# Patient Record
Sex: FEMALE | Race: WHITE | NOT HISPANIC OR LATINO | Employment: OTHER | URBAN - METROPOLITAN AREA
[De-identification: names, ages, dates, MRNs, and addresses within clinical notes are randomized per-mention and may not be internally consistent; named-entity substitution may affect disease eponyms.]

---

## 2017-03-30 ENCOUNTER — ALLSCRIPTS OFFICE VISIT (OUTPATIENT)
Dept: OTHER | Facility: OTHER | Age: 77
End: 2017-03-30

## 2017-03-30 DIAGNOSIS — I10 ESSENTIAL (PRIMARY) HYPERTENSION: ICD-10-CM

## 2017-03-30 DIAGNOSIS — I25.10 ATHEROSCLEROTIC HEART DISEASE OF NATIVE CORONARY ARTERY WITHOUT ANGINA PECTORIS: ICD-10-CM

## 2017-04-04 ENCOUNTER — GENERIC CONVERSION - ENCOUNTER (OUTPATIENT)
Dept: OTHER | Facility: OTHER | Age: 77
End: 2017-04-04

## 2017-04-04 ENCOUNTER — APPOINTMENT (OUTPATIENT)
Dept: LAB | Facility: CLINIC | Age: 77
End: 2017-04-04
Payer: MEDICARE

## 2017-04-04 DIAGNOSIS — I25.10 ATHEROSCLEROTIC HEART DISEASE OF NATIVE CORONARY ARTERY WITHOUT ANGINA PECTORIS: ICD-10-CM

## 2017-04-04 DIAGNOSIS — I10 ESSENTIAL (PRIMARY) HYPERTENSION: ICD-10-CM

## 2017-04-04 LAB
ALBUMIN SERPL BCP-MCNC: 3.8 G/DL (ref 3.5–5)
ALP SERPL-CCNC: 90 U/L (ref 46–116)
ALT SERPL W P-5'-P-CCNC: 28 U/L (ref 12–78)
ANION GAP SERPL CALCULATED.3IONS-SCNC: 8 MMOL/L (ref 4–13)
AST SERPL W P-5'-P-CCNC: 11 U/L (ref 5–45)
BILIRUB SERPL-MCNC: 0.65 MG/DL (ref 0.2–1)
BUN SERPL-MCNC: 19 MG/DL (ref 5–25)
CALCIUM SERPL-MCNC: 9.3 MG/DL (ref 8.3–10.1)
CHLORIDE SERPL-SCNC: 103 MMOL/L (ref 100–108)
CHOLEST SERPL-MCNC: 119 MG/DL (ref 50–200)
CO2 SERPL-SCNC: 28 MMOL/L (ref 21–32)
CREAT SERPL-MCNC: 0.79 MG/DL (ref 0.6–1.3)
GFR SERPL CREATININE-BSD FRML MDRD: >60 ML/MIN/1.73SQ M
GLUCOSE P FAST SERPL-MCNC: 96 MG/DL (ref 65–99)
HDLC SERPL-MCNC: 45 MG/DL (ref 40–60)
LDLC SERPL CALC-MCNC: 59 MG/DL (ref 0–100)
POTASSIUM SERPL-SCNC: 4.6 MMOL/L (ref 3.5–5.3)
PROT SERPL-MCNC: 7.3 G/DL (ref 6.4–8.2)
SODIUM SERPL-SCNC: 139 MMOL/L (ref 136–145)
TRIGL SERPL-MCNC: 77 MG/DL
TSH SERPL DL<=0.05 MIU/L-ACNC: 4.49 UIU/ML (ref 0.36–3.74)

## 2017-04-04 PROCEDURE — 80053 COMPREHEN METABOLIC PANEL: CPT

## 2017-04-04 PROCEDURE — 36415 COLL VENOUS BLD VENIPUNCTURE: CPT

## 2017-04-04 PROCEDURE — 84443 ASSAY THYROID STIM HORMONE: CPT

## 2017-04-04 PROCEDURE — 80061 LIPID PANEL: CPT

## 2017-04-20 ENCOUNTER — ALLSCRIPTS OFFICE VISIT (OUTPATIENT)
Dept: OTHER | Facility: OTHER | Age: 77
End: 2017-04-20

## 2017-06-13 ENCOUNTER — TRANSCRIBE ORDERS (OUTPATIENT)
Dept: LAB | Facility: CLINIC | Age: 77
End: 2017-06-13

## 2017-06-13 ENCOUNTER — APPOINTMENT (OUTPATIENT)
Dept: LAB | Facility: CLINIC | Age: 77
End: 2017-06-13
Payer: MEDICARE

## 2017-06-13 DIAGNOSIS — E03.9 HYPOTHYROIDISM, UNSPECIFIED TYPE: Primary | ICD-10-CM

## 2017-06-13 DIAGNOSIS — E03.9 HYPOTHYROIDISM, UNSPECIFIED TYPE: ICD-10-CM

## 2017-06-13 LAB — TSH SERPL DL<=0.05 MIU/L-ACNC: 1.59 UIU/ML (ref 0.36–3.74)

## 2017-06-13 PROCEDURE — 84443 ASSAY THYROID STIM HORMONE: CPT

## 2017-06-13 PROCEDURE — 36415 COLL VENOUS BLD VENIPUNCTURE: CPT

## 2017-10-02 ENCOUNTER — ALLSCRIPTS OFFICE VISIT (OUTPATIENT)
Dept: OTHER | Facility: OTHER | Age: 77
End: 2017-10-02

## 2017-10-02 DIAGNOSIS — E03.9 HYPOTHYROIDISM: ICD-10-CM

## 2017-10-03 ENCOUNTER — GENERIC CONVERSION - ENCOUNTER (OUTPATIENT)
Dept: OTHER | Facility: OTHER | Age: 77
End: 2017-10-03

## 2017-10-03 ENCOUNTER — APPOINTMENT (OUTPATIENT)
Dept: LAB | Facility: CLINIC | Age: 77
End: 2017-10-03
Payer: MEDICARE

## 2017-10-03 DIAGNOSIS — E03.9 HYPOTHYROIDISM: ICD-10-CM

## 2017-10-03 LAB
ALBUMIN SERPL BCP-MCNC: 3.8 G/DL (ref 3.5–5)
ALP SERPL-CCNC: 90 U/L (ref 46–116)
ALT SERPL W P-5'-P-CCNC: 26 U/L (ref 12–78)
ANION GAP SERPL CALCULATED.3IONS-SCNC: 7 MMOL/L (ref 4–13)
AST SERPL W P-5'-P-CCNC: 11 U/L (ref 5–45)
BASOPHILS # BLD AUTO: 0.01 THOUSANDS/ΜL (ref 0–0.1)
BASOPHILS NFR BLD AUTO: 0 % (ref 0–1)
BILIRUB SERPL-MCNC: 0.6 MG/DL (ref 0.2–1)
BUN SERPL-MCNC: 15 MG/DL (ref 5–25)
CALCIUM SERPL-MCNC: 9.1 MG/DL (ref 8.3–10.1)
CHLORIDE SERPL-SCNC: 100 MMOL/L (ref 100–108)
CHOLEST SERPL-MCNC: 116 MG/DL (ref 50–200)
CO2 SERPL-SCNC: 27 MMOL/L (ref 21–32)
CREAT SERPL-MCNC: 0.77 MG/DL (ref 0.6–1.3)
EOSINOPHIL # BLD AUTO: 0.1 THOUSAND/ΜL (ref 0–0.61)
EOSINOPHIL NFR BLD AUTO: 1 % (ref 0–6)
ERYTHROCYTE [DISTWIDTH] IN BLOOD BY AUTOMATED COUNT: 13.2 % (ref 11.6–15.1)
GFR SERPL CREATININE-BSD FRML MDRD: 75 ML/MIN/1.73SQ M
GLUCOSE P FAST SERPL-MCNC: 91 MG/DL (ref 65–99)
HCT VFR BLD AUTO: 43.8 % (ref 34.8–46.1)
HDLC SERPL-MCNC: 42 MG/DL (ref 40–60)
HGB BLD-MCNC: 14.6 G/DL (ref 11.5–15.4)
LDLC SERPL CALC-MCNC: 57 MG/DL (ref 0–100)
LYMPHOCYTES # BLD AUTO: 2.52 THOUSANDS/ΜL (ref 0.6–4.47)
LYMPHOCYTES NFR BLD AUTO: 30 % (ref 14–44)
MCH RBC QN AUTO: 29.7 PG (ref 26.8–34.3)
MCHC RBC AUTO-ENTMCNC: 33.3 G/DL (ref 31.4–37.4)
MCV RBC AUTO: 89 FL (ref 82–98)
MONOCYTES # BLD AUTO: 0.92 THOUSAND/ΜL (ref 0.17–1.22)
MONOCYTES NFR BLD AUTO: 11 % (ref 4–12)
NEUTROPHILS # BLD AUTO: 4.85 THOUSANDS/ΜL (ref 1.85–7.62)
NEUTS SEG NFR BLD AUTO: 58 % (ref 43–75)
NRBC BLD AUTO-RTO: 0 /100 WBCS
PLATELET # BLD AUTO: 248 THOUSANDS/UL (ref 149–390)
PMV BLD AUTO: 10.8 FL (ref 8.9–12.7)
POTASSIUM SERPL-SCNC: 4.1 MMOL/L (ref 3.5–5.3)
PROT SERPL-MCNC: 7.7 G/DL (ref 6.4–8.2)
RBC # BLD AUTO: 4.92 MILLION/UL (ref 3.81–5.12)
SODIUM SERPL-SCNC: 134 MMOL/L (ref 136–145)
TRIGL SERPL-MCNC: 83 MG/DL
TSH SERPL DL<=0.05 MIU/L-ACNC: 2.41 UIU/ML (ref 0.36–3.74)
WBC # BLD AUTO: 8.42 THOUSAND/UL (ref 4.31–10.16)

## 2017-10-03 PROCEDURE — 85025 COMPLETE CBC W/AUTO DIFF WBC: CPT

## 2017-10-03 PROCEDURE — 80053 COMPREHEN METABOLIC PANEL: CPT

## 2017-10-03 PROCEDURE — 80061 LIPID PANEL: CPT

## 2017-10-03 PROCEDURE — 36415 COLL VENOUS BLD VENIPUNCTURE: CPT

## 2017-10-03 PROCEDURE — 84443 ASSAY THYROID STIM HORMONE: CPT

## 2017-10-03 NOTE — PROGRESS NOTES
Assessment  1  Arteriosclerosis of coronary artery (414 00) (I25 10)   2  Hyperlipidemia (272 4) (E78 5)   3  Hypertension (401 9) (I10)   4  Hypothyroidism (244 9) (E03 9)    Plan  Hypothyroidism    · (1) CBC/PLT/DIFF; Status:Active; Requested FDO:[id number];    · (1) COMPREHENSIVE METABOLIC PANEL; Status:Active; Requested RYX:[id number];    · (1) LIPID PANEL, FASTING; Status:Active; Requested for:02Oct2017;    · (1) TSH; Status:Active; Requested UJD:[id number];     Discussion/Summary    Hypertension  Patient's blood pressure is stable at this time and she will continue her current regimen of medications  She will obtain blood work as ordered  Patient will check lipid panel blood work and continue her current statin therapy  Patient will check TSH blood work and continue her current dose of levothyroxine  artery disease  Patient denies any chest pain or shortness of breath  She scheduled follow-up with her cardiologist today  maintenance  Patient refuses any flu vaccines or pneumonia vaccines  Chief Complaint  Pt is here for a 6 month f/u appt  Pt offers no complaints  Pt would like to review how to take her medications  History of Present Illness  Patient denies any recent illness  She denies any chest pain or shortness of breath  She scheduled to see her cardiologist later today for routine follow-up visit  Review of Systems    Constitutional: No fever, no chills, feels well, no tiredness, no recent weight gain or weight loss  ENT: no complaints of earache, no loss of hearing, no nose bleeds, no nasal discharge, no sore throat, no hoarseness  Cardiovascular: No complaints of slow heart rate, no fast heart rate, no chest pain, no palpitations, no leg claudication, no lower extremity edema  Respiratory: No complaints of shortness of breath, no wheezing, no cough, no SOB on exertion, no orthopnea, no PND     Gastrointestinal: No complaints of abdominal pain, no constipation, no nausea or vomiting, no diarrhea, no bloody stools  Genitourinary: No complaints of dysuria, no incontinence, no pelvic pain, no dysmenorrhea, no vaginal discharge or bleeding  Musculoskeletal: No complaints of arthralgias, no myalgias, no joint swelling or stiffness, no limb pain or swelling  Active Problems  1  Arteriosclerosis of coronary artery (414 00) (I25 10)   2  Back pain (724 5) (M54 9)   3  Chest pain (786 50) (R07 9)   4  Eczema (692 9) (L30 9)   5  Esophageal reflux (530 81) (K21 9)   6  Generalized anxiety disorder (300 02) (F41 1)   7  Hyperlipidemia (272 4) (E78 5)   8  Hypertension (401 9) (I10)   9  Hypokalemia (276 8) (E87 6)   10  Hypothyroidism (244 9) (E03 9)   11  Otitis externa (380 10) (H60 90)   12  Pain of upper extremity, unspecified laterality (729 5) (M79 603)   13  Urinary tract infection (599 0) (N39 0)    Past Medical History  1  History of Cardiac Cath Procedures Performed   2  History of Coronary Artery Disease (V12 59)    Family History  Mother    1  Family history of Prior Myocardial Infarction  Father    2  Family history of Coronary Artery Disease (V17 49)   3  Family history of Hypertension (V17 49)  Sister    4  Family history of Cancer   5  Family history of Coronary Artery Disease (V17 49)   6  Family history of Diabetes Mellitus (V18 0)   7  Family history of Hyperlipidemia  Brother    8  Family history of Diabetes Mellitus (V18 0)    Social History   · Alcohol   · Daily Coffee Consumption (4  Cups/Day)   · Never A Smoker  The social history was reviewed and updated today  Current Meds   1  AmLODIPine Besylate 10 MG Oral Tablet; Take 1 tablet daily; Therapy: 60AXV6568 to (Last Rx:07Oct2016)  Requested for: 07Oct2016 Ordered   2  Aspirin 81 MG TABS; TAKE 1 TABLET DAILY; Therapy: (Recorded:68Yfb3295) to Recorded   3  Atorvastatin Calcium 40 MG Oral Tablet; TAKE 1 TABLET EVERY DAY AT BEDTIME;    Therapy: 59TNI7342 to (Evaluate:24Gfi5345)  Requested for: 21Feb2017; Last Rx: 36RQE1275 Ordered   4  Eye Vitamins Oral Capsule; TAKE AS DIRECTED; Therapy: 62HFC5237 to Recorded   5  Levothyroxine Sodium 25 MCG Oral Tablet; take 1 tablet by mouth once daily; Therapy: 36RGZ2799 to (Emma Sheth)  Requested for: 97OYJ1123; Last   Rx:21Wvm5204 Ordered   6  Losartan Potassium-HCTZ 100-25 MG Oral Tablet; take 1 tablet every day; Therapy: 02BLY6888 to (Evaluate:18Tpi8451)  Requested for: 67Bsc0744; Last   Rx:72Mds9247 Ordered   7  Metoprolol Tartrate 50 MG Oral Tablet; Take 1 tablet twice daily; Therapy: 38FBO8663 to (Evaluate:20Qvi1174)  Requested for: 01Nuz2734; Last   Rx:61Owk5089 Ordered   8  Nitrostat 0 4 MG Sublingual Tablet Sublingual; DISSOLVE 1 TABLET UNDER THE   TONGUE AS NEEDED FOR CHEST PAIN;   Therapy: (Recorded:85Ukc6668) to Recorded   9  Potassium Chloride Janet ER 20 MEQ Oral Tablet Extended Release; Take 1 tablet twice   daily; Therapy: 89WVG0713 to (Kary Jay)  Requested for: 81MVP4625; Last   Rx:83Bdu6283 Ordered    The medication list was reviewed and updated today  Allergies  1  No Known Drug Allergies    Vitals  Vital Signs    Recorded: 93BSE9532 07:43AM Recorded: 37QNZ2753 07:29AM   Temperature  96 8 F   Heart Rate  62   Systolic 309 301   Diastolic 80 80   Height  4 ft 11 in   Weight  128 lb    BMI Calculated  25 85   BSA Calculated  1 53     Physical Exam    Constitutional   General appearance: No acute distress, well appearing and well nourished  Pulmonary   Respiratory effort: No increased work of breathing or signs of respiratory distress  Auscultation of lungs: Clear to auscultation  Cardiovascular   Auscultation of heart: Normal rate and rhythm, normal S1 and S2, without murmurs  Examination of extremities for edema and/or varicosities: Normal     Carotid pulses: Normal     Abdomen   Abdomen: Non-tender, no masses  Liver and spleen: No hepatomegaly or splenomegaly      Musculoskeletal   Gait and station: Normal  Health Management  Health Maintenance   Medicare Annual Wellness Visit; every 1 year; Next Due: [id number]; Active    Future Appointments    Date/Time Provider Specialty Site   10/02/2017 08:20 HERB Leyva   Cardiology [facility]     Signatures   Electronically signed by : HERB Amin ; Oct  2 9023  7:53AM EST                       (Author)

## 2017-10-03 NOTE — PROGRESS NOTES
Assessment  Assessed   1  Arteriosclerosis of coronary artery (414 00) (I25 10)  2  Hyperlipidemia (272 4) (E78 5)    Plan  Arteriosclerosis of coronary artery    · Follow-up visit in 6 months Evaluation and Treatment  Follow-up  Status: Hold For -  Scheduling  Requested for: 50TPG6999  Ordered; For: Arteriosclerosis of coronary artery;  Ordered ByOtho Guardian    Performed:   Due: 90IOY3210   · A diet that is low in fat, cholesterol, and sodium is considered a cardiac diet ;  Status:Complete;   Done: 70WDF9244 08:49AM  Ordered; For:Arteriosclerosis of coronary artery; Ordered By:Dhiraj Jones;   · Begin or continue regular aerobic exercise  Gradually work up to at least {count1}  sessions of {dur1} of exercise a week ; Status:Complete;   Done: 27FDZ7338 08:49AM  Ordered; For:Arteriosclerosis of coronary artery; Ordered By:Dhiraj Jones; Discussion/Summary  Counseling Documentation With Imm: The patient was counseled regarding diagnostic results,-instructions for management,-risk factor reductions,-impressions,-importance of compliance with treatment  total time of encounter was 25 emkrsew-lzl-82 minutes was spent counseling  Discussion Summary:   1) CAD: s/p Xience stent Jan 2013, feels ok, compliant with medications  Heart healthy diet with increased fresh fruit and vegetables, lean proteins (chicken, fish, beans), whole grains (brown rice, whole wheat bread, whole wheat pasta), and reduce sugary desserts  Off Effient since it has been over 1 year of therapy since her PATRICK  Continue with baby ASA daily  HTN: BP better controlled and at goal, cough improved after lisinopril was stopped  Continue norvasc at 10mg daily along with losartan/HCTZ and metoprolol  Hyperlipidemia: LDL at goal, continue statin  Has a script from PMD to get checked now  Chief Complaint  Chief Complaint Free Text Note Form: 6 mth f/u      History of Present Illness  Hyperlipidemia (Follow-Up):  The patient states her hyperlipidemia has been under good control since the last visit  Comorbid Illnesses: coronary artery disease-and-hypertension  Interval Events: Feels well, no complaints  She has no significant interval events  Symptoms: denies chest pain,-denies intermittent leg claudication,-denies muscle pain-and-denies muscle weakness  Associated symptoms include no focal neurologic deficits-and-no memory loss  Medications: the patient is adherent with her medication regimen -She denies medication side effects  The patient is doing well with her hyperlipidemia goals  the patient's LDL goal is 70 mg/dL  -the patient's last LDL was 59 mg/dL  Hypertension (Follow-Up): The patient presents for follow-up of primary hypertension  The patient states she has been doing well with her blood pressure control since the last visit  Comorbid Illnesses: coronary artery disease  She has no significant interval events  Symptoms: denies impaired vision,-denies dyspnea,-denies chest pain,-denies intermittent leg claudication-and-denies lower extremity edema  Associated symptoms include no headache,-no focal neurologic deficits-and-no memory loss  Home monitoring: The patient is not checking blood pressure at home  Lifestyle: Diet: She consumes a diverse and healthy diet  Weight Issues: She does not have any weight concerns  Exercise: She exercises regularly  Smoking: She does not use tobacco Alcohol: She denies alcohol use  Drug Use: She denies drug use  Medications: the patient is adherent with her medication regimen -She denies medication side effects  Disease Management: the patient is doing well with her blood pressure goals  Coronary Artery Disease (Follow-Up): The patient states she has been doing well with her coronary artery disease symptoms since the last visit  Comorbid Illnesses: hypertension  She has no known CAD complications  Interval Events: Feels well, no complaints  She has no significant interval events  Symptoms: denies chest pain when at rest,-denies exertional chest pain,-denies dyspnea,-denies fatigue-and-denies exercise intolerance  Associated symptoms include no syncope,-no palpitations,-no PND,-no orthopnea-and-no edema  Her symptoms do not limit her activities  She denies nitroglycerin use since the last visit  Lifestyle: Diet: She consumes a diverse and healthy diet  Weight Issues: She does not have any weight concerns  Exercise: She exercises regularly  Smoking: She does not use tobacco Alcohol: She denies alcohol use  Drug Use: She denies drug use  Medications: the patient is adherent with her medication regimen -She denies medication side effects  Review of Systems  Cardiology Female ROS:     Cardiac: chest pain, but-as noted in HPI,-no rhythm problems,-no fainting/blackouts,-no heart murmur present,-no signs of swelling-and-no palpitations present  Skin: No complaints of nonhealing sores or skin rash  Genitourinary: No complaints of recurrent urinary tract infections, frequent urination at night, difficult urination, blood in urine, kidney stones, loss of bladder control, kidney problems, denies any birth control or hormone replacement, is not post menopausal, not currently pregnant  Psychological: No complaints of feeling depressed, anxiety, panic attacks, or difficulty concentrating  General: No complaints of trouble sleeping, lack of energy, fatigue, appetite changes, weight changes, fever, frequent infections, or night sweats  Respiratory: No complaints of shortness of breath, cough with sputum, or wheezing  HEENT: No complaints of serious problems, hearing problems, nose problems, throat problems, or snoring  Gastrointestinal: No complaints of liver problems, nausea, vomiting, heartburn, constipation, bloody stools, diarrhea, problems swallowing, adbominal pain, or rectal bleeding     Hematologic: No complaints of bleeding disorders, anemia, blood clots, or excessive brusing  Neurological: No complaints of numbness, tingling, dizziness, weakness, seizures, headaches, syncope or fainting, AM fatigue, daytime sleepiness, no witnessed apnea episodes  Musculoskeletal: No complaints of arthritis, back pain, or painfull swelling  ROS Reviewed:   ROS reviewed  Active Problems  Problems   1  Arteriosclerosis of coronary artery (414 00) (I25 10)  2  Back pain (724 5) (M54 9)  3  Chest pain (786 50) (R07 9)  4  Eczema (692 9) (L30 9)  5  Esophageal reflux (530 81) (K21 9)  6  Generalized anxiety disorder (300 02) (F41 1)  7  Hyperlipidemia (272 4) (E78 5)  8  Hypertension (401 9) (I10)  9  Hypokalemia (276 8) (E87 6)  10  Hypothyroidism (244 9) (E03 9)  11  Otitis externa (380 10) (H60 90)  12  Pain of upper extremity, unspecified laterality (729 5) (M79 603)  13  Urinary tract infection (599 0) (N39 0)    Past Medical History  Problems   1  History of Cardiac Cath Procedures Performed  2  History of Coronary Artery Disease (V12 59)  Active Problems And Past Medical History Reviewed: The active problems and past medical history were reviewed and updated today  Surgical History  Surgical History Reviewed: The surgical history was reviewed and updated today  Family History  Mother   1  Family history of Prior Myocardial Infarction  Father   2  Family history of Coronary Artery Disease (V17 49)  3  Family history of Hypertension (V17 49)  Sister   4  Family history of Cancer  5  Family history of Coronary Artery Disease (V17 49)  6  Family history of Diabetes Mellitus (V18 0)  7  Family history of Hyperlipidemia  Brother   8  Family history of Diabetes Mellitus (V18 0)  Family History Reviewed: The family history was reviewed and updated today  Social History  Problems    · Alcohol   · Daily Coffee Consumption (4  Cups/Day)   · Never A Smoker  Social History Reviewed: The social history was reviewed and updated today   The social history was reviewed and is unchanged  Current Meds  1  AmLODIPine Besylate 10 MG Oral Tablet; Take 1 tablet daily; Therapy: 02PQI5426 to (Last Rx:07Oct2016)  Requested for: 07Oct2016 Ordered  2  Aspirin 81 MG TABS; TAKE 1 TABLET DAILY; Therapy: (Recorded:15Bqn2288) to Recorded  3  Atorvastatin Calcium 40 MG Oral Tablet; TAKE 1 TABLET EVERY DAY AT BEDTIME; Therapy: 59OFK1284 to (Evaluate:89Vut2285)  Requested for: 82Kdi8517; Last   Rx:83Ywm5551 Ordered  4  Eye Vitamins Oral Capsule; TAKE AS DIRECTED; Therapy: 28CWK4860 to Recorded  5  Levothyroxine Sodium 25 MCG Oral Tablet; take 1 tablet by mouth once daily; Therapy: 52RME5225 to (Estrella Ott)  Requested for: 00LWN5291; Last   Rx:83Tjv8599 Ordered  6  Losartan Potassium-HCTZ 100-25 MG Oral Tablet; take 1 tablet every day; Therapy: 69TFY8577 to (Evaluate:15Mkp1790)  Requested for: 49Zpv3783; Last   Rx:21Vmx1057 Ordered  7  Metoprolol Tartrate 50 MG Oral Tablet; Take 1 tablet twice daily; Therapy: 87NUH7452 to (Evaluate:53Fud4057)  Requested for: 11Mgo2151; Last   Rx:67Gmx4184 Ordered  8  Nitrostat 0 4 MG Sublingual Tablet Sublingual; DISSOLVE 1 TABLET UNDER THE   TONGUE AS NEEDED FOR CHEST PAIN;   Therapy: (Recorded:17Gtp6741) to Recorded  9  Potassium Chloride Janet ER 20 MEQ Oral Tablet Extended Release; Take 1 tablet twice   daily; Therapy: 79BSH6874 to Ashanti Brown)  Requested for: 67Jbh9969; Last   Rx:49Hfg4968 Ordered  Medication List Reviewed: The medication list was reviewed and updated today  Allergies  Medication   1  No Known Drug Allergies    Vitals  Vital Signs    Recorded: 94UGT3358 08:28AM   Heart Rate 60   Systolic 448, RUE, Sitting   Diastolic 64, RUE, Sitting   Height 4 ft 11 in   Weight 128 lb    BMI Calculated 25 85   BSA Calculated 1 53     Physical Exam    Constitutional   General appearance: No acute distress, well appearing and well nourished  Eyes   Conjunctiva and Sclera examination: Conjunctiva pink, sclera anicteric  Ears, Nose, Mouth, and Throat - Oropharynx: Clear, nares are clear, mucous membranes are moist    Neck   Neck and thyroid: Normal, supple, trachea midline, no thyromegaly  Pulmonary   Respiratory effort: No increased work of breathing or signs of respiratory distress  Auscultation of lungs: Clear to auscultation  Cardiovascular   Palpation of heart: Normal PMI, no thrills  Auscultation of heart: Normal rate and rhythm, normal S1 and S2, no murmurs  Carotid pulses: Normal, 2+ bilaterally  Femoral pulses: Normal, 2+ bilaterally  Pedal pulses: Normal, 2+ bilaterally  Examination of extremities for edema and/or varicosities: Normal     Chest -   Sternum: Normal     Abdomen   Abdomen: Non-tender and no distention  Liver and spleen: No hepatomegaly or splenomegaly  Musculoskeletal Gait and station: Normal gait  -Digits and nails: Normal without clubbing or cyanosis -Inspection/palpation of joints, bones, and muscles: Normal, ROM normal     Skin - Skin and subcutaneous tissue: Normal without rashes or lesions  Skin is warm and well perfused, normal turgor  Neurologic - Cranial nerves: II - XII intact  -Reflexes: Normal -Sensation: No sensory loss  Psychiatric - Orientation to person, place, and time: Normal -Mood and affect: Normal       Results/Data  ECG Report:   Rhythm and rate:  normal sinus rhythm  P-waves:   the P wave is normal -WI interval is normal  Q-waves are present The findings are consistent with a myocardial infarction of the lateral wall-and-of the septal wall  ST segment: the ST segments are normal    T waves:   normal       Health Management  Health Maintenance   Medicare Annual Wellness Visit; every 1 year; Next Due: [id number]; Active    Future Appointments    Date/Time Provider Specialty Site   21/21/7973 87:42 AM HERB Oliver   Family Medicine [address] [place] [address]     Signatures   Electronically signed by : HERB Lima ; Oct  2 2017  8:52AM PRISCA                       (Author)    Electronically signed by : HERB Swift ; Oct  2 2017  8:53AM EST                       (Author)

## 2018-01-09 NOTE — CONSULTS
I had the pleasure of evaluating your patient, Allan Montano  My full evaluation follows:      Chief Complaint  6 mth f/u      History of Present Illness  The patient states her hyperlipidemia has been under good control since the last visit  Comorbid Illnesses: coronary artery disease and hypertension  Interval Events: Feels well, no complaints  She has no significant interval events  Symptoms: denies chest pain, denies intermittent leg claudication, denies muscle pain and denies muscle weakness  Associated symptoms include no focal neurologic deficits and no memory loss  Medications: the patient is adherent with her medication regimen  She denies medication side effects  The patient is doing well with her hyperlipidemia goals  the patient's LDL goal is 70 mg/dL  the patient's last LDL was 59 mg/dL  The patient presents for follow-up of primary hypertension  The patient states she has been doing well with her blood pressure control since the last visit  Comorbid Illnesses: coronary artery disease  She has no significant interval events  Symptoms: denies impaired vision, denies dyspnea, denies chest pain, denies intermittent leg claudication and denies lower extremity edema  Associated symptoms include no headache, no focal neurologic deficits and no memory loss  Home monitoring: The patient is not checking blood pressure at home  Lifestyle: Diet: She consumes a diverse and healthy diet  Weight Issues: She does not have any weight concerns  Exercise: She exercises regularly  Smoking: She does not use tobacco Alcohol: She denies alcohol use  Drug Use: She denies drug use  Medications: the patient is adherent with her medication regimen  She denies medication side effects  Disease Management: the patient is doing well with her blood pressure goals  The patient states she has been doing well with her coronary artery disease symptoms since the last visit  Comorbid Illnesses: hypertension   She has no known CAD complications  Interval Events: Feels well, no complaints  She has no significant interval events  Symptoms: denies chest pain when at rest, denies exertional chest pain, denies dyspnea, denies fatigue and denies exercise intolerance  Associated symptoms include no syncope, no palpitations, no PND, no orthopnea and no edema  Her symptoms do not limit her activities  She denies nitroglycerin use since the last visit  Lifestyle: Diet: She consumes a diverse and healthy diet  Weight Issues: She does not have any weight concerns  Exercise: She exercises regularly  Smoking: She does not use tobacco Alcohol: She denies alcohol use  Drug Use: She denies drug use  Medications: the patient is adherent with her medication regimen  She denies medication side effects  Review of Systems      Cardiac: chest pain, but as noted in HPI, no rhythm problems, no fainting/blackouts, no heart murmur present, no signs of swelling and no palpitations present  Skin: No complaints of nonhealing sores or skin rash  Genitourinary: No complaints of recurrent urinary tract infections, frequent urination at night, difficult urination, blood in urine, kidney stones, loss of bladder control, kidney problems, denies any birth control or hormone replacement, is not post menopausal, not currently pregnant  Psychological: No complaints of feeling depressed, anxiety, panic attacks, or difficulty concentrating  General: No complaints of trouble sleeping, lack of energy, fatigue, appetite changes, weight changes, fever, frequent infections, or night sweats  Respiratory: No complaints of shortness of breath, cough with sputum, or wheezing  HEENT: No complaints of serious problems, hearing problems, nose problems, throat problems, or snoring  Gastrointestinal: No complaints of liver problems, nausea, vomiting, heartburn, constipation, bloody stools, diarrhea, problems swallowing, adbominal pain, or rectal bleeding  Hematologic: No complaints of bleeding disorders, anemia, blood clots, or excessive brusing  Neurological: No complaints of numbness, tingling, dizziness, weakness, seizures, headaches, syncope or fainting, AM fatigue, daytime sleepiness, no witnessed apnea episodes  Musculoskeletal: No complaints of arthritis, back pain, or painfull swelling  ROS reviewed  Active Problems    1  Arteriosclerosis of coronary artery (414 00) (I25 10)   2  Back pain (724 5) (M54 9)   3  Chest pain (786 50) (R07 9)   4  Eczema (692 9) (L30 9)   5  Esophageal reflux (530 81) (K21 9)   6  Generalized anxiety disorder (300 02) (F41 1)   7  Hyperlipidemia (272 4) (E78 5)   8  Hypertension (401 9) (I10)   9  Hypokalemia (276 8) (E87 6)   10  Hypothyroidism (244 9) (E03 9)   11  Otitis externa (380 10) (H60 90)   12  Pain of upper extremity, unspecified laterality (729 5) (M79 603)   13  Urinary tract infection (599 0) (N39 0)    Past Medical History    · History of Cardiac Cath Procedures Performed   · History of Coronary Artery Disease (V12 59)    The active problems and past medical history were reviewed and updated today  Surgical History    The surgical history was reviewed and updated today  Family History    · Family history of Prior Myocardial Infarction    · Family history of Coronary Artery Disease (V17 49)   · Family history of Hypertension (V17 49)    · Family history of Cancer   · Family history of Coronary Artery Disease (V17 49)   · Family history of Diabetes Mellitus (V18 0)   · Family history of Hyperlipidemia    · Family history of Diabetes Mellitus (V18 0)    The family history was reviewed and updated today  Social History    · Alcohol   · Daily Coffee Consumption (4  Cups/Day)   · Never A Smoker  The social history was reviewed and updated today  The social history was reviewed and is unchanged  Current Meds   1  AmLODIPine Besylate 10 MG Oral Tablet; Take 1 tablet daily;    Therapy: 93HUK5038 to (Last Rx:07Oct2016)  Requested for: 07Oct2016 Ordered   2  Aspirin 81 MG TABS; TAKE 1 TABLET DAILY; Therapy: (Recorded:10Kkd7510) to Recorded   3  Atorvastatin Calcium 40 MG Oral Tablet; TAKE 1 TABLET EVERY DAY AT BEDTIME; Therapy: 53UHB9632 to (Evaluate:42Uqc7676)  Requested for: 01Elb2484; Last   Rx:91Yhk8682 Ordered   4  Eye Vitamins Oral Capsule; TAKE AS DIRECTED; Therapy: 30EEH1468 to Recorded   5  Levothyroxine Sodium 25 MCG Oral Tablet; take 1 tablet by mouth once daily; Therapy: 70FOX3056 to (Latrell Viramontes)  Requested for: 34EER8015; Last   Rx:23Lki6872 Ordered   6  Losartan Potassium-HCTZ 100-25 MG Oral Tablet; take 1 tablet every day; Therapy: 05FPV8432 to (Evaluate:33Uhi4943)  Requested for: 72Rcm0084; Last   Rx:63Zmy2037 Ordered   7  Metoprolol Tartrate 50 MG Oral Tablet; Take 1 tablet twice daily; Therapy: 28LHY5871 to (Evaluate:71Apd5162)  Requested for: 37Vom7277; Last   Rx:69Bly6281 Ordered   8  Nitrostat 0 4 MG Sublingual Tablet Sublingual; DISSOLVE 1 TABLET UNDER THE   TONGUE AS NEEDED FOR CHEST PAIN;   Therapy: (Recorded:81Eqf7855) to Recorded   9  Potassium Chloride Janet ER 20 MEQ Oral Tablet Extended Release; Take 1 tablet twice   daily; Therapy: 60QGF4627 to (467 35 767)  Requested for: 93SCL8515; Last   Rx:00Rxh4825 Ordered    The medication list was reviewed and updated today  Allergies    1  No Known Drug Allergies    Vitals   Recorded: 82MVM6571 08:28AM   Heart Rate 60   Systolic 610, RUE, Sitting   Diastolic 64, RUE, Sitting   Height 4 ft 11 in   Weight 128 lb    BMI Calculated 25 85   BSA Calculated 1 53     Physical Exam    Constitutional   General appearance: No acute distress, well appearing and well nourished  Eyes   Conjunctiva and Sclera examination: Conjunctiva pink, sclera anicteric      Ears, Nose, Mouth, and Throat - Oropharynx: Clear, nares are clear, mucous membranes are moist    Neck   Neck and thyroid: Normal, supple, trachea midline, no thyromegaly  Pulmonary   Respiratory effort: No increased work of breathing or signs of respiratory distress  Auscultation of lungs: Clear to auscultation  Cardiovascular   Palpation of heart: Normal PMI, no thrills  Auscultation of heart: Normal rate and rhythm, normal S1 and S2, no murmurs  Carotid pulses: Normal, 2+ bilaterally  Femoral pulses: Normal, 2+ bilaterally  Pedal pulses: Normal, 2+ bilaterally  Examination of extremities for edema and/or varicosities: Normal     Chest -   Sternum: Normal     Abdomen   Abdomen: Non-tender and no distention  Liver and spleen: No hepatomegaly or splenomegaly  Musculoskeletal Gait and station: Normal gait  Digits and nails: Normal without clubbing or cyanosis  Inspection/palpation of joints, bones, and muscles: Normal, ROM normal     Skin - Skin and subcutaneous tissue: Normal without rashes or lesions  Skin is warm and well perfused, normal turgor  Neurologic - Cranial nerves: II - XII intact  Reflexes: Normal  Sensation: No sensory loss  Psychiatric - Orientation to person, place, and time: Normal  Mood and affect: Normal       Results/Data    Rhythm and rate:  normal sinus rhythm  P-waves:   the P wave is normal  IL interval is normal  Q-waves are present The findings are consistent with a myocardial infarction of the lateral wall and of the septal wall  ST segment: the ST segments are normal    T waves:   normal       Assessment    1  Arteriosclerosis of coronary artery (414 00) (I25 10)   2  Hyperlipidemia (272 4) (E78 5)    Plan  Arteriosclerosis of coronary artery    · Follow-up visit in 6 months Evaluation and Treatment  Follow-up  Status: Hold For -  Scheduling  Requested for: 26FSR8391   Ordered;  For: Arteriosclerosis of coronary artery; Ordered By: Dellar Apley Performed:  Due: 23OJQ9741   · A diet that is low in fat, cholesterol, and sodium is considered a cardiac diet ;  Status:Complete;   Done: 73AJQ3973 08: 49AM   Ordered; For:Arteriosclerosis of coronary artery; Ordered By:Dhiraj Jones;   · Begin or continue regular aerobic exercise  Gradually work up to at least {count1}  sessions of {dur1} of exercise a week ; Status:Complete;   Done: 21LPX8590 08:49AM   Ordered; For:Arteriosclerosis of coronary artery; Ordered By:Dhiraj Jones; Discussion/Summary  The patient was counseled regarding diagnostic results, instructions for management, risk factor reductions, impressions, importance of compliance with treatment  total time of encounter was 25 minutes and 15 minutes was spent counseling  1) CAD: s/p Xience stent Jan 2013, feels ok, compliant with medications  Heart healthy diet with increased fresh fruit and vegetables, lean proteins (chicken, fish, beans), whole grains (brown rice, whole wheat bread, whole wheat pasta), and reduce sugary desserts  Off Effient since it has been over 1 year of therapy since her PATRICK  Continue with baby ASA daily  2) HTN: BP better controlled and at goal, cough improved after lisinopril was stopped  Continue norvasc at 10mg daily along with losartan/HCTZ and metoprolol  3) Hyperlipidemia: LDL at goal, continue statin  Has a script from PMD to get checked now  Thank you very much for allowing me to participate in the care of this patient  If you have any questions, please do not hesitate to contact me        Future Appointments    Signatures   Electronically signed by : HERB Morgan ; Oct  2 2017  8:52AM EST                       (Author)    Electronically signed by : HERB Morgan ; Oct  2 2017  8:53AM EST                       (Author)

## 2018-01-11 NOTE — RESULT NOTES
Discussion/Summary   all recent bw was normal        Verified Results  (1) CBC/PLT/DIFF 43SBU6508 97:21ZA Neela Haddad   TW Order Number: [id number]     Test Name Result Flag Reference   WBC COUNT 8 42 Thousand/uL  4 31-10 16   RBC COUNT 4 92 Million/uL  3 81-5 12   HEMOGLOBIN 14 6 g/dL  11 5-15 4   HEMATOCRIT 43 8 %  34 8-46  1   MCV 89 fL  82-98   MCH 29 7 pg  26 8-34 3   MCHC 33 3 g/dL  31 4-37 4   RDW 13 2 %  11 6-15 1   MPV 10 8 fL  8 9-12 7   PLATELET COUNT 430 Thousands/uL  149-390   nRBC AUTOMATED 0 /100 WBCs     NEUTROPHILS RELATIVE PERCENT 58 %  43-75   LYMPHOCYTES RELATIVE PERCENT 30 %  14-44   MONOCYTES RELATIVE PERCENT 11 %  4-12   EOSINOPHILS RELATIVE PERCENT 1 %  0-6   BASOPHILS RELATIVE PERCENT 0 %  0-1   NEUTROPHILS ABSOLUTE COUNT 4 85 Thousands/? ??L  1 85-7 62   LYMPHOCYTES ABSOLUTE COUNT 2 52 Thousands/? ??L  0 60-4 47   MONOCYTES ABSOLUTE COUNT 0 92 Thousand/? ??L  0 17-1 22   EOSINOPHILS ABSOLUTE COUNT 0 10 Thousand/? ??L  0 00-0 61   BASOPHILS ABSOLUTE COUNT 0 01 Thousands/? ??L  0 00-0 10     (1) COMPREHENSIVE METABOLIC PANEL 13PPK5457 28:67UN eNela Haddad    Order Number: [id number]     Test Name Result Flag Reference   SODIUM 134 mmol/L L 136-145   POTASSIUM 4 1 mmol/L  3 5-5 3   CHLORIDE 100 mmol/L  100-108   CARBON DIOXIDE 27 mmol/L  21-32   ANION GAP (CALC) 7 mmol/L  4-13   BLOOD UREA NITROGEN 15 mg/dL  5-25   CREATININE 0 77 mg/dL  0 60-1 30   Standardized to IDMS reference method   CALCIUM 9 1 mg/dL  8 3-10 1   BILI, TOTAL 0 60 mg/dL  0 20-1 00   ALK PHOSPHATAS 90 U/L     ALT (SGPT) 26 U/L  12-78   Specimen collection should occur prior to Sulfasalazine and/or Sulfapyridine administration due to the potential for falsely depressed results  AST(SGOT) 11 U/L  5-45   Specimen collection should occur prior to Sulfasalazine administration due to the potential for falsely depressed results     ALBUMIN 3 8 g/dL  3 5-5 0   TOTAL PROTEIN 7 7 g/dL  6 4-8 2   eGFR 75 ml/min/1 73sq [facility] Disease Education Program recommendations are as follows:  GFR calculation is accurate only with a steady state creatinine  Chronic Kidney disease less than 60 ml/min/1 73 sq  meters  Kidney failure less than 15 ml/min/1 73 sq  meters  GLUCOSE FASTING 91 mg/dL  65-99   Specimen collection should occur prior to Sulfasalazine administration due to the potential for falsely depressed results  Specimen collection should occur prior to Sulfapyridine administration due to the potential for falsely elevated results  (1) LIPID PANEL, FASTING 28FUD4779 84:02QO [employer] Order Number: [id number]     Test Name Result Flag Reference   CHOLESTEROL 116 mg/dL     HDL,DIRECT 42 mg/dL  40-60   Specimen collection should occur prior to Metamizole administration due to the potential for falsley depressed results  LDL CHOLESTEROL CALCULATED 57 mg/dL  0-100   Triglyceride:        Normal <150 mg/dl   Borderline High 150-199 mg/dl   High 200-499 mg/dl   Very High >499 mg/dl      Cholesterol:       Desirable <200 mg/dl    Borderline High 200-239 mg/dl    High >239 mg/dl      HDL Cholesterol:       High>59 mg/dL    Low <41 mg/dL      This screening LDL is a calculated result  It does not have the accuracy of the Direct Measured LDL in the monitoring of patients with hyperlipidemia and/or statin therapy  Direct Measure LDL (KLE985) must be ordered separately in these patients  TRIGLYCERIDES 83 mg/dL  <=150   Specimen collection should occur prior to N-Acetylcysteine or Metamizole administration due to the potential for falsely depressed results  (1) TSH 94BES4563 26:69HQ [employer] Order Number: [id number]     Test Name Result Flag Reference   TSH 2 410 uIU/mL  0 358-3 740   Patients undergoing fluorescein dye angiography may retain small amounts of fluorescein in the body for 48-72 hours post procedure   Samples containing fluorescein can produce falsely depressed TSH values  If the patient had this procedure,a specimen should be resubmitted post fluorescein clearance            The recommended reference ranges for TSH during pregnancy are as follows:  First trimester 0 1 to 2 5 uIU/mL  Second trimester  0 2 to 3 0 uIU/mL  Third trimester 0 3 to 3 0 uIU/m

## 2018-01-13 VITALS
RESPIRATION RATE: 16 BRPM | TEMPERATURE: 96.9 F | SYSTOLIC BLOOD PRESSURE: 142 MMHG | HEART RATE: 78 BPM | DIASTOLIC BLOOD PRESSURE: 88 MMHG | HEIGHT: 59 IN | BODY MASS INDEX: 26.11 KG/M2 | WEIGHT: 129.5 LBS

## 2018-01-14 VITALS
HEART RATE: 62 BPM | SYSTOLIC BLOOD PRESSURE: 140 MMHG | BODY MASS INDEX: 25.8 KG/M2 | WEIGHT: 128 LBS | HEIGHT: 59 IN | TEMPERATURE: 96.8 F | DIASTOLIC BLOOD PRESSURE: 80 MMHG

## 2018-01-14 VITALS
BODY MASS INDEX: 25.8 KG/M2 | HEIGHT: 59 IN | SYSTOLIC BLOOD PRESSURE: 132 MMHG | HEART RATE: 60 BPM | WEIGHT: 128 LBS | DIASTOLIC BLOOD PRESSURE: 64 MMHG

## 2018-01-14 NOTE — PROGRESS NOTES
Assessment  Assessed    1  Arteriosclerosis of coronary artery (414 00) (I25 10)   2  Hyperlipidemia (272 4) (E78 5)   3  Hypertension (401 9) (I10)    Plan  Arteriosclerosis of coronary artery    · Follow-up visit in 6 months Evaluation and Treatment  Follow-up  Status: Hold For -  Scheduling  Requested for: 20Apr2017   Ordered; For: Arteriosclerosis of coronary artery; Ordered Radha Toney Performed:  Due: 25Apr2017   · A diet that is low in fat, cholesterol, and sodium is considered a cardiac diet ;  Status:Complete;   Done: 20Apr2017 08:19AM   Ordered; For:Arteriosclerosis of coronary artery; Ordered By:Dhiraj Jones;   · Begin or continue regular aerobic exercise  Gradually work up to at least count1  sessions of dur1 of exercise a week ; Status:Complete;   Done: 20Apr2017 08:19AM   Ordered; For:Arteriosclerosis of coronary artery; Ordered By:Dhiraj Jonse; Discussion/Summary  Counseling Documentation With Imm: The patient was counseled regarding diagnostic results, instructions for management, risk factor reductions, impressions, importance of compliance with treatment  total time of encounter was 25 minutes and 15 minutes was spent counseling  Discussion Summary:   1) CAD: s/p Xience stent Jan 2013, feels ok, compliant with medications  Heart healthy diet with increased fresh fruit and vegetables, lean proteins (chicken, fish, beans), whole grains (brown rice, whole wheat bread, whole wheat pasta), and reduce sugary desserts  Off Effient since it has been over 1 year of therapy since her PATRICK  Continue with baby ASA daily  2) HTN: BP better controlled and at goal, cough improved after lisinopril was stopped  Continue norvasc at 10mg daily along with losartan/HCTZ and metoprolol  3) Hyperlipidemia: LDL at goal, continue statin  Recheck annually  Chief Complaint  Chief Complaint Free Text Note Form: Patient presents for a 6 month follow up  She has no complaints today  History of Present Illness  Hyperlipidemia (Follow-Up): The patient states her hyperlipidemia has been under good control since the last visit  Comorbid Illnesses: coronary artery disease and hypertension  Interval Events: Feels well, no complaints  She was started on medicine for thyroid dysfunction  She has no significant interval events  Symptoms: denies chest pain, denies intermittent leg claudication, denies muscle pain and denies muscle weakness  Associated symptoms include no focal neurologic deficits and no memory loss  Medications: the patient is adherent with her medication regimen  She denies medication side effects  The patient is doing well with her hyperlipidemia goals  the patient's LDL goal is 70 mg/dL  the patient's last LDL was 48 mg/dL  Oct 2016  Hypertension (Follow-Up): The patient presents for follow-up of primary hypertension  The patient states she has been doing well with her blood pressure control since the last visit  Comorbid Illnesses: coronary artery disease  She has no significant interval events  Symptoms: denies impaired vision, denies dyspnea, denies chest pain, denies intermittent leg claudication and denies lower extremity edema  Associated symptoms include no headache, no focal neurologic deficits and no memory loss  Home monitoring: The patient is not checking blood pressure at home  Lifestyle: Diet: She consumes a diverse and healthy diet  Weight Issues: She does not have any weight concerns  Exercise: She exercises regularly  Smoking: She does not use tobacco Alcohol: She denies alcohol use  Drug Use: She denies drug use  Medications: the patient is adherent with her medication regimen  She denies medication side effects  Disease Management: the patient is doing well with her blood pressure goals  Coronary Artery Disease (Follow-Up): The patient states she has been doing well with her coronary artery disease symptoms since the last visit   Comorbid Illnesses: hypertension  She has no known CAD complications  Interval Events: Feels well, no complaints  She has no significant interval events  Symptoms: denies chest pain when at rest, denies exertional chest pain, denies dyspnea, denies fatigue and denies exercise intolerance  Associated symptoms include no syncope, no palpitations, no PND, no orthopnea and no edema  Her symptoms do not limit her activities  She denies nitroglycerin use since the last visit  Lifestyle: Diet: She consumes a diverse and healthy diet  Weight Issues: She does not have any weight concerns  Exercise: She exercises regularly  Smoking: She does not use tobacco Alcohol: She denies alcohol use  Drug Use: She denies drug use  Medications: the patient is adherent with her medication regimen  She denies medication side effects  Review of Systems  Cardiology Female ROS:     Cardiac: chest pain, but as noted in HPI, no rhythm problems, no fainting/blackouts, no heart murmur present, no signs of swelling and no palpitations present  Skin: No complaints of nonhealing sores or skin rash  Genitourinary: No complaints of recurrent urinary tract infections, frequent urination at night, difficult urination, blood in urine, kidney stones, loss of bladder control, kidney problems, denies any birth control or hormone replacement, is not post menopausal, not currently pregnant  Psychological: No complaints of feeling depressed, anxiety, panic attacks, or difficulty concentrating  General: No complaints of trouble sleeping, lack of energy, fatigue, appetite changes, weight changes, fever, frequent infections, or night sweats  Respiratory: No complaints of shortness of breath, cough with sputum, or wheezing  HEENT: No complaints of serious problems, hearing problems, nose problems, throat problems, or snoring     Gastrointestinal: No complaints of liver problems, nausea, vomiting, heartburn, constipation, bloody stools, diarrhea, problems swallowing, adbominal pain, or rectal bleeding  Hematologic: No complaints of bleeding disorders, anemia, blood clots, or excessive brusing  Neurological: No complaints of numbness, tingling, dizziness, weakness, seizures, headaches, syncope or fainting, AM fatigue, daytime sleepiness, no witnessed apnea episodes  Musculoskeletal: No complaints of arthritis, back pain, or painfull swelling  ROS Reviewed:   ROS reviewed  Active Problems  Problems    1  Arteriosclerosis of coronary artery (414 00) (I25 10)   2  Back pain (724 5) (M54 9)   3  Chest pain (786 50) (R07 9)   4  Eczema (692 9) (L30 9)   5  Esophageal reflux (530 81) (K21 9)   6  Generalized anxiety disorder (300 02) (F41 1)   7  Hyperlipidemia (272 4) (E78 5)   8  Hypertension (401 9) (I10)   9  Hypokalemia (276 8) (E87 6)   10  Hypothyroidism (244 9) (E03 9)   11  Otitis externa (380 10) (H60 90)   12  Pain of upper extremity, unspecified laterality (729 5) (M79 603)   13  Urinary tract infection (599 0) (N39 0)    Past Medical History  Problems    1  History of Cardiac Cath Procedures Performed   2  History of Coronary Artery Disease (V12 59)  Active Problems And Past Medical History Reviewed: The active problems and past medical history were reviewed and updated today  Surgical History  Surgical History Reviewed: The surgical history was reviewed and updated today  Family History  Mother    1  Family history of Prior Myocardial Infarction  Father    2  Family history of Coronary Artery Disease (V17 49)   3  Family history of Hypertension (V17 49)  Sister    4  Family history of Cancer   5  Family history of Coronary Artery Disease (V17 49)   6  Family history of Diabetes Mellitus (V18 0)   7  Family history of Hyperlipidemia  Brother    8  Family history of Diabetes Mellitus (V18 0)  Family History Reviewed: The family history was reviewed and updated today         Social History  Problems    · Alcohol   · Daily Coffee Consumption (4  Cups/Day)   · Never A Smoker  Social History Reviewed: The social history was reviewed and updated today  The social history was reviewed and is unchanged  Current Meds   1  AmLODIPine Besylate 10 MG Oral Tablet; Take 1 tablet daily; Therapy: 81ROC6143 to (Last Rx:07Oct2016)  Requested for: 07Oct2016 Ordered   2  Aspirin 81 MG TABS; TAKE 1 TABLET DAILY; Therapy: (Recorded:67Lwp2643) to Recorded   3  Atorvastatin Calcium 40 MG Oral Tablet; TAKE 1 TABLET EVERY DAY AT BEDTIME; Therapy: 53TGN3981 to (Evaluate:56Inv0024)  Requested for: 78Zql4104; Last   Rx:47Ghe9472 Ordered   4  Eye Vitamins Oral Capsule; TAKE AS DIRECTED; Therapy: 46OIH5512 to Recorded   5  Levothyroxine Sodium 25 MCG Oral Tablet; Take 1 tablet daily; Therapy: 10ZXO6405 to (Laurena Hunger)  Requested for: 04Apr2017; Last   Rx:04Apr2017 Ordered   6  Losartan Potassium-HCTZ 100-25 MG Oral Tablet; take 1 tablet every day; Therapy: 84DFM6692 to (Evaluate:09Eeg2075)  Requested for: 68XUV1608; Last   Rx:03Oct2016 Ordered   7  Metoprolol Tartrate 50 MG Oral Tablet; Take 1 tablet twice daily; Therapy: 53KEH6375 to (Evaluate:02Oct2017)  Requested for: 07Oct2016; Last   Rx:07Oct2016 Ordered   8  Nitrostat 0 4 MG Sublingual Tablet Sublingual; DISSOLVE 1 TABLET UNDER THE   TONGUE AS NEEDED FOR CHEST PAIN;   Therapy: (Recorded:86Evk2465) to Recorded   9  Potassium Chloride Janet ER 20 MEQ Oral Tablet Extended Release; Take 1 tablet twice   daily; Therapy: 31POH3287 to [phone])  Requested for: 79FBD1927; Last   Rx:07Oct2016 Ordered  Medication List Reviewed: The medication list was reviewed and updated today  Allergies  Medication    1   No Known Drug Allergies    Vitals  Vital Signs    Recorded: 20Apr2017 08:04AM   Heart Rate 60, R Radial   Systolic 921, RUE, Sitting   Diastolic 82, RUE, Sitting   Height 4 ft 11 in   Weight 131 lb 4 oz   BMI Calculated 26 51   BSA Calculated 1 54     Physical Exam    Constitutional   General appearance: No acute distress, well appearing and well nourished  Eyes   Conjunctiva and Sclera examination: Conjunctiva pink, sclera anicteric  Ears, Nose, Mouth, and Throat - Oropharynx: Clear, nares are clear, mucous membranes are moist    Neck   Neck and thyroid: Normal, supple, trachea midline, no thyromegaly  Pulmonary   Respiratory effort: No increased work of breathing or signs of respiratory distress  Auscultation of lungs: Clear to auscultation  Cardiovascular   Palpation of heart: Normal PMI, no thrills  Auscultation of heart: Normal rate and rhythm, normal S1 and S2, no murmurs  Carotid pulses: Normal, 2+ bilaterally  Femoral pulses: Normal, 2+ bilaterally  Pedal pulses: Normal, 2+ bilaterally  Examination of extremities for edema and/or varicosities: Normal     Chest -   Sternum: Normal     Abdomen   Abdomen: Non-tender and no distention  Liver and spleen: No hepatomegaly or splenomegaly  Musculoskeletal Gait and station: Normal gait  Digits and nails: Normal without clubbing or cyanosis  Inspection/palpation of joints, bones, and muscles: Normal, ROM normal     Skin - Skin and subcutaneous tissue: Normal without rashes or lesions  Skin is warm and well perfused, normal turgor  Neurologic - Cranial nerves: II - XII intact  Reflexes: Normal  Sensation: No sensory loss  Psychiatric - Orientation to person, place, and time: Normal  Mood and affect: Normal       Results/Data  ECG Report:   Rhythm and rate:  normal sinus rhythm  P-waves:   the P wave is normal  WV interval is normal  Q-waves are present The findings are consistent with a myocardial infarction of the lateral wall and of the septal wall  ST segment: the ST segments are normal    T waves:   normal  (1) COMPREHENSIVE METABOLIC PANEL 44QVO5700 63:84FJ Genevieve Purdy Order Number: [id number]     Test Name Result Flag Reference   SODIUM 139 mmol/L  136-145 POTASSIUM 4 6 mmol/L  3 5-5 3   CHLORIDE 103 mmol/L  100-108   CARBON DIOXIDE 28 mmol/L  21-32   ANION GAP (CALC) 8 mmol/L  4-13   BLOOD UREA NITROGEN 19 mg/dL  5-25   CREATININE 0 79 mg/dL  0 60-1 30   Standardized to IDMS reference method   CALCIUM 9 3 mg/dL  8 3-10 1   BILI, TOTAL 0 65 mg/dL  0 20-1 00   ALK PHOSPHATAS 90 U/L     ALT (SGPT) 28 U/L  12-78   AST(SGOT) 11 U/L  5-45   ALBUMIN 3 8 g/dL  3 5-5 0   TOTAL PROTEIN 7 3 g/dL  6 4-8 2   eGFR Non-African American      >60 0 ml/min/1 73sq m   - Patient Instructions: This is a fasting blood test  Water,black tea or black  coffee only after 9:00pm the night before test Drink 2 glasses of water the morning of test - Patient Instructions: This bloodwork is non-fasting  Please drink two glasses of   water morning of bloodwork  National Kidney Disease Education Program recommendations are as follows:  GFR calculation is accurate only with a steady state creatinine  Chronic Kidney disease less than 60 ml/min/1 73 sq  meters  Kidney failure less than 15 ml/min/1 73 sq  meters  GLUCOSE FASTING 96 mg/dL  65-99     (1) LIPID PANEL, FASTING 71LJG0939 72:28NT Jaqui Benton    Order Number: [id number]     Test Name Result Flag Reference   CHOLESTEROL 119 mg/dL     HDL,DIRECT 45 mg/dL  40-60   Specimen collection should occur prior to Metamizole administration due to the potential for falsely depressed results  LDL CHOLESTEROL CALCULATED 59 mg/dL  0-100   - Patient Instructions: This is a fasting blood test  Water,black tea or black  coffee only after 9:00pm the night before test   Drink 2 glasses of water the morning of test     - Patient Instructions: This is a fasting blood test  Water,black tea or black  coffee only after 9:00pm the night before test Drink 2 glasses of water the morning of test - Patient Instructions: This bloodwork is non-fasting  Please drink two glasses of   water morning of bloodwork    Triglyceride:         Normal <150 mg/dl       Borderline High    150-199 mg/dl       High               200-499 mg/dl       Very High          >499 mg/dl  Cholesterol:         Desirable        <200 mg/dl      Borderline High  200-239 mg/dl      High             >239 mg/dl  HDL Cholesterol:        High    >59 mg/dL      Low     <41 mg/dL  LDL CALCULATED:    This screening LDL is a calculated result  It does not have the accuracy of the Direct Measured LDL in the monitoring of patients with hyperlipidemia and/or statin therapy  Direct Measure LDL (JOA571) must be ordered separately in these patients  TRIGLYCERIDES 77 mg/dL  <=150   Specimen collection should occur prior to N-Acetylcysteine or Metamizole administration due to the potential for falsely depressed results  Health Management  Health Maintenance   Medicare Annual Wellness Visit; every 1 year; Next Due: [id number]; Active    Future Appointments    Date/Time Provider Specialty Site   03/29/5735 54:59 AM HERB Bhakta   Family Medicine [address] [place] [address]     Signatures   Electronically signed by : Denton Lesches, M D ; Apr 20 2017  8:24AM EST                       (Author)

## 2018-01-17 NOTE — CONSULTS
I had the pleasure of evaluating your patient, Lorena Barry  My full evaluation follows:      Chief Complaint  Patient presents for a 6 month follow up  She has no complaints today  History of Present Illness  The patient states her hyperlipidemia has been under good control since the last visit  Comorbid Illnesses: coronary artery disease and hypertension  Interval Events: Feels well, no complaints  She was started on medicine for thyroid dysfunction  She has no significant interval events  Symptoms: denies chest pain, denies intermittent leg claudication, denies muscle pain and denies muscle weakness  Associated symptoms include no focal neurologic deficits and no memory loss  Medications: the patient is adherent with her medication regimen  She denies medication side effects  The patient is doing well with her hyperlipidemia goals  the patient's LDL goal is 70 mg/dL  the patient's last LDL was 48 mg/dL  Oct 2016  The patient presents for follow-up of primary hypertension  The patient states she has been doing well with her blood pressure control since the last visit  Comorbid Illnesses: coronary artery disease  She has no significant interval events  Symptoms: denies impaired vision, denies dyspnea, denies chest pain, denies intermittent leg claudication and denies lower extremity edema  Associated symptoms include no headache, no focal neurologic deficits and no memory loss  Home monitoring: The patient is not checking blood pressure at home  Lifestyle: Diet: She consumes a diverse and healthy diet  Weight Issues: She does not have any weight concerns  Exercise: She exercises regularly  Smoking: She does not use tobacco Alcohol: She denies alcohol use  Drug Use: She denies drug use  Medications: the patient is adherent with her medication regimen  She denies medication side effects  Disease Management: the patient is doing well with her blood pressure goals     The patient states she has been doing well with her coronary artery disease symptoms since the last visit  Comorbid Illnesses: hypertension  She has no known CAD complications  Interval Events: Feels well, no complaints  She has no significant interval events  Symptoms: denies chest pain when at rest, denies exertional chest pain, denies dyspnea, denies fatigue and denies exercise intolerance  Associated symptoms include no syncope, no palpitations, no PND, no orthopnea and no edema  Her symptoms do not limit her activities  She denies nitroglycerin use since the last visit  Lifestyle: Diet: She consumes a diverse and healthy diet  Weight Issues: She does not have any weight concerns  Exercise: She exercises regularly  Smoking: She does not use tobacco Alcohol: She denies alcohol use  Drug Use: She denies drug use  Medications: the patient is adherent with her medication regimen  She denies medication side effects  Review of Systems      Cardiac: chest pain, but as noted in HPI, no rhythm problems, no fainting/blackouts, no heart murmur present, no signs of swelling and no palpitations present  Skin: No complaints of nonhealing sores or skin rash  Genitourinary: No complaints of recurrent urinary tract infections, frequent urination at night, difficult urination, blood in urine, kidney stones, loss of bladder control, kidney problems, denies any birth control or hormone replacement, is not post menopausal, not currently pregnant  Psychological: No complaints of feeling depressed, anxiety, panic attacks, or difficulty concentrating  General: No complaints of trouble sleeping, lack of energy, fatigue, appetite changes, weight changes, fever, frequent infections, or night sweats  Respiratory: No complaints of shortness of breath, cough with sputum, or wheezing  HEENT: No complaints of serious problems, hearing problems, nose problems, throat problems, or snoring     Gastrointestinal: No complaints of liver problems, nausea, vomiting, heartburn, constipation, bloody stools, diarrhea, problems swallowing, adbominal pain, or rectal bleeding  Hematologic: No complaints of bleeding disorders, anemia, blood clots, or excessive brusing  Neurological: No complaints of numbness, tingling, dizziness, weakness, seizures, headaches, syncope or fainting, AM fatigue, daytime sleepiness, no witnessed apnea episodes  Musculoskeletal: No complaints of arthritis, back pain, or painfull swelling  ROS reviewed  Active Problems    1  Arteriosclerosis of coronary artery (414 00) (I25 10)   2  Back pain (724 5) (M54 9)   3  Chest pain (786 50) (R07 9)   4  Eczema (692 9) (L30 9)   5  Esophageal reflux (530 81) (K21 9)   6  Generalized anxiety disorder (300 02) (F41 1)   7  Hyperlipidemia (272 4) (E78 5)   8  Hypertension (401 9) (I10)   9  Hypokalemia (276 8) (E87 6)   10  Hypothyroidism (244 9) (E03 9)   11  Otitis externa (380 10) (H60 90)   12  Pain of upper extremity, unspecified laterality (729 5) (M79 603)   13  Urinary tract infection (599 0) (N39 0)    Past Medical History    · History of Cardiac Cath Procedures Performed   · History of Coronary Artery Disease (V12 59)    The active problems and past medical history were reviewed and updated today  Surgical History    The surgical history was reviewed and updated today  Family History    · Family history of Prior Myocardial Infarction    · Family history of Coronary Artery Disease (V17 49)   · Family history of Hypertension (V17 49)    · Family history of Cancer   · Family history of Coronary Artery Disease (V17 49)   · Family history of Diabetes Mellitus (V18 0)   · Family history of Hyperlipidemia    · Family history of Diabetes Mellitus (V18 0)    The family history was reviewed and updated today  Social History    · Alcohol   · Daily Coffee Consumption (4  Cups/Day)   · Never A Smoker  The social history was reviewed and updated today   The social history was reviewed and is unchanged  Current Meds   1  AmLODIPine Besylate 10 MG Oral Tablet; Take 1 tablet daily; Therapy: 74NXX1795 to (Last Rx:10Bxn6721)  Requested for: 07Oct2016 Ordered   2  Aspirin 81 MG TABS; TAKE 1 TABLET DAILY; Therapy: (Recorded:36Yjt3774) to Recorded   3  Atorvastatin Calcium 40 MG Oral Tablet; TAKE 1 TABLET EVERY DAY AT BEDTIME; Therapy: 26BUR8544 to (Evaluate:51Rdu2617)  Requested for: 80Qjz6425; Last   Rx:78Kyg4899 Ordered   4  Eye Vitamins Oral Capsule; TAKE AS DIRECTED; Therapy: 85YDA9825 to Recorded   5  Levothyroxine Sodium 25 MCG Oral Tablet; Take 1 tablet daily; Therapy: 55KRM5010 to (Mounika Starch)  Requested for: 04Apr2017; Last   Rx:04Apr2017 Ordered   6  Losartan Potassium-HCTZ 100-25 MG Oral Tablet; take 1 tablet every day; Therapy: 69GJF3074 to (Evaluate:04Tyu7614)  Requested for: 07YXE4752; Last   Rx:03Oct2016 Ordered   7  Metoprolol Tartrate 50 MG Oral Tablet; Take 1 tablet twice daily; Therapy: 96VNO7954 to (Evaluate:61Agw2498)  Requested for: 07Oct2016; Last   Rx:20Eng6259 Ordered   8  Nitrostat 0 4 MG Sublingual Tablet Sublingual; DISSOLVE 1 TABLET UNDER THE   TONGUE AS NEEDED FOR CHEST PAIN;   Therapy: (Recorded:51Bsv4984) to Recorded   9  Potassium Chloride Janet ER 20 MEQ Oral Tablet Extended Release; Take 1 tablet twice   daily; Therapy: 30MBE6547 to [id number])  Requested for: 27FWK7176; Last   Rx:07Oct2016 Ordered    The medication list was reviewed and updated today  Allergies    1  No Known Drug Allergies    Vitals   Recorded: 20Apr2017 08:04AM   Heart Rate 60, R Radial   Systolic 206, RUE, Sitting   Diastolic 82, RUE, Sitting   Height 4 ft 11 in   Weight 131 lb 4 oz   BMI Calculated 26 51   BSA Calculated 1 54     Physical Exam    Constitutional   General appearance: No acute distress, well appearing and well nourished  Eyes   Conjunctiva and Sclera examination: Conjunctiva pink, sclera anicteric      Ears, Nose, Mouth, and Throat - Oropharynx: Clear, nares are clear, mucous membranes are moist    Neck   Neck and thyroid: Normal, supple, trachea midline, no thyromegaly  Pulmonary   Respiratory effort: No increased work of breathing or signs of respiratory distress  Auscultation of lungs: Clear to auscultation  Cardiovascular   Palpation of heart: Normal PMI, no thrills  Auscultation of heart: Normal rate and rhythm, normal S1 and S2, no murmurs  Carotid pulses: Normal, 2+ bilaterally  Femoral pulses: Normal, 2+ bilaterally  Pedal pulses: Normal, 2+ bilaterally  Examination of extremities for edema and/or varicosities: Normal     Chest -   Sternum: Normal     Abdomen   Abdomen: Non-tender and no distention  Liver and spleen: No hepatomegaly or splenomegaly  Musculoskeletal Gait and station: Normal gait  Digits and nails: Normal without clubbing or cyanosis  Inspection/palpation of joints, bones, and muscles: Normal, ROM normal     Skin - Skin and subcutaneous tissue: Normal without rashes or lesions  Skin is warm and well perfused, normal turgor  Neurologic - Cranial nerves: II - XII intact  Reflexes: Normal  Sensation: No sensory loss  Psychiatric - Orientation to person, place, and time: Normal  Mood and affect: Normal       Results/Data    Rhythm and rate:  normal sinus rhythm  P-waves:   the P wave is normal  AR interval is normal  Q-waves are present The findings are consistent with a myocardial infarction of the lateral wall and of the septal wall  ST segment: the ST segments are normal    T waves:   normal  (1) COMPREHENSIVE METABOLIC PANEL 57IFH2693 97:64SI Edwinna Durand Order Number: [id number]     Test Name Result Flag Reference   SODIUM 139 mmol/L  136-145   POTASSIUM 4 6 mmol/L  3 5-5 3   CHLORIDE 103 mmol/L  100-108   CARBON DIOXIDE 28 mmol/L  21-32   ANION GAP (CALC) 8 mmol/L  4-13   BLOOD UREA NITROGEN 19 mg/dL  5-25   CREATININE 0 79 mg/dL  0 60-1 30   Standardized to IDMS reference method   CALCIUM 9 3 mg/dL  8 3-10 1   BILI, TOTAL 0 65 mg/dL  0 20-1 00   ALK PHOSPHATAS 90 U/L     ALT (SGPT) 28 U/L  12-78   AST(SGOT) 11 U/L  5-45   ALBUMIN 3 8 g/dL  3 5-5 0   TOTAL PROTEIN 7 3 g/dL  6 4-8 2   eGFR Non-African American      >60 0 ml/min/1 73sq m   - Patient Instructions: This is a fasting blood test  Water,black tea or black  coffee only after 9:00pm the night before test Drink 2 glasses of water the morning of test - Patient Instructions: This bloodwork is non-fasting  Please drink two glasses of   water morning of bloodwork  National Kidney Disease Education Program recommendations are as follows:  GFR calculation is accurate only with a steady state creatinine  Chronic Kidney disease less than 60 ml/min/1 73 sq  meters  Kidney failure less than 15 ml/min/1 73 sq  meters  GLUCOSE FASTING 96 mg/dL  65-99     (1) LIPID PANEL, FASTING 28BMU7380 41:55RL [facility] Order Number: [id number]     Test Name Result Flag Reference   CHOLESTEROL 119 mg/dL     HDL,DIRECT 45 mg/dL  40-60   Specimen collection should occur prior to Metamizole administration due to the potential for falsely depressed results  LDL CHOLESTEROL CALCULATED 59 mg/dL  0-100   - Patient Instructions: This is a fasting blood test  Water,black tea or black  coffee only after 9:00pm the night before test   Drink 2 glasses of water the morning of test     - Patient Instructions: This is a fasting blood test  Water,black tea or black  coffee only after 9:00pm the night before test Drink 2 glasses of water the morning of test - Patient Instructions: This bloodwork is non-fasting  Please drink two glasses of   water morning of bloodwork    Triglyceride:         Normal              <150 mg/dl       Borderline High    150-199 mg/dl       High               200-499 mg/dl       Very High          >499 mg/dl  Cholesterol:         Desirable        <200 mg/dl      Borderline High  200-239 mg/dl High             >239 mg/dl  HDL Cholesterol:        High    >59 mg/dL      Low     <41 mg/dL  LDL CALCULATED:    This screening LDL is a calculated result  It does not have the accuracy of the Direct Measured LDL in the monitoring of patients with hyperlipidemia and/or statin therapy  Direct Measure LDL (BZC515) must be ordered separately in these patients  TRIGLYCERIDES 77 mg/dL  <=150   Specimen collection should occur prior to N-Acetylcysteine or Metamizole administration due to the potential for falsely depressed results  Assessment    1  Arteriosclerosis of coronary artery (414 00) (I25 10)   2  Hyperlipidemia (272 4) (E78 5)   3  Hypertension (401 9) (I10)    Plan  Arteriosclerosis of coronary artery    · Follow-up visit in 6 months Evaluation and Treatment  Follow-up  Status: Hold For -  Scheduling  Requested for: 20Apr2017   Ordered; For: Arteriosclerosis of coronary artery; Ordered ByUli Johns Performed:  Due: 25Apr2017   · A diet that is low in fat, cholesterol, and sodium is considered a cardiac diet ;  Status:Complete;   Done: 20Apr2017 08:19AM   Ordered; For:Arteriosclerosis of coronary artery; Ordered By:Dhiraj Jones;   · Begin or continue regular aerobic exercise  Gradually work up to at least count1  sessions of dur1 of exercise a week ; Status:Complete;   Done: 20Apr2017 08:19AM   Ordered; For:Arteriosclerosis of coronary artery; Ordered By:Dhiraj Jones; Discussion/Summary  The patient was counseled regarding diagnostic results, instructions for management, risk factor reductions, impressions, importance of compliance with treatment  total time of encounter was 25 minutes and 15 minutes was spent counseling  1) CAD: s/p Xience stent Jan 2013, feels ok, compliant with medications   Heart healthy diet with increased fresh fruit and vegetables, lean proteins (chicken, fish, beans), whole grains (brown rice, whole wheat bread, whole wheat pasta), and reduce sugary desserts  Off Effient since it has been over 1 year of therapy since her PATRICK  Continue with baby ASA daily  2) HTN: BP better controlled and at goal, cough improved after lisinopril was stopped  Continue norvasc at 10mg daily along with losartan/HCTZ and metoprolol  3) Hyperlipidemia: LDL at goal, continue statin  Recheck annually  Thank you very much for allowing me to participate in the care of this patient  If you have any questions, please do not hesitate to contact me        Future Appointments    Signatures   Electronically signed by : Cari Lesches, M D ; Apr 20 2017  8:24AM EST                       (Author)

## 2018-01-17 NOTE — RESULT NOTES
Verified Results  (1) COMPREHENSIVE METABOLIC PANEL [id number] 17:03LD Jonathan Workman Order Number: [id number]     Test Name Result Flag Reference   SODIUM 139 mmol/L  136-145   POTASSIUM 4 6 mmol/L  3 5-5 3   CHLORIDE 103 mmol/L  100-108   CARBON DIOXIDE 28 mmol/L  21-32   ANION GAP (CALC) 8 mmol/L  4-13   BLOOD UREA NITROGEN 19 mg/dL  5-25   CREATININE 0 79 mg/dL  0 60-1 30   Standardized to IDMS reference method   CALCIUM 9 3 mg/dL  8 3-10 1   BILI, TOTAL 0 65 mg/dL  0 20-1 00   ALK PHOSPHATAS 90 U/L     ALT (SGPT) 28 U/L  12-78   AST(SGOT) 11 U/L  5-45   ALBUMIN 3 8 g/dL  3 5-5 0   TOTAL PROTEIN 7 3 g/dL  6 4-8 2   eGFR Non-African American      >60 0 ml/min/1 73sq m   - Patient Instructions: This is a fasting blood test  Water,black tea or black  coffee only after 9:00pm the night before test Drink 2 glasses of water the morning of test - Patient Instructions: This bloodwork is non-fasting  Please drink two glasses of   water morning of bloodwork  National Kidney Disease Education Program recommendations are as follows:  GFR calculation is accurate only with a steady state creatinine  Chronic Kidney disease less than 60 ml/min/1 73 sq  meters  Kidney failure less than 15 ml/min/1 73 sq  meters  GLUCOSE FASTING 96 mg/dL  65-99     (1) LIPID PANEL, FASTING 16AFJ0467 80:83PG Nickieshanelleeric Herbdenis    Order Number: [id number]     Test Name Result Flag Reference   CHOLESTEROL 119 mg/dL     HDL,DIRECT 45 mg/dL  40-60   Specimen collection should occur prior to Metamizole administration due to the potential for falsely depressed results  LDL CHOLESTEROL CALCULATED 59 mg/dL  0-100   - Patient Instructions: This is a fasting blood test  Water,black tea or black  coffee only after 9:00pm the night before test   Drink 2 glasses of water the morning of test     - Patient Instructions:  This is a fasting blood test  Water,black tea or black  coffee only after 9:00pm the night before test Drink 2 glasses of water the morning of test - Patient Instructions: This bloodwork is non-fasting  Please drink two glasses of   water morning of bloodwork  Triglyceride:         Normal              <150 mg/dl       Borderline High    150-199 mg/dl       High               200-499 mg/dl       Very High          >499 mg/dl  Cholesterol:         Desirable        <200 mg/dl      Borderline High  200-239 mg/dl      High             >239 mg/dl  HDL Cholesterol:        High    >59 mg/dL      Low     <41 mg/dL  LDL CALCULATED:    This screening LDL is a calculated result  It does not have the accuracy of the Direct Measured LDL in the monitoring of patients with hyperlipidemia and/or statin therapy  Direct Measure LDL (HPZ274) must be ordered separately in these patients  TRIGLYCERIDES 77 mg/dL  <=150   Specimen collection should occur prior to N-Acetylcysteine or Metamizole administration due to the potential for falsely depressed results  (1) TSH 56HUR3819 78:76CJ Belen Clarke    Order Number: [id number]     Test Name Result Flag Reference   TSH 4 490 uIU/mL H 0 358-3 740   - Patient Instructions: This bloodwork is non-fasting  Please drink two glasses of water morning of bloodwork  - Patient Instructions: This is a fasting blood test  Water,black tea or black  coffee only after 9:00pm the night before test Drink 2 glasses of water the morning of test - Patient Instructions: This bloodwork is non-fasting  Please drink two glasses of   water morning of bloodwork  Patients undergoing fluorescein dye angiography may retain small amounts of fluorescein in the body for 48-72 hours post procedure  Samples containing fluorescein can produce falsely depressed TSH values  If the patient had this procedure,a specimen should be resubmitted post fluorescein clearance            The recommended reference ranges for TSH during pregnancy are as follows:  First trimester 0 1 to 2 5 uIU/mL  Second trimester 0 2 to 3 0 uIU/mL  Third trimester 0 3 to 3 0 uIU/m       Discussion/Summary   bw shows she has low thyroid  I will send in for thyroid med once daily and recheck bw in 3 mos

## 2018-01-22 VITALS
HEART RATE: 60 BPM | WEIGHT: 131.25 LBS | BODY MASS INDEX: 26.46 KG/M2 | SYSTOLIC BLOOD PRESSURE: 146 MMHG | HEIGHT: 59 IN | DIASTOLIC BLOOD PRESSURE: 82 MMHG

## 2018-03-19 ENCOUNTER — OFFICE VISIT (OUTPATIENT)
Dept: CARDIOLOGY CLINIC | Facility: CLINIC | Age: 78
End: 2018-03-19
Payer: MEDICARE

## 2018-03-19 VITALS
SYSTOLIC BLOOD PRESSURE: 132 MMHG | OXYGEN SATURATION: 98 % | BODY MASS INDEX: 25.88 KG/M2 | WEIGHT: 128.4 LBS | HEART RATE: 71 BPM | HEIGHT: 59 IN | DIASTOLIC BLOOD PRESSURE: 76 MMHG

## 2018-03-19 DIAGNOSIS — I10 ESSENTIAL HYPERTENSION: ICD-10-CM

## 2018-03-19 DIAGNOSIS — E78.2 MIXED HYPERLIPIDEMIA: ICD-10-CM

## 2018-03-19 DIAGNOSIS — I25.10 ARTERIOSCLEROSIS OF CORONARY ARTERY: Primary | ICD-10-CM

## 2018-03-19 PROCEDURE — 99214 OFFICE O/P EST MOD 30 MIN: CPT | Performed by: INTERNAL MEDICINE

## 2018-03-19 RX ORDER — ASPIRIN 81 MG/1
81 TABLET ORAL DAILY
COMMUNITY

## 2018-03-19 RX ORDER — LEVOTHYROXINE SODIUM 0.03 MG/1
TABLET ORAL
Refills: 0 | COMMUNITY
Start: 2018-02-20 | End: 2018-03-21 | Stop reason: SDUPTHER

## 2018-03-19 RX ORDER — LOSARTAN POTASSIUM AND HYDROCHLOROTHIAZIDE 25; 100 MG/1; MG/1
1 TABLET ORAL DAILY
COMMUNITY
End: 2018-12-04 | Stop reason: SDUPTHER

## 2018-03-19 RX ORDER — ATORVASTATIN CALCIUM 40 MG/1
40 TABLET, FILM COATED ORAL DAILY
COMMUNITY
End: 2018-12-04 | Stop reason: SDUPTHER

## 2018-03-19 RX ORDER — AMLODIPINE BESYLATE 10 MG/1
10 TABLET ORAL DAILY
COMMUNITY
End: 2018-07-10 | Stop reason: SDUPTHER

## 2018-03-19 RX ORDER — POTASSIUM CHLORIDE 20 MEQ/1
20 TABLET, EXTENDED RELEASE ORAL 2 TIMES DAILY
COMMUNITY
End: 2018-07-10 | Stop reason: SDUPTHER

## 2018-03-19 RX ORDER — METOPROLOL TARTRATE 50 MG/1
50 TABLET, FILM COATED ORAL EVERY 12 HOURS SCHEDULED
COMMUNITY
End: 2018-09-19 | Stop reason: SDUPTHER

## 2018-03-19 NOTE — PROGRESS NOTES
Cardiology Follow Up    Ciara Stacy  1940  [id number]     HEART & VASCULAR [facility]  [address] [place] [address] [address]    1  Arteriosclerosis of coronary artery     2  Essential hypertension     3  Mixed hyperlipidemia         Interval History: Patient feels well, no complaints  [place], feels well with activity  Newly diagnosed with thyroid - started on medication for it  Patient Active Problem List   Diagnosis    Arteriosclerosis of coronary artery    Hyperlipidemia    Hypertension     Past Medical History:   Diagnosis Date    Coronary artery disease      Social History     Social History    Marital status: Single     Spouse name: N/A    Number of children: N/A    Years of education: N/A     Occupational History    Not on file       Social History Main Topics    Smoking status: Never Smoker    Smokeless tobacco: Never Used    Alcohol use Yes    Drug use: Unknown    Sexual activity: Not on file     Other Topics Concern    Not on file     Social History Narrative    Daily coffee consumption (4 cups/day)      Family History   Problem Relation Age of Onset    Heart attack Mother      prior MI,  at the age of 80   [facility] Coronary artery disease Father     Hypertension Father     Cancer Sister     Coronary artery disease Sister     Diabetes Sister     Hyperlipidemia Sister     Diabetes Brother      Past Surgical History:   Procedure Laterality Date    CARDIAC CATHETERIZATION      post cath with PCI to the proximal LAD with a xience stent 2013    CORONARY ANGIOPLASTY WITH STENT PLACEMENT         Current Outpatient Prescriptions:     amLODIPine (NORVASC) 10 mg tablet, Take 10 mg by mouth daily, Disp: , Rfl:     aspirin (ECOTRIN LOW STRENGTH) 81 mg EC tablet, Take 81 mg by mouth daily, Disp: , Rfl:     atorvastatin (LIPITOR) 40 mg tablet, Take 40 mg by mouth daily, Disp: , Rfl:   levothyroxine 25 mcg tablet, , Disp: , Rfl: 0    losartan-hydrochlorothiazide (HYZAAR) 100-25 MG per tablet, Take 1 tablet by mouth daily, Disp: , Rfl:     metoprolol tartrate (LOPRESSOR) 50 mg tablet, Take 25 mg by mouth every 12 (twelve) hours, Disp: , Rfl:     Multiple Vitamins-Minerals (PRESERVISION AREDS PO), Take by mouth daily, Disp: , Rfl:     potassium chloride (K-DUR,KLOR-CON) 20 mEq tablet, Take 20 mEq by mouth 2 (two) times a day, Disp: , Rfl:   No Known Allergies    Labs:  Appointment on 10/03/2017   Component Date Value    WBC 10/03/2017 8 42     RBC 10/03/2017 4 92     Hemoglobin 10/03/2017 14 6     Hematocrit 10/03/2017 43 8     MCV 10/03/2017 89     MCH 10/03/2017 29 7     MCHC 10/03/2017 33 3     RDW 10/03/2017 13 2     MPV 10/03/2017 10 8     Platelets 13/23/7698 248     nRBC 10/03/2017 0     Neutrophils Relative 10/03/2017 58     Lymphocytes Relative 10/03/2017 30     Monocytes Relative 10/03/2017 11     Eosinophils Relative 10/03/2017 1     Basophils Relative 10/03/2017 0     Neutrophils Absolute 10/03/2017 4 85     Lymphocytes Absolute 10/03/2017 2 52     Monocytes Absolute 10/03/2017 0 92     Eosinophils Absolute 10/03/2017 0 10     Basophils Absolute 10/03/2017 0 01     Sodium 10/03/2017 134*    Potassium 10/03/2017 4 1     Chloride 10/03/2017 100     CO2 10/03/2017 27     Anion Gap 10/03/2017 7     BUN 10/03/2017 15     Creatinine 10/03/2017 0 77     Glucose, Fasting 10/03/2017 91     Calcium 10/03/2017 9 1     AST 10/03/2017 11     ALT 10/03/2017 26     Alkaline Phosphatase 10/03/2017 90     Total Protein 10/03/2017 7 7     Albumin 10/03/2017 3 8     Total Bilirubin 10/03/2017 0 60     eGFR 10/03/2017 75     Cholesterol 10/03/2017 116     Triglycerides 10/03/2017 83     HDL, Direct 10/03/2017 42     LDL Calculated 10/03/2017 57     TSH 3RD GENERATON 10/03/2017 2 410      Lab Results   Component Value Date    CHOL 116 10/03/2017    CHOL 99 10/26/2015    TRIG 83 10/03/2017    TRIG 64 10/26/2015    HDL 42 10/03/2017    HDL 40 10/26/2015     Imaging: No results found  Review of Systems:  Review of Systems   Constitutional: Negative for activity change, appetite change, chills, diaphoresis, fatigue and unexpected weight change  HENT: Negative for hearing loss, nosebleeds and sore throat  Eyes: Negative for photophobia and visual disturbance  Respiratory: Negative for cough, chest tightness, shortness of breath and wheezing  Cardiovascular: Negative for chest pain, palpitations and leg swelling  Gastrointestinal: Negative for abdominal pain, diarrhea, nausea and vomiting  Endocrine: Negative for polyuria  Genitourinary: Negative for dysuria, frequency and hematuria  Musculoskeletal: Negative for arthralgias, back pain, gait problem and neck pain  Skin: Negative for pallor and rash  Neurological: Negative for dizziness, syncope and headaches  Hematological: Does not bruise/bleed easily  Psychiatric/Behavioral: Negative for behavioral problems and confusion  Physical Exam:  Physical Exam   Constitutional: She is oriented to person, place, and time  She appears well-developed and well-nourished  HENT:   Head: Normocephalic and atraumatic  Nose: Nose normal    Eyes: EOM are normal  Pupils are equal, round, and reactive to light  No scleral icterus  Neck: Normal range of motion  Neck supple  No JVD present  Cardiovascular: Normal rate, regular rhythm and normal heart sounds  Exam reveals no gallop and no friction rub  No murmur heard  Pulmonary/Chest: Effort normal and breath sounds normal  No respiratory distress  She has no wheezes  She has no rales  Abdominal: Soft  Bowel sounds are normal  She exhibits no distension  There is no tenderness  Musculoskeletal: Normal range of motion  She exhibits no edema or deformity  Neurological: She is alert and oriented to person, place, and time   No cranial nerve deficit  Skin: Skin is warm and dry  No rash noted  She is not diaphoretic  Psychiatric: She has a normal mood and affect  Her behavior is normal    Vitals reviewed  Discussion/Summary:  1) CAD: s/p Eddie stent Jan 2013, feels ok, compliant with medications  Heart healthy diet with increased fresh fruit and vegetables, lean proteins (chicken, fish, beans), whole grains (brown rice, whole wheat bread, whole wheat pasta), and reduce sugary desserts  Off Effient since it has been over 1 year of therapy since her PATRICK  Continue with baby ASA daily     2) HTN: BP better controlled and at goal, cough improved after lisinopril was stopped  Continue norvasc at 10mg daily along with losartan/HCTZ and metoprolol     3) Hyperlipidemia: LDL at goal, continue statin

## 2018-03-19 NOTE — PATIENT INSTRUCTIONS
Coronary Artery Disease   WHAT YOU NEED TO KNOW:   What is coronary artery disease? Coronary artery disease (CAD) is narrowing of the arteries to your heart caused by a buildup of plaque  Plaque is made up of cholesterol and other substances  The narrowing in your arteries decreases the amount of blood that can flow to your heart  This causes your heart to get less oxygen  What increases my risk for CAD? · Age 36 years or older     · A family history of CAD     · Smoking or regular exposure to secondhand smoke     · A medical condition, such as high blood pressure, high cholesterol, or diabetes     · Obesity or lack of exercise  What are the signs and symptoms of CAD? You may not have any symptoms of CAD  The most common symptom is chest pain, also called angina  Angina may feel like burning, squeezing, or crushing tightness in your chest  The pain may spread to your neck, jaw, or shoulder blade  You may have other symptoms along with chest pain  These include nausea, vomiting, sweating, fainting, and hands and feet that are cold to the touch  How is CAD diagnosed? Your healthcare provider will ask you if you have a family history of CAD  He will also ask about your symptoms and about the medicines you are taking  You may need any of the following:  · Blood tests  will check for high cholesterol or other medical conditions that may have led to CAD  · An EKG  records the electrical activity of your heart  It is used to check your heart rhythm, and it may show if there is damage to your heart  · An echocardiogram  is a type of ultrasound  Sound waves are used to show the structure, movement, and blood vessels of your heart  · An exercise stress test  helps healthcare providers see the changes that take place in your heart during exercise  An EKG is done while you ride an exercise bike or walk on a treadmill   Healthcare providers will ask if you have chest pain or trouble breathing during the test  An exercise test may be combined with an echocardiogram or nuclear isotope imaging  Nuclear isotopes are very small amounts of radioactive material that are injected into your bloodstream  Images show areas of your heart that have decreased blood flow  · A stress test with medicine  may be done if you cannot do the exercise stress test  You are given medicine that causes your heart to work harder  You will be connected to a stress test machine  This test is combined with the echocardiogram or nuclear isotope imaging  · An angiography, CT scan, or MRI  may be done to take pictures of your blood vessels and arteries  The pictures may show how narrow or blocked your blood vessels are  You may be given contrast liquid to help healthcare providers see the pictures better  Tell the healthcare provider if you have ever had an allergic reaction to contrast liquid  Which medicines are used to treat CAD? · Blood pressure medicines  are given to lower your blood pressure  These medicines may include ACE inhibitors and beta-blockers  ACE inhibitors help keep your blood vessels relaxed and open  This helps keep blood flowing into your heart  Beta-blockers keep your heart pumping strongly and regularly  This helps keep your heart from working too hard to get oxygen  · Cholesterol medicines  help lower blood cholesterol levels  · Nitrates , such as nitroglycerin, relax the arteries of your heart so it gets more oxygen  They help to relieve your chest pain  · Antiplatelets , such as aspirin, help prevent blood clots  Take your antiplatelet medicine exactly as directed  These medicines make it more likely for you to bleed or bruise  If you are told to take aspirin, do not take acetaminophen or ibuprofen instead  · Blood thinners  keep clots from forming in your blood  Clots may cause heart attacks, strokes, or death  This medicine makes it more likely for you to bleed or bruise       · Do not take certain medicines without asking your healthcare provider first   These include NSAIDs, herbal or vitamin supplements, or hormones (estrogen or progestin)  Which procedures are used to treat CAD? · An angioplasty  may be done to open an artery blocked by plaque  A tube with a balloon on the end is threaded into the blocked artery  Once the tube is in the artery, the balloon is inflated  As the balloon inflates, it presses the plaque against the artery wall to open the artery  A stent may be placed in your artery to keep it open  · Coronary artery bypass surgery (CABG)  is open heart surgery  Healthcare providers take arteries or veins from other areas in your body and use them to bypass or go around the blocked arteries of your heart  What is cardiac rehabilitation? Your healthcare provider may recommend that you attend cardiac rehabilitation (rehab)  This is a program run by specialists who will help you safely strengthen your heart and reduce the risk for more heart disease  The plan includes exercise, relaxation, stress management, and heart-healthy nutrition  Healthcare providers will also check to make sure any medicines you are taking are working  What can I do to manage CAD? · Do not smoke  Nicotine and other chemicals in cigarettes and cigars can cause heart and lung damage  Ask your healthcare provider for information if you currently smoke and need help to quit  E-cigarettes or smokeless tobacco still contain nicotine  Talk to your healthcare provider before you use these products  · Exercise regularly  Exercise at least 30 minutes each day, on most days of the week  Exercise helps to lower high cholesterol and high blood pressure  It can also help you maintain a healthy weight  Ask your healthcare provider about the kind of exercise you should do and how to get started  · Maintain a healthy weight  If you are overweight, talk to your healthcare provider about how to lose weight   A weight loss of 10% can improve your heart health  · Eat heart-healthy foods  Include fresh fruits and vegetables in your meal plan  Choose low-fat foods, such as skim or 1% fat milk, low-fat cheese and yogurt, fish, chicken (without skin), and lean meats  Eat two 4-ounce servings of fish high in omega-3 fats each week, such as salmon, fresh tuna, and herring  Do not eat foods that are high in sodium, such as canned foods, potato chips, salty snacks, and cold cuts  Put less table salt on your food  · Limit or do not drink alcohol  A drink of alcohol is 12 ounces of beer, 5 ounces of wine, or 1½ ounces of liquor  · Manage other health conditions  Follow your healthcare provider's advice on how to manage other conditions that can affect your heart health  These include diabetes, high blood pressure, and high cholesterol  You may need to take medicines for these conditions and make other lifestyle changes  · Ask if you should have a flu vaccine  The flu can be dangerous for a person who has CAD  The flu vaccine is available every year in the fall  Call 911 for any of the following:   · You have any of the following signs of a heart attack:      ¨ Squeezing, pressure, or pain in your chest that lasts longer than 5 minutes or returns    ¨ Discomfort or pain in your back, neck, jaw, stomach, or arm     ¨ Trouble breathing    ¨ Nausea or vomiting    ¨ Lightheadedness or a sudden cold sweat, especially with chest pain or trouble breathing    When should I contact my healthcare provider? · You have chest pain that is more frequent, or you have chest pain at rest      · You have questions or concerns about your condition or care  CARE AGREEMENT:   You have the right to help plan your care  Learn about your health condition and how it may be treated  Discuss treatment options with your caregivers to decide what care you want to receive  You always have the right to refuse treatment   The above information is an  only  It is not intended as medical advice for individual conditions or treatments  Talk to your doctor, nurse or pharmacist before following any medical regimen to see if it is safe and effective for you  © 2017 [facility] Information is for End User's use only and may not be sold, redistributed or otherwise used for commercial purposes  All illustrations and images included in CareNotes® are the copyrighted property of A D A M , Inc  or [employer]et [employer]

## 2018-03-21 DIAGNOSIS — E03.9 HYPOTHYROIDISM, UNSPECIFIED TYPE: Primary | ICD-10-CM

## 2018-03-22 RX ORDER — LEVOTHYROXINE SODIUM 0.03 MG/1
TABLET ORAL
Qty: 30 TABLET | Refills: 5 | Status: SHIPPED | OUTPATIENT
Start: 2018-03-22 | End: 2018-07-13 | Stop reason: SDUPTHER

## 2018-04-03 ENCOUNTER — APPOINTMENT (OUTPATIENT)
Dept: LAB | Facility: CLINIC | Age: 78
End: 2018-04-03
Payer: MEDICARE

## 2018-04-03 ENCOUNTER — OFFICE VISIT (OUTPATIENT)
Dept: FAMILY MEDICINE CLINIC | Facility: CLINIC | Age: 78
End: 2018-04-03
Payer: MEDICARE

## 2018-04-03 VITALS
DIASTOLIC BLOOD PRESSURE: 82 MMHG | RESPIRATION RATE: 16 BRPM | HEIGHT: 59 IN | HEART RATE: 60 BPM | WEIGHT: 128.4 LBS | TEMPERATURE: 96.2 F | BODY MASS INDEX: 25.88 KG/M2 | SYSTOLIC BLOOD PRESSURE: 120 MMHG

## 2018-04-03 DIAGNOSIS — I10 ESSENTIAL HYPERTENSION: ICD-10-CM

## 2018-04-03 DIAGNOSIS — E03.9 HYPOTHYROIDISM, UNSPECIFIED TYPE: Primary | ICD-10-CM

## 2018-04-03 DIAGNOSIS — I25.10 ARTERIOSCLEROSIS OF CORONARY ARTERY: ICD-10-CM

## 2018-04-03 DIAGNOSIS — Z00.00 WELL ADULT EXAM: Primary | ICD-10-CM

## 2018-04-03 DIAGNOSIS — E03.9 HYPOTHYROIDISM, UNSPECIFIED TYPE: ICD-10-CM

## 2018-04-03 DIAGNOSIS — E78.5 HYPERLIPIDEMIA, UNSPECIFIED HYPERLIPIDEMIA TYPE: ICD-10-CM

## 2018-04-03 LAB
ALBUMIN SERPL BCP-MCNC: 3.8 G/DL (ref 3.5–5)
ALP SERPL-CCNC: 94 U/L (ref 46–116)
ALT SERPL W P-5'-P-CCNC: 18 U/L (ref 12–78)
ANION GAP SERPL CALCULATED.3IONS-SCNC: 6 MMOL/L (ref 4–13)
AST SERPL W P-5'-P-CCNC: 13 U/L (ref 5–45)
BILIRUB SERPL-MCNC: 0.63 MG/DL (ref 0.2–1)
BUN SERPL-MCNC: 14 MG/DL (ref 5–25)
CALCIUM SERPL-MCNC: 9.2 MG/DL (ref 8.3–10.1)
CHLORIDE SERPL-SCNC: 105 MMOL/L (ref 100–108)
CHOLEST SERPL-MCNC: 109 MG/DL (ref 50–200)
CO2 SERPL-SCNC: 28 MMOL/L (ref 21–32)
CREAT SERPL-MCNC: 0.74 MG/DL (ref 0.6–1.3)
ERYTHROCYTE [DISTWIDTH] IN BLOOD BY AUTOMATED COUNT: 13.2 % (ref 11.6–15.1)
GFR SERPL CREATININE-BSD FRML MDRD: 78 ML/MIN/1.73SQ M
GLUCOSE P FAST SERPL-MCNC: 94 MG/DL (ref 65–99)
HCT VFR BLD AUTO: 43.9 % (ref 34.8–46.1)
HDLC SERPL-MCNC: 45 MG/DL (ref 40–60)
HGB BLD-MCNC: 14 G/DL (ref 11.5–15.4)
LDLC SERPL CALC-MCNC: 48 MG/DL (ref 0–100)
MCH RBC QN AUTO: 29 PG (ref 26.8–34.3)
MCHC RBC AUTO-ENTMCNC: 31.9 G/DL (ref 31.4–37.4)
MCV RBC AUTO: 91 FL (ref 82–98)
PLATELET # BLD AUTO: 256 THOUSANDS/UL (ref 149–390)
PMV BLD AUTO: 10.6 FL (ref 8.9–12.7)
POTASSIUM SERPL-SCNC: 4.5 MMOL/L (ref 3.5–5.3)
PROT SERPL-MCNC: 7.5 G/DL (ref 6.4–8.2)
RBC # BLD AUTO: 4.82 MILLION/UL (ref 3.81–5.12)
SODIUM SERPL-SCNC: 139 MMOL/L (ref 136–145)
TRIGL SERPL-MCNC: 78 MG/DL
TSH SERPL DL<=0.05 MIU/L-ACNC: 1.83 UIU/ML (ref 0.36–3.74)
WBC # BLD AUTO: 7.35 THOUSAND/UL (ref 4.31–10.16)

## 2018-04-03 PROCEDURE — 80053 COMPREHEN METABOLIC PANEL: CPT

## 2018-04-03 PROCEDURE — 80061 LIPID PANEL: CPT

## 2018-04-03 PROCEDURE — 99214 OFFICE O/P EST MOD 30 MIN: CPT | Performed by: FAMILY MEDICINE

## 2018-04-03 PROCEDURE — G0439 PPPS, SUBSEQ VISIT: HCPCS | Performed by: FAMILY MEDICINE

## 2018-04-03 PROCEDURE — 36415 COLL VENOUS BLD VENIPUNCTURE: CPT

## 2018-04-03 PROCEDURE — 84443 ASSAY THYROID STIM HORMONE: CPT

## 2018-04-03 PROCEDURE — 85027 COMPLETE CBC AUTOMATED: CPT

## 2018-04-03 NOTE — PROGRESS NOTES
FAMILY PRACTICE OFFICE VISIT       NAME: Bud Boateng  AGE: 68 y o  SEX: female       : 1940        MRN: [id number]    DATE: 4/3/2018  TIME: 10:24 AM    Assessment and Plan     Problem List Items Addressed This Visit     Arteriosclerosis of coronary artery     Coronary artery disease  Patient is asymptomatic at this time  She is up-to-date with her cardiology office visit  Hyperlipidemia     Hyperlipidemia  Patient will have lipid panel blood work and continue with current dose of statin therapy         Hypertension     Hypertension  Patient blood pressure is stable at this time she will continue with current regimen of medications  She will obtain blood work as ordered         Relevant Orders    CBC    Comprehensive metabolic panel    Lipid panel    Hypothyroidism - Primary     Hypothyroidism  Patient will check TSH blood work and continue with current dose of levothyroxine         Relevant Orders    TSH, 3rd generation            There are no Patient Instructions on file for this visit  Chief Complaint     Chief Complaint   Patient presents with    Medicare Wellness Visit     AWV    Follow-up     HTN, HLD       History of Present Illness     Patient denies any recent illness  She denies any chest pain or shortness of breath  She recently saw her cardiologist but did not go require any testing this year  Review of Systems   Review of Systems   Constitutional: Negative  HENT: Negative  Eyes: Negative  Respiratory: Negative  Cardiovascular: Negative  Gastrointestinal: Negative  Genitourinary: Negative  Musculoskeletal: Negative  Skin: Negative  Neurological: Negative  Psychiatric/Behavioral: Negative          Active Problem List     Patient Active Problem List   Diagnosis    Arteriosclerosis of coronary artery    Hyperlipidemia    Hypertension    Esophageal reflux    Generalized anxiety disorder    Hypothyroidism       Past Medical History:  Past Medical History:   Diagnosis Date    Coronary artery disease        Past Surgical History:  Past Surgical History:   Procedure Laterality Date    CARDIAC CATHETERIZATION      post cath with PCI to the proximal LAD with a xience stent 2013    CORONARY ANGIOPLASTY WITH STENT PLACEMENT         Family History:  Family History   Problem Relation Age of Onset    Heart attack Mother      prior MI,  at the age of 80   [facility] Coronary artery disease Father     Hypertension Father     Cancer Sister     Coronary artery disease Sister     Diabetes Sister     Hyperlipidemia Sister     Diabetes Brother        Social History:  Social History     Social History    Marital status: Single     Spouse name: N/A    Number of children: N/A    Years of education: N/A     Occupational History    Not on file  Social History Main Topics    Smoking status: Never Smoker    Smokeless tobacco: Never Used    Alcohol use Yes    Drug use: Unknown    Sexual activity: Not on file     Other Topics Concern    Not on file     Social History Narrative    Daily coffee consumption (4 cups/day)       Objective     Vitals:    18 0757   BP: 120/82   Pulse: 60   Resp: 16   Temp: (!) 96 2 °F (35 7 °C)     Wt Readings from Last 3 Encounters:   18 58 2 kg (128 lb 6 4 oz)   18 58 2 kg (128 lb 6 4 oz)   10/02/17 58 1 kg (128 lb)       Physical Exam   Constitutional: She is oriented to person, place, and time  She appears well-developed and well-nourished  HENT:   Right Ear: External ear normal    Left Ear: External ear normal    Nose: Nose normal    Mouth/Throat: Oropharynx is clear and moist    Tympanic membranes normal  Pharynx clear   Eyes: Conjunctivae and EOM are normal  Pupils are equal, round, and reactive to light  Neck: Normal range of motion  Neck supple  No thyromegaly present  Cardiovascular: Normal rate, regular rhythm and normal heart sounds  No murmur heard    Pulmonary/Chest: Effort normal and breath sounds normal    Abdominal: Soft  Bowel sounds are normal  There is no tenderness  NO hepatospenomegaly   Musculoskeletal: Normal range of motion  She exhibits no edema  Lymphadenopathy:     She has no cervical adenopathy  Neurological: She is alert and oriented to person, place, and time  Skin:   NO RASHES   Psychiatric: She has a normal mood and affect  Nursing note and vitals reviewed        Pertinent Laboratory/Diagnostic Studies:  Lab Results   Component Value Date    GLUCOSE 105 10/04/2016    BUN 15 10/03/2017    CREATININE 0 77 10/03/2017    CALCIUM 9 1 10/03/2017     (L) 10/03/2017    K 4 1 10/03/2017    CO2 27 10/03/2017     10/03/2017     Lab Results   Component Value Date    ALT 26 10/03/2017    AST 11 10/03/2017    ALKPHOS 90 10/03/2017    BILITOT 0 60 10/03/2017       Lab Results   Component Value Date    WBC 8 42 10/03/2017    HGB 14 6 10/03/2017    HCT 43 8 10/03/2017    MCV 89 10/03/2017     10/03/2017       No results found for: TSH    Lab Results   Component Value Date    CHOL 116 10/03/2017     Lab Results   Component Value Date    TRIG 83 10/03/2017     Lab Results   Component Value Date    HDL 42 10/03/2017     Lab Results   Component Value Date    LDLCALC 57 10/03/2017     No results found for: HGBA1C    Results for orders placed or performed in visit on 10/03/17   CBC and differential   Result Value Ref Range    WBC 8 42 4 31 - 10 16 Thousand/uL    RBC 4 92 3 81 - 5 12 Million/uL    Hemoglobin 14 6 11 5 - 15 4 g/dL    Hematocrit 43 8 34 8 - 46 1 %    MCV 89 82 - 98 fL    MCH 29 7 26 8 - 34 3 pg    MCHC 33 3 31 4 - 37 4 g/dL    RDW 13 2 11 6 - 15 1 %    MPV 10 8 8 9 - 12 7 fL    Platelets 923 303 - 821 Thousands/uL    nRBC 0 /100 WBCs    Neutrophils Relative 58 43 - 75 %    Lymphocytes Relative 30 14 - 44 %    Monocytes Relative 11 4 - 12 %    Eosinophils Relative 1 0 - 6 %    Basophils Relative 0 0 - 1 %    Neutrophils Absolute 4 85 1 85 - 7 62 Thousands/µL    Lymphocytes Absolute 2 52 0 60 - 4 47 Thousands/µL    Monocytes Absolute 0 92 0 17 - 1 22 Thousand/µL    Eosinophils Absolute 0 10 0 00 - 0 61 Thousand/µL    Basophils Absolute 0 01 0 00 - 0 10 Thousands/µL   Comprehensive metabolic panel   Result Value Ref Range    Sodium 134 (L) 136 - 145 mmol/L    Potassium 4 1 3 5 - 5 3 mmol/L    Chloride 100 100 - 108 mmol/L    CO2 27 21 - 32 mmol/L    Anion Gap 7 4 - 13 mmol/L    BUN 15 5 - 25 mg/dL    Creatinine 0 77 0 60 - 1 30 mg/dL    Glucose, Fasting 91 65 - 99 mg/dL    Calcium 9 1 8 3 - 10 1 mg/dL    AST 11 5 - 45 U/L    ALT 26 12 - 78 U/L    Alkaline Phosphatase 90 46 - 116 U/L    Total Protein 7 7 6 4 - 8 2 g/dL    Albumin 3 8 3 5 - 5 0 g/dL    Total Bilirubin 0 60 0 20 - 1 00 mg/dL    eGFR 75 ml/min/1 73sq m   Lipid panel   Result Value Ref Range    Cholesterol 116 50 - 200 mg/dL    Triglycerides 83 <=150 mg/dL    HDL, Direct 42 40 - 60 mg/dL    LDL Calculated 57 0 - 100 mg/dL   TSH, 3rd generation   Result Value Ref Range    TSH 3RD GENERATON 2 410 0 358 - 3 740 uIU/mL       Orders Placed This Encounter   Procedures    CBC    Comprehensive metabolic panel    Lipid panel    TSH, 3rd generation       ALLERGIES:  No Known Allergies    Current Medications     Current Outpatient Prescriptions   Medication Sig Dispense Refill    amLODIPine (NORVASC) 10 mg tablet Take 10 mg by mouth daily      aspirin (ECOTRIN LOW STRENGTH) 81 mg EC tablet Take 81 mg by mouth daily      atorvastatin (LIPITOR) 40 mg tablet Take 40 mg by mouth daily      levothyroxine 25 mcg tablet take 1 tablet by mouth once daily 30 tablet 5    losartan-hydrochlorothiazide (HYZAAR) 100-25 MG per tablet Take 1 tablet by mouth daily      metoprolol tartrate (LOPRESSOR) 50 mg tablet Take 50 mg by mouth every 12 (twelve) hours        Multiple Vitamins-Minerals (PRESERVISION AREDS PO) Take by mouth daily      potassium chloride (K-DUR,KLOR-CON) 20 mEq tablet Take 20 mEq by mouth 2 (two) times a day       No current facility-administered medications for this visit  Health Maintenance     Health Maintenance   Topic Date Due    SLP PLAN OF CARE  1940    Depression Screening PHQ-9  12/26/1952    DTaP,Tdap,and Td Vaccines (1 - Tdap) 12/26/1961    PNEUMOCOCCAL POLYSACCHARIDE VACCINE AGE 72 AND OVER  12/26/2005    GLAUCOMA SCREENING 67+ YR  12/26/2007    INFLUENZA VACCINE  09/01/2017    Fall Risk  04/03/2019    Urinary Incontinence Screening  04/03/2019       There is no immunization history on file for this patient      Chanell Harper MD

## 2018-04-03 NOTE — ASSESSMENT & PLAN NOTE
Hypertension  Patient blood pressure is stable at this time she will continue with current regimen of medications    She will obtain blood work as ordered

## 2018-04-03 NOTE — ASSESSMENT & PLAN NOTE
Hyperlipidemia    Patient will have lipid panel blood work and continue with current dose of statin therapy

## 2018-04-03 NOTE — PROGRESS NOTES
HPI:  Kassie Gowers is a 68 y o  female here for her Subsequent Wellness Visit  Patient Active Problem List   Diagnosis    Arteriosclerosis of coronary artery    Hyperlipidemia    Hypertension    Esophageal reflux    Generalized anxiety disorder    Hypothyroidism    Well adult exam     Past Medical History:   Diagnosis Date    Coronary artery disease      Past Surgical History:   Procedure Laterality Date    CARDIAC CATHETERIZATION      post cath with PCI to the proximal LAD with a xience stent 2013    CORONARY ANGIOPLASTY WITH STENT PLACEMENT       Family History   Problem Relation Age of Onset    Heart attack Mother      prior MI,  at the age of 80    Coronary artery disease Father     Hypertension Father     Cancer Sister     Coronary artery disease Sister     Diabetes Sister     Hyperlipidemia Sister     Diabetes Brother      History   Smoking Status    Never Smoker   Smokeless Tobacco    Never Used     History   Alcohol Use    Yes      History   Drug use: Unknown     There were no vitals taken for this visit  Current Outpatient Prescriptions   Medication Sig Dispense Refill    amLODIPine (NORVASC) 10 mg tablet Take 10 mg by mouth daily      aspirin (ECOTRIN LOW STRENGTH) 81 mg EC tablet Take 81 mg by mouth daily      atorvastatin (LIPITOR) 40 mg tablet Take 40 mg by mouth daily      levothyroxine 25 mcg tablet take 1 tablet by mouth once daily 30 tablet 5    losartan-hydrochlorothiazide (HYZAAR) 100-25 MG per tablet Take 1 tablet by mouth daily      metoprolol tartrate (LOPRESSOR) 50 mg tablet Take 50 mg by mouth every 12 (twelve) hours        Multiple Vitamins-Minerals (PRESERVISION AREDS PO) Take by mouth daily      potassium chloride (K-DUR,KLOR-CON) 20 mEq tablet Take 20 mEq by mouth 2 (two) times a day       No current facility-administered medications for this visit        No Known Allergies    There is no immunization history on file for this patient      Patient Care Team:  Arsen Haynes MD as PCP - MD Arsen Abbasi MD      Medicare Screening Tests and Risk Assessments:  AWV Clinical     ISAR:   Previous hospitalizations?:  No       Once in a Lifetime Medicare Screening:   EKG performed?:  No        Medicare Screening Tests and Risk Assessment:   AAA Risk Assessment    None Indicated:  Yes    Osteoporosis Risk Assessment     Female:  Yes   :  Yes    Age over 48:  Yes    HIV Risk Assessment    None indicated:  Yes        Drug and Alcohol Use:   Tobacco use    Cigarettes:  never smoker    Smokeless:  never used smokeless tobacco    Tobacco use duration    Tobacco Cessation Readiness    Alcohol use    Alcohol use:  never    Alcohol Treatment Readiness   Illicit Drug Use    Drug use:  never        Diet & Exercise:   Diet   What is your diet?:  Regular   How many servings a day of the following:   Exercise    Do you currently exercise?:  yes    Frequency:  daily    Minutes per day:  30    Type of exercise:  walking       Cognitive Impairment Screening:   Depression screening preformed:  Yes     PHQ-9 Depression scale score:  0   Depression screening results:  negative for symptoms   Cognitive Impairment Screening    Do you have difficulty learning or retaining new information?:  No Do you have difficulty handling new tasks?:  No   Do you have difficulty with reasoning?:  No Do you have difficulty with spatial ability and orientation?:  No   Do you have difficulty with language?:  No Do you have difficulty with behavior?:  No       Functional Ability/Level of Safety:   Hearing    Hearing difficulties:  No Bilateral:  normal   Left:  normal Right:  normal   Hearing Impairment Assessment    Hearing status:  No impairment   Current Activities    Status:  unlimited ADL's, unlimited IADL's, unlimited social activities, unlimited driving   Help needed with the folllowing:    Using the phone:  No Transportation:  No   Shopping:  No Preparing Meals:  No   Doing Housework:  No Doing Laundry:  No   Managing Medications:  No Managing Money:  No   ADL    Fall Risk   Have you fallen in the last 12 months?:  No Are you unsteady on your feet?:  No   Injury History       Home Safety:   Are there hazards in your environment?:  No   Do you feel unsteady when walking?:  No    Do you have handrails and grab-bars in the home?:  No    Home Safety Risk Factors   Unfamilar with surroundings:  No Uneven floors:  No   Stairs or handrail saftey risk:  No Loose rugs:  No   Household clutter:  No Poor household lighting:  No   No grab bars in bathroom:  Yes Further evaluation needed:  No       Advanced Directives:   Advanced Directives    Living Will:  No Durable POA for healthcare:  No   Advanced directive:  No    Patient's End of Life Decisions        Urinary Incontinence:   Do you have urinary incontinence?:  No        Glaucoma:            Provider Screening     Preventative Screening/Counseling:   Cardiovascular Screening/Counseling:   (Labs Q5 years, EKG optional one-time)   General:  Screening Current           Diabetes Screening/Counseling:   (2 tests/year if Pre-Diabetes or 1 test/year if no Diabetes)   General:  Screening Current           Colorectal Cancer Screening/Counseling:   (FOBT Q1 yr; Flex Sig Q4 yrs or Q10 yrs after Screening Colonoscopy; Screening Colonoscpy Q2 yrs High Risk or Q10 yrs Low Risk; Barium Enema Q2 yrs High Risk or Q4 yrs Low Risk)   General:  Screening Not Indicated           Prostate Cancer Screening/Counseling:   (Annual)          Breast Cancer Screening/Counseling:   (Baseline Age 28 - 43; Annual Age 36+)   General:  Screening Not Indicated          Cervical Cancer Screening/Counseling:   (Annual for High Risk or Childbearing Age with Abnormal Pap in Last 3 yrs;  Every 2 all others)   General:  Screening Not Indicated           Osteoporosis Screening/Counseling:   (Every 2 Yrs if at risk or more if medically necessary) AAA Screening/Counseling:   (Once per Lifetime with risk factors)          Glaucoma Screening/Counseling:   (Annual)         HIV Screening/Counseling:   (Voluntary; Once annually for high risk OR 3 times for Pregnancy at diagnosis of IUP; 3rd trimester; and at Labor         Hepatitis C Screening:             Immunizations:   Influenza (annual):  Risks & Benefits Discussed, Influenza Recommended Annually, Patient Declines   Pneumococcal (Once in a Lifetime):  Patient Declines, Risks & Benefits Discussed   Zostavax (Medicare D Coverage, Pt >72 yo):  Risks & Benefits Discussed, Patient Declines       Other Preventative Couseling (Non-Medicare Wellness Visit Required):       Referrals (Non-Medicare Wellness Visit Required):       Medical Equipment/Suppliers:           No exam data present  Reviewed Updated [facility] Prior Wellness Visits:   Last Medicare wellness visit information was reviewed, patient interviewed , no change since last AWVyes  Last Medicare wellness visit information was reviewed, patient interviewed and updates made to the record today yes    Assessment and Plan:  1   Well adult exam         Health Maintenance Due   Topic Date Due    SLP PLAN OF CARE  1940    Depression Screening PHQ-9  12/26/1952    DTaP,Tdap,and Td Vaccines (1 - Tdap) 12/26/1961    PNEUMOCOCCAL POLYSACCHARIDE VACCINE AGE 65 AND OVER  12/26/2005    GLAUCOMA SCREENING 67+ YR  12/26/2007    INFLUENZA VACCINE  09/01/2017

## 2018-04-03 NOTE — ASSESSMENT & PLAN NOTE
Coronary artery disease  Patient is asymptomatic at this time  She is up-to-date with her cardiology office visit

## 2018-04-04 ENCOUNTER — TELEPHONE (OUTPATIENT)
Dept: FAMILY MEDICINE CLINIC | Facility: CLINIC | Age: 78
End: 2018-04-04

## 2018-04-04 NOTE — TELEPHONE ENCOUNTER
----- Message from Prashant Shelton MD sent at 0/2/2212  7:06 AM EDT -----  All blood work stable for patient

## 2018-07-10 DIAGNOSIS — I10 ESSENTIAL HYPERTENSION: Primary | ICD-10-CM

## 2018-07-10 DIAGNOSIS — E87.6 HYPOKALEMIA: Primary | ICD-10-CM

## 2018-07-10 RX ORDER — POTASSIUM CHLORIDE 1500 MG/1
TABLET, FILM COATED, EXTENDED RELEASE ORAL
Qty: 180 TABLET | Refills: 1 | Status: SHIPPED | OUTPATIENT
Start: 2018-07-10 | End: 2018-12-04 | Stop reason: SDUPTHER

## 2018-07-11 RX ORDER — AMLODIPINE BESYLATE 10 MG/1
TABLET ORAL
Qty: 90 TABLET | Refills: 3 | Status: SHIPPED | OUTPATIENT
Start: 2018-07-11 | End: 2019-09-04 | Stop reason: SDUPTHER

## 2018-07-13 DIAGNOSIS — E03.9 HYPOTHYROIDISM, UNSPECIFIED TYPE: ICD-10-CM

## 2018-07-13 RX ORDER — LEVOTHYROXINE SODIUM 0.03 MG/1
25 TABLET ORAL DAILY
Qty: 90 TABLET | Refills: 0 | Status: SHIPPED | OUTPATIENT
Start: 2018-07-13 | End: 2018-09-19 | Stop reason: SDUPTHER

## 2018-09-11 ENCOUNTER — TELEPHONE (OUTPATIENT)
Dept: FAMILY MEDICINE CLINIC | Facility: CLINIC | Age: 78
End: 2018-09-11

## 2018-09-11 ENCOUNTER — TELEPHONE (OUTPATIENT)
Dept: CARDIOLOGY CLINIC | Facility: CLINIC | Age: 78
End: 2018-09-11

## 2018-09-11 DIAGNOSIS — F41.9 ANXIETY: Primary | ICD-10-CM

## 2018-09-11 RX ORDER — LORAZEPAM 0.5 MG/1
0.5 TABLET ORAL 2 TIMES DAILY PRN
Qty: 40 TABLET | Refills: 0 | Status: SHIPPED | OUTPATIENT
Start: 2018-09-11 | End: 2020-01-24

## 2018-09-11 NOTE — TELEPHONE ENCOUNTER
Tanna Salazar called, her sister passed away last night and she is requesting something for anxiety  Gave her my condolences and advised to call PCP-- verbally understood

## 2018-09-11 NOTE — TELEPHONE ENCOUNTER
Jeans sister passed away last night and she would like to know if you could rx something for her to get throught the next couple days

## 2018-09-19 DIAGNOSIS — I48.0 PAROXYSMAL ATRIAL FIBRILLATION (HCC): Primary | ICD-10-CM

## 2018-09-19 DIAGNOSIS — E03.9 HYPOTHYROIDISM, UNSPECIFIED TYPE: ICD-10-CM

## 2018-09-19 RX ORDER — LEVOTHYROXINE SODIUM 0.03 MG/1
TABLET ORAL
Qty: 90 TABLET | Refills: 1 | Status: SHIPPED | OUTPATIENT
Start: 2018-09-19 | End: 2019-03-07 | Stop reason: SDUPTHER

## 2018-09-19 RX ORDER — METOPROLOL TARTRATE 50 MG/1
TABLET, FILM COATED ORAL
Qty: 180 TABLET | Refills: 3 | Status: SHIPPED | OUTPATIENT
Start: 2018-09-19 | End: 2019-07-30 | Stop reason: SDUPTHER

## 2018-10-03 ENCOUNTER — TELEPHONE (OUTPATIENT)
Dept: FAMILY MEDICINE CLINIC | Facility: CLINIC | Age: 78
End: 2018-10-03

## 2018-10-03 ENCOUNTER — OFFICE VISIT (OUTPATIENT)
Dept: FAMILY MEDICINE CLINIC | Facility: CLINIC | Age: 78
End: 2018-10-03
Payer: MEDICARE

## 2018-10-03 ENCOUNTER — APPOINTMENT (OUTPATIENT)
Dept: LAB | Facility: CLINIC | Age: 78
End: 2018-10-03
Payer: MEDICARE

## 2018-10-03 VITALS
TEMPERATURE: 96.9 F | HEART RATE: 76 BPM | DIASTOLIC BLOOD PRESSURE: 80 MMHG | RESPIRATION RATE: 16 BRPM | SYSTOLIC BLOOD PRESSURE: 120 MMHG | WEIGHT: 127 LBS | BODY MASS INDEX: 25.6 KG/M2 | HEIGHT: 59 IN

## 2018-10-03 DIAGNOSIS — E78.5 HYPERLIPIDEMIA, UNSPECIFIED HYPERLIPIDEMIA TYPE: ICD-10-CM

## 2018-10-03 DIAGNOSIS — I10 ESSENTIAL HYPERTENSION: Primary | ICD-10-CM

## 2018-10-03 DIAGNOSIS — E03.9 HYPOTHYROIDISM, UNSPECIFIED TYPE: ICD-10-CM

## 2018-10-03 DIAGNOSIS — I10 ESSENTIAL HYPERTENSION: ICD-10-CM

## 2018-10-03 LAB
ALBUMIN SERPL BCP-MCNC: 3.6 G/DL (ref 3.5–5)
ALP SERPL-CCNC: 93 U/L (ref 46–116)
ALT SERPL W P-5'-P-CCNC: 23 U/L (ref 12–78)
ANION GAP SERPL CALCULATED.3IONS-SCNC: 9 MMOL/L (ref 4–13)
AST SERPL W P-5'-P-CCNC: 12 U/L (ref 5–45)
BILIRUB SERPL-MCNC: 0.68 MG/DL (ref 0.2–1)
BUN SERPL-MCNC: 11 MG/DL (ref 5–25)
CALCIUM SERPL-MCNC: 9.1 MG/DL (ref 8.3–10.1)
CHLORIDE SERPL-SCNC: 99 MMOL/L (ref 100–108)
CHOLEST SERPL-MCNC: 92 MG/DL (ref 50–200)
CO2 SERPL-SCNC: 26 MMOL/L (ref 21–32)
CREAT SERPL-MCNC: 0.74 MG/DL (ref 0.6–1.3)
ERYTHROCYTE [DISTWIDTH] IN BLOOD BY AUTOMATED COUNT: 12.7 % (ref 11.6–15.1)
GFR SERPL CREATININE-BSD FRML MDRD: 78 ML/MIN/1.73SQ M
GLUCOSE P FAST SERPL-MCNC: 106 MG/DL (ref 65–99)
HCT VFR BLD AUTO: 42.6 % (ref 34.8–46.1)
HDLC SERPL-MCNC: 40 MG/DL (ref 40–60)
HGB BLD-MCNC: 14 G/DL (ref 11.5–15.4)
LDLC SERPL CALC-MCNC: 39 MG/DL (ref 0–100)
MCH RBC QN AUTO: 29.5 PG (ref 26.8–34.3)
MCHC RBC AUTO-ENTMCNC: 32.9 G/DL (ref 31.4–37.4)
MCV RBC AUTO: 90 FL (ref 82–98)
NONHDLC SERPL-MCNC: 52 MG/DL
PLATELET # BLD AUTO: 238 THOUSANDS/UL (ref 149–390)
PMV BLD AUTO: 10.1 FL (ref 8.9–12.7)
POTASSIUM SERPL-SCNC: 4.2 MMOL/L (ref 3.5–5.3)
PROT SERPL-MCNC: 7.2 G/DL (ref 6.4–8.2)
RBC # BLD AUTO: 4.74 MILLION/UL (ref 3.81–5.12)
SODIUM SERPL-SCNC: 134 MMOL/L (ref 136–145)
TRIGL SERPL-MCNC: 64 MG/DL
TSH SERPL DL<=0.05 MIU/L-ACNC: 1.57 UIU/ML (ref 0.36–3.74)
WBC # BLD AUTO: 7.21 THOUSAND/UL (ref 4.31–10.16)

## 2018-10-03 PROCEDURE — 80053 COMPREHEN METABOLIC PANEL: CPT

## 2018-10-03 PROCEDURE — 80061 LIPID PANEL: CPT

## 2018-10-03 PROCEDURE — 85027 COMPLETE CBC AUTOMATED: CPT

## 2018-10-03 PROCEDURE — 84443 ASSAY THYROID STIM HORMONE: CPT

## 2018-10-03 PROCEDURE — 36415 COLL VENOUS BLD VENIPUNCTURE: CPT

## 2018-10-03 PROCEDURE — 99214 OFFICE O/P EST MOD 30 MIN: CPT | Performed by: FAMILY MEDICINE

## 2018-10-03 NOTE — ASSESSMENT & PLAN NOTE
Hypertension  Patient blood pressure is stable at this time in she will continue with current regimen of medications    She will follow up with her cardiologist next month

## 2018-10-03 NOTE — TELEPHONE ENCOUNTER
----- Message from Tanika Soria MD sent at 14/0/8134  1:01 PM EDT -----  All recent blood work stable for patient

## 2018-10-03 NOTE — PROGRESS NOTES
FAMILY PRACTICE OFFICE VISIT       NAME: Rachel Boateng  AGE: 68 y o  SEX: female       : 1940        MRN: [id number]    DATE: 10/3/2018  TIME: 8:09 AM    Assessment and Plan     Problem List Items Addressed This Visit     Hyperlipidemia     Hyperlipidemia  Patient will have lipid panel blood work and continue with current dose of statin therapy         Relevant Orders    Lipid panel    Hypertension - Primary     Hypertension  Patient blood pressure is stable at this time in she will continue with current regimen of medications  She will follow up with her cardiologist next month         Relevant Orders    CBC    Comprehensive metabolic panel    Lipid panel    TSH, 3rd generation    Hypothyroidism     Hypothyroidism  Patient will have TSH blood work and continue with current dose of levothyroxine             Patient refused annual flu vaccine or pneumonia or shingles vaccines    There are no Patient Instructions on file for this visit  Chief Complaint     Chief Complaint   Patient presents with    Follow-up       History of Present Illness     Patient denies any recent illness  She is still mourning the death of her sister who  suddenly from a suspected heart attack 3 weeks ago  She does see her cardiologist next month  She denies any chest pain or shortness of breath        Review of Systems   Review of Systems   Constitutional: Negative  HENT: Negative  Respiratory: Negative  Cardiovascular: Negative  Gastrointestinal: Negative  Genitourinary: Negative  Musculoskeletal: Negative  Neurological: Negative      Psychiatric/Behavioral:        As per hpi       Active Problem List     Patient Active Problem List   Diagnosis    Arteriosclerosis of coronary artery    Hyperlipidemia    Hypertension    Esophageal reflux    Generalized anxiety disorder    Hypothyroidism    Well adult exam       Past Medical History:  Past Medical History:   Diagnosis Date    Coronary artery disease        Past Surgical History:  Past Surgical History:   Procedure Laterality Date    CARDIAC CATHETERIZATION      post cath with PCI to the proximal LAD with a xience stent 2013    CORONARY ANGIOPLASTY WITH STENT PLACEMENT         Family History:  Family History   Problem Relation Age of Onset    Heart attack Mother         prior MI,  at the age of 80   Sharona Thomson Coronary artery disease Father     Hypertension Father     Cancer Sister     Coronary artery disease Sister     Diabetes Sister     Hyperlipidemia Sister     Diabetes Brother        Social History:  Social History     Social History    Marital status: Single     Spouse name: N/A    Number of children: N/A    Years of education: N/A     Occupational History    Not on file  Social History Main Topics    Smoking status: Never Smoker    Smokeless tobacco: Never Used    Alcohol use Yes    Drug use: Unknown    Sexual activity: Not on file     Other Topics Concern    Not on file     Social History Narrative    Daily coffee consumption (4 cups/day)       Objective     Vitals:    10/03/18 0749   BP: 120/80   Pulse:    Resp:    Temp:      Wt Readings from Last 3 Encounters:   10/03/18 57 6 kg (127 lb)   18 58 2 kg (128 lb 6 4 oz)   18 58 2 kg (128 lb 6 4 oz)       Physical Exam   Constitutional: No distress  HENT:   Mouth/Throat: Oropharynx is clear and moist  No oropharyngeal exudate  Tympanic membranes within normal limits bilaterally   Neck:   No carotid bruits   Cardiovascular:   Regular rate and rhythm with no murmurs   Pulmonary/Chest:   Lungs are clear to auscultation without wheezes,rales, or rhonchi   Abdominal:   Abdomen is soft, nontender with positive bowel sounds  There is no rebound or guarding  No masses palpated   Musculoskeletal: She exhibits no edema  Lymphadenopathy:     She has no cervical adenopathy         Pertinent Laboratory/Diagnostic Studies:  Lab Results   Component Value Date GLUCOSE 97 10/26/2015    BUN 14 04/03/2018    CREATININE 0 74 04/03/2018    CALCIUM 9 2 04/03/2018     04/03/2018    K 4 5 04/03/2018    CO2 28 04/03/2018     04/03/2018     Lab Results   Component Value Date    ALT 18 04/03/2018    AST 13 04/03/2018    ALKPHOS 94 04/03/2018    BILITOT 0 93 10/26/2015       Lab Results   Component Value Date    WBC 7 35 04/03/2018    HGB 14 0 04/03/2018    HCT 43 9 04/03/2018    MCV 91 04/03/2018     04/03/2018       No results found for: TSH    Lab Results   Component Value Date    CHOL 99 10/26/2015     Lab Results   Component Value Date    TRIG 78 04/03/2018     Lab Results   Component Value Date    HDL 45 04/03/2018     Lab Results   Component Value Date    LDLCALC 48 04/03/2018     No results found for: HGBA1C    Results for orders placed or performed in visit on 04/03/18   CBC   Result Value Ref Range    WBC 7 35 4 31 - 10 16 Thousand/uL    RBC 4 82 3 81 - 5 12 Million/uL    Hemoglobin 14 0 11 5 - 15 4 g/dL    Hematocrit 43 9 34 8 - 46 1 %    MCV 91 82 - 98 fL    MCH 29 0 26 8 - 34 3 pg    MCHC 31 9 31 4 - 37 4 g/dL    RDW 13 2 11 6 - 15 1 %    Platelets 228 138 - 713 Thousands/uL    MPV 10 6 8 9 - 12 7 fL   Comprehensive metabolic panel   Result Value Ref Range    Sodium 139 136 - 145 mmol/L    Potassium 4 5 3 5 - 5 3 mmol/L    Chloride 105 100 - 108 mmol/L    CO2 28 21 - 32 mmol/L    ANION GAP 6 4 - 13 mmol/L    BUN 14 5 - 25 mg/dL    Creatinine 0 74 0 60 - 1 30 mg/dL    Glucose, Fasting 94 65 - 99 mg/dL    Calcium 9 2 8 3 - 10 1 mg/dL    AST 13 5 - 45 U/L    ALT 18 12 - 78 U/L    Alkaline Phosphatase 94 46 - 116 U/L    Total Protein 7 5 6 4 - 8 2 g/dL    Albumin 3 8 3 5 - 5 0 g/dL    Total Bilirubin 0 63 0 20 - 1 00 mg/dL    eGFR 78 ml/min/1 73sq m   Lipid panel   Result Value Ref Range    Cholesterol 109 50 - 200 mg/dL    Triglycerides 78 <=150 mg/dL    HDL, Direct 45 40 - 60 mg/dL    LDL Calculated 48 0 - 100 mg/dL   TSH, 3rd generation   Result Value Ref Range    TSH 3RD GENERATON 1 830 0 358 - 3 740 uIU/mL       Orders Placed This Encounter   Procedures    CBC    Comprehensive metabolic panel    Lipid panel    TSH, 3rd generation       ALLERGIES:  No Known Allergies    Current Medications     Current Outpatient Prescriptions   Medication Sig Dispense Refill    amLODIPine (NORVASC) 10 mg tablet TAKE 1 TABLET EVERY DAY 90 tablet 3    aspirin (ECOTRIN LOW STRENGTH) 81 mg EC tablet Take 81 mg by mouth daily      atorvastatin (LIPITOR) 40 mg tablet Take 40 mg by mouth daily      levothyroxine 25 mcg tablet TAKE 1 TABLET EVERY DAY 90 tablet 1    LORazepam (ATIVAN) 0 5 mg tablet Take 1 tablet (0 5 mg total) by mouth 2 (two) times a day as needed for anxiety 40 tablet 0    losartan-hydrochlorothiazide (HYZAAR) 100-25 MG per tablet Take 1 tablet by mouth daily      metoprolol tartrate (LOPRESSOR) 50 mg tablet TAKE 1 TABLET TWICE DAILY 180 tablet 3    Multiple Vitamins-Minerals (PRESERVISION AREDS PO) Take by mouth daily      Potassium Chloride ER 20 MEQ TBCR TAKE 1 TABLET TWICE DAILY 180 tablet 1     No current facility-administered medications for this visit  Health Maintenance     Health Maintenance   Topic Date Due    SLP PLAN OF CARE  1940    DTaP,Tdap,and Td Vaccines (1 - Tdap) 12/26/1961    Pneumococcal PPSV23/PCV13 65+ Years / Low and Medium Risk (1 of 2 - PCV13) 12/26/2005    INFLUENZA VACCINE  09/01/2018    Fall Risk  04/03/2019    Depression Screening PHQ  04/03/2019    Urinary Incontinence Screening  04/03/2019       There is no immunization history on file for this patient      Lionel Williamson MD

## 2018-10-08 ENCOUNTER — TELEPHONE (OUTPATIENT)
Dept: FAMILY MEDICINE CLINIC | Facility: CLINIC | Age: 78
End: 2018-10-08

## 2018-11-01 ENCOUNTER — OFFICE VISIT (OUTPATIENT)
Dept: CARDIOLOGY CLINIC | Facility: CLINIC | Age: 78
End: 2018-11-01
Payer: MEDICARE

## 2018-11-01 VITALS
BODY MASS INDEX: 25.36 KG/M2 | SYSTOLIC BLOOD PRESSURE: 130 MMHG | HEIGHT: 59 IN | OXYGEN SATURATION: 100 % | WEIGHT: 125.8 LBS | HEART RATE: 54 BPM | DIASTOLIC BLOOD PRESSURE: 80 MMHG

## 2018-11-01 DIAGNOSIS — I10 ESSENTIAL HYPERTENSION: ICD-10-CM

## 2018-11-01 DIAGNOSIS — E78.2 MIXED HYPERLIPIDEMIA: ICD-10-CM

## 2018-11-01 DIAGNOSIS — I25.10 ARTERIOSCLEROSIS OF CORONARY ARTERY: Primary | ICD-10-CM

## 2018-11-01 PROCEDURE — 99214 OFFICE O/P EST MOD 30 MIN: CPT | Performed by: INTERNAL MEDICINE

## 2018-11-01 PROCEDURE — 93000 ELECTROCARDIOGRAM COMPLETE: CPT | Performed by: INTERNAL MEDICINE

## 2018-11-01 NOTE — PATIENT INSTRUCTIONS
Coronary Artery Disease   AMBULATORY CARE:   Coronary artery disease (CAD)  is narrowing of the arteries to your heart caused by a buildup of plaque  Plaque is made up of cholesterol and other substances  The narrowing in your arteries decreases the amount of blood that can flow to your heart  This causes your heart to get less oxygen  You may not have any symptoms of CAD  It is important for you to manage CAD even if you feel well  CAD can lead to a heart attack if it is not managed  Common symptoms include the following:   · Chest pain (angina), causing burning, squeezing, or crushing tightness in your chest    · Pain that spreads to your neck, jaw, or shoulder blade    · Nausea, vomiting, sweating, fainting, and hands and feet that are cold to the touch  Call 911 for any of the following:   · You have any of the following signs of a heart attack:      ¨ Squeezing, pressure, or pain in your chest that lasts longer than 5 minutes or returns    ¨ Discomfort or pain in your back, neck, jaw, stomach, or arm     ¨ Trouble breathing    ¨ Nausea or vomiting    ¨ Lightheadedness or a sudden cold sweat, especially with chest pain or trouble breathing    Contact your healthcare provider if:   · You have chest pain that is more frequent, or you have chest pain at rest     · You have questions or concerns about your condition or care  Medicines used to treat CAD:   · Blood pressure medicines  are given to lower your blood pressure  ACE inhibitors help keep your blood vessels relaxed and open to help keep blood flowing into your heart  Beta-blockers keep your heart pumping strongly and regularly so it does not work too hard to get oxygen  · Cholesterol medicines  help lower blood cholesterol levels  · Nitrates , such as nitroglycerin, relax the arteries of your heart so it gets more oxygen  They help to relieve your chest pain  · Antiplatelets , such as aspirin, help prevent blood clots   Take your antiplatelet medicine exactly as directed  These medicines make it more likely for you to bleed or bruise  If you are told to take aspirin, do not take acetaminophen or ibuprofen instead  · Blood thinners  keep clots from forming in your blood  Clots may cause heart attacks, strokes, or death  This medicine makes it more likely for you to bleed or bruise  · Do not take certain medicines without asking your healthcare provider first   These include NSAIDs, herbal or vitamin supplements, or hormones (estrogen or progestin)  Procedures used to treat CAD:   · Angioplasty  may be done to open an artery blocked by plaque  A tube with a balloon on the end is threaded into the blocked artery  Once the tube is in the artery, the balloon is inflated  As the balloon inflates, it presses the plaque against the artery wall to open the artery  A stent may be placed in your artery to keep it open  · Coronary artery bypass graft surgery (CABG)  is open heart surgery  Healthcare providers take arteries or veins from other areas in your body and use them to bypass or go around the blocked arteries of your heart  Cardiac rehabilitation:  Your healthcare provider may recommend that you attend cardiac rehabilitation (rehab)  This is a program run by specialists who will help you safely strengthen your heart and reduce the risk for more heart disease  The plan includes exercise, relaxation, stress management, and heart-healthy nutrition  Healthcare providers will also check to make sure any medicines you are taking are working  Manage CAD to prevent a heart attack:   · Do not smoke  Nicotine and other chemicals in cigarettes and cigars can cause heart and lung damage  Ask your healthcare provider for information if you currently smoke and need help to quit  E-cigarettes or smokeless tobacco still contain nicotine  Talk to your healthcare provider before you use these products  · Exercise regularly    Exercise at least 30 minutes each day, on most days of the week  Exercise helps to lower high cholesterol and high blood pressure  It can also help you maintain a healthy weight  Ask your healthcare provider about the kind of exercise you should do and how to get started  · Maintain a healthy weight  If you are overweight, talk to your healthcare provider about how to lose weight  A weight loss of 10% can improve your heart health  · Eat heart-healthy foods  Include fresh fruits and vegetables in your meal plan  Choose low-fat foods, such as skim or 1% fat milk, low-fat cheese and yogurt, fish, chicken (without skin), and lean meats  Eat two 4-ounce servings of fish high in omega-3 fats each week, such as salmon, fresh tuna, and herring  Do not eat foods that are high in sodium, such as canned foods, potato chips, salty snacks, and cold cuts  Put less table salt on your food  · Limit or do not drink alcohol  A drink of alcohol is 12 ounces of beer, 5 ounces of wine, or 1½ ounces of liquor  · Manage other health conditions  Follow your healthcare provider's advice on how to manage other conditions that can affect your heart health  These include diabetes, high blood pressure, and high cholesterol  You may need to take medicines for these conditions and make other lifestyle changes  · Ask if you should have a flu vaccine  The flu can be dangerous for a person who has CAD  The flu vaccine is available every year in the fall  Follow up with your healthcare provider as directed: You may need to return for other tests  You may also be referred to a cardiac surgeon  Write down your questions so you remember to ask them during your visits  © 2017 [employer]  Information is for End User's use only and may not be sold, redistributed or otherwise used for commercial purposes  All illustrations and images included in CareNotes® are the copyrighted property of [employer]  or [employer]    The above information is an  only  It is not intended as medical advice for individual conditions or treatments  Talk to your doctor, nurse or pharmacist before following any medical regimen to see if it is safe and effective for you

## 2018-11-01 NOTE — PROGRESS NOTES
Cardiology Follow Up    Simon Wright  1940  [id number]     HEART & VASCULAR [facility]  [address] [place] [address] [address]    1  Arteriosclerosis of coronary artery  POCT ECG   2  Essential hypertension     3  Mixed hyperlipidemia       Chief Complaint   Patient presents with    Follow-up     6 months follow up, patient state to feel good denied any cardiac symptoms  Interval History: Patient feels well, no complaints  [place], feels well with activity  Her sister  2 weeks ago due to presumed heart attack  Patient Active Problem List   Diagnosis    Arteriosclerosis of coronary artery    Hyperlipidemia    Hypertension    Esophageal reflux    Generalized anxiety disorder    Hypothyroidism    Well adult exam     Past Medical History:   Diagnosis Date    Coronary artery disease      Social History     Social History    Marital status: Single     Spouse name: N/A    Number of children: N/A    Years of education: N/A     Occupational History    Not on file       Social History Main Topics    Smoking status: Never Smoker    Smokeless tobacco: Never Used    Alcohol use Yes    Drug use: No    Sexual activity: Not on file     Other Topics Concern    Not on file     Social History Narrative    Daily coffee consumption (4 cups/day)      Family History   Problem Relation Age of Onset    Heart attack Mother         prior MI,  at the age of 80   Wash Caller Coronary artery disease Father     Hypertension Father     Cancer Sister     Coronary artery disease Sister     Diabetes Sister     Hyperlipidemia Sister     Diabetes Brother      Past Surgical History:   Procedure Laterality Date    CARDIAC CATHETERIZATION      post cath with PCI to the proximal LAD with a xience stent 2013    CORONARY ANGIOPLASTY WITH STENT PLACEMENT         Current Outpatient Prescriptions:     amLODIPine (NORVASC) 10 mg tablet, TAKE 1 TABLET EVERY DAY, Disp: 90 tablet, Rfl: 3    aspirin (ECOTRIN LOW STRENGTH) 81 mg EC tablet, Take 81 mg by mouth daily, Disp: , Rfl:     atorvastatin (LIPITOR) 40 mg tablet, Take 40 mg by mouth daily, Disp: , Rfl:     levothyroxine 25 mcg tablet, TAKE 1 TABLET EVERY DAY, Disp: 90 tablet, Rfl: 1    LORazepam (ATIVAN) 0 5 mg tablet, Take 1 tablet (0 5 mg total) by mouth 2 (two) times a day as needed for anxiety, Disp: 40 tablet, Rfl: 0    losartan-hydrochlorothiazide (HYZAAR) 100-25 MG per tablet, Take 1 tablet by mouth daily, Disp: , Rfl:     metoprolol tartrate (LOPRESSOR) 50 mg tablet, TAKE 1 TABLET TWICE DAILY, Disp: 180 tablet, Rfl: 3    Multiple Vitamins-Minerals (PRESERVISION AREDS PO), Take by mouth daily, Disp: , Rfl:     Potassium Chloride ER 20 MEQ TBCR, TAKE 1 TABLET TWICE DAILY, Disp: 180 tablet, Rfl: 1  No Known Allergies    Labs:  Appointment on 10/03/2018   Component Date Value    WBC 10/03/2018 7 21     RBC 10/03/2018 4 74     Hemoglobin 10/03/2018 14 0     Hematocrit 10/03/2018 42 6     MCV 10/03/2018 90     MCH 10/03/2018 29 5     MCHC 10/03/2018 32 9     RDW 10/03/2018 12 7     Platelets 43/94/6542 238     MPV 10/03/2018 10 1     Sodium 10/03/2018 134*    Potassium 10/03/2018 4 2     Chloride 10/03/2018 99*    CO2 10/03/2018 26     ANION GAP 10/03/2018 9     BUN 10/03/2018 11     Creatinine 10/03/2018 0 74     Glucose, Fasting 10/03/2018 106*    Calcium 10/03/2018 9 1     AST 10/03/2018 12     ALT 10/03/2018 23     Alkaline Phosphatase 10/03/2018 93     Total Protein 10/03/2018 7 2     Albumin 10/03/2018 3 6     Total Bilirubin 10/03/2018 0 68     eGFR 10/03/2018 78     Cholesterol 10/03/2018 92     Triglycerides 10/03/2018 64     HDL, Direct 10/03/2018 40     LDL Calculated 10/03/2018 39     Non-HDL-Chol (CHOL-HDL) 10/03/2018 52     TSH 3RD GENERATON 10/03/2018 1 570      Lab Results   Component Value Date    CHOL 99 10/26/2015 TRIG 64 10/03/2018    TRIG 64 10/26/2015    HDL 40 10/03/2018    HDL 40 10/26/2015     Imaging: No results found  Review of Systems:  Review of Systems   Constitutional: Negative for activity change, appetite change, chills, diaphoresis, fatigue and unexpected weight change  HENT: Negative for hearing loss, nosebleeds and sore throat  Eyes: Negative for photophobia and visual disturbance  Respiratory: Negative for cough, chest tightness, shortness of breath and wheezing  Cardiovascular: Negative for chest pain, palpitations and leg swelling  Gastrointestinal: Negative for abdominal pain, diarrhea, nausea and vomiting  Endocrine: Negative for polyuria  Genitourinary: Negative for dysuria, frequency and hematuria  Musculoskeletal: Negative for arthralgias, back pain, gait problem and neck pain  Skin: Negative for pallor and rash  Neurological: Negative for dizziness, syncope and headaches  Hematological: Does not bruise/bleed easily  Psychiatric/Behavioral: Negative for behavioral problems and confusion  Physical Exam:  Physical Exam   Constitutional: She is oriented to person, place, and time  She appears well-developed and well-nourished  HENT:   Head: Normocephalic and atraumatic  Nose: Nose normal    Eyes: Pupils are equal, round, and reactive to light  EOM are normal  No scleral icterus  Neck: Normal range of motion  Neck supple  No JVD present  Cardiovascular: Normal rate, regular rhythm and normal heart sounds  Exam reveals no gallop and no friction rub  No murmur heard  Pulmonary/Chest: Effort normal and breath sounds normal  No respiratory distress  She has no wheezes  She has no rales  Abdominal: Soft  Bowel sounds are normal  She exhibits no distension  There is no tenderness  Musculoskeletal: Normal range of motion  She exhibits no edema or deformity  Neurological: She is alert and oriented to person, place, and time  No cranial nerve deficit  Skin: Skin is warm and dry  No rash noted  She is not diaphoretic  Psychiatric: She has a normal mood and affect  Her behavior is normal    Vitals reviewed  Blood pressure 130/80, pulse (!) 54, height 4' 11" (1 499 m), weight 57 1 kg (125 lb 12 8 oz), SpO2 100 %  EKG:  Sinus bradycardia  Septal infarct, age undetermined  ST & T wave abnormality, consider anterior ischemia  Abnormal ECG    Discussion/Summary:  1) CAD: s/p Xience stent Jan 2013, feels ok, compliant with medications  Heart healthy diet with increased fresh fruit and vegetables, lean proteins (chicken, fish, beans), whole grains (brown rice, whole wheat bread, whole wheat pasta), and reduce sugary desserts  Off Effient since it has been over 1 year of therapy since her PATRICK  Continue with baby ASA daily     2) HTN: BP better controlled and at goal, cough improved after lisinopril was stopped  Continue norvasc at 10mg daily along with losartan/HCTZ and metoprolol     3) Hyperlipidemia: LDL at goal, continue statin

## 2018-12-04 DIAGNOSIS — E87.6 HYPOKALEMIA: ICD-10-CM

## 2018-12-04 DIAGNOSIS — E78.2 MIXED HYPERLIPIDEMIA: ICD-10-CM

## 2018-12-04 DIAGNOSIS — I10 ESSENTIAL HYPERTENSION: Primary | ICD-10-CM

## 2018-12-04 RX ORDER — POTASSIUM CHLORIDE 1500 MG/1
TABLET, EXTENDED RELEASE ORAL
Qty: 180 TABLET | Refills: 1 | Status: SHIPPED | OUTPATIENT
Start: 2018-12-04 | End: 2019-07-30 | Stop reason: SDUPTHER

## 2018-12-04 RX ORDER — LOSARTAN POTASSIUM AND HYDROCHLOROTHIAZIDE 25; 100 MG/1; MG/1
TABLET ORAL
Qty: 90 TABLET | Refills: 3 | Status: SHIPPED | OUTPATIENT
Start: 2018-12-04 | End: 2020-02-05

## 2018-12-04 RX ORDER — ATORVASTATIN CALCIUM 40 MG/1
TABLET, FILM COATED ORAL
Qty: 90 TABLET | Refills: 3 | Status: SHIPPED | OUTPATIENT
Start: 2018-12-04 | End: 2020-02-05

## 2019-03-07 DIAGNOSIS — E03.9 HYPOTHYROIDISM, UNSPECIFIED TYPE: ICD-10-CM

## 2019-03-07 RX ORDER — LEVOTHYROXINE SODIUM 0.03 MG/1
TABLET ORAL
Qty: 90 TABLET | Refills: 1 | Status: SHIPPED | OUTPATIENT
Start: 2019-03-07 | End: 2019-07-30 | Stop reason: SDUPTHER

## 2019-04-03 ENCOUNTER — APPOINTMENT (OUTPATIENT)
Dept: LAB | Facility: CLINIC | Age: 79
End: 2019-04-03
Payer: MEDICARE

## 2019-04-03 ENCOUNTER — OFFICE VISIT (OUTPATIENT)
Dept: FAMILY MEDICINE CLINIC | Facility: CLINIC | Age: 79
End: 2019-04-03
Payer: MEDICARE

## 2019-04-03 VITALS
HEIGHT: 59 IN | RESPIRATION RATE: 16 BRPM | BODY MASS INDEX: 25.76 KG/M2 | SYSTOLIC BLOOD PRESSURE: 150 MMHG | DIASTOLIC BLOOD PRESSURE: 90 MMHG | TEMPERATURE: 96.9 F | HEART RATE: 78 BPM | WEIGHT: 127.8 LBS

## 2019-04-03 DIAGNOSIS — E78.5 HYPERLIPIDEMIA, UNSPECIFIED HYPERLIPIDEMIA TYPE: ICD-10-CM

## 2019-04-03 DIAGNOSIS — H61.21 IMPACTED CERUMEN OF RIGHT EAR: ICD-10-CM

## 2019-04-03 DIAGNOSIS — I10 ESSENTIAL HYPERTENSION: ICD-10-CM

## 2019-04-03 DIAGNOSIS — I48.0 PAROXYSMAL ATRIAL FIBRILLATION (HCC): ICD-10-CM

## 2019-04-03 DIAGNOSIS — R10.32 LEFT LOWER QUADRANT PAIN: Primary | ICD-10-CM

## 2019-04-03 DIAGNOSIS — E03.9 HYPOTHYROIDISM, UNSPECIFIED TYPE: ICD-10-CM

## 2019-04-03 LAB
ALBUMIN SERPL BCP-MCNC: 3.9 G/DL (ref 3.5–5)
ALP SERPL-CCNC: 97 U/L (ref 46–116)
ALT SERPL W P-5'-P-CCNC: 17 U/L (ref 12–78)
ANION GAP SERPL CALCULATED.3IONS-SCNC: 5 MMOL/L (ref 4–13)
AST SERPL W P-5'-P-CCNC: 12 U/L (ref 5–45)
BILIRUB SERPL-MCNC: 0.76 MG/DL (ref 0.2–1)
BUN SERPL-MCNC: 14 MG/DL (ref 5–25)
CALCIUM SERPL-MCNC: 9 MG/DL (ref 8.3–10.1)
CHLORIDE SERPL-SCNC: 104 MMOL/L (ref 100–108)
CHOLEST SERPL-MCNC: 105 MG/DL (ref 50–200)
CO2 SERPL-SCNC: 27 MMOL/L (ref 21–32)
CREAT SERPL-MCNC: 0.78 MG/DL (ref 0.6–1.3)
ERYTHROCYTE [DISTWIDTH] IN BLOOD BY AUTOMATED COUNT: 12.8 % (ref 11.6–15.1)
GFR SERPL CREATININE-BSD FRML MDRD: 73 ML/MIN/1.73SQ M
GLUCOSE P FAST SERPL-MCNC: 96 MG/DL (ref 65–99)
HCT VFR BLD AUTO: 43.9 % (ref 34.8–46.1)
HDLC SERPL-MCNC: 41 MG/DL (ref 40–60)
HGB BLD-MCNC: 14.4 G/DL (ref 11.5–15.4)
LDLC SERPL CALC-MCNC: 53 MG/DL (ref 0–100)
MCH RBC QN AUTO: 29.8 PG (ref 26.8–34.3)
MCHC RBC AUTO-ENTMCNC: 32.8 G/DL (ref 31.4–37.4)
MCV RBC AUTO: 91 FL (ref 82–98)
NONHDLC SERPL-MCNC: 64 MG/DL
PLATELET # BLD AUTO: 210 THOUSANDS/UL (ref 149–390)
PMV BLD AUTO: 11.2 FL (ref 8.9–12.7)
POTASSIUM SERPL-SCNC: 4.3 MMOL/L (ref 3.5–5.3)
PROT SERPL-MCNC: 7.4 G/DL (ref 6.4–8.2)
RBC # BLD AUTO: 4.84 MILLION/UL (ref 3.81–5.12)
SL AMB  POCT GLUCOSE, UA: NORMAL
SL AMB LEUKOCYTE ESTERASE,UA: 15
SL AMB POCT BILIRUBIN,UA: NORMAL
SL AMB POCT BLOOD,UA: NORMAL
SL AMB POCT CLARITY,UA: CLEAR
SL AMB POCT COLOR,UA: YELLOW
SL AMB POCT KETONES,UA: NORMAL
SL AMB POCT NITRITE,UA: NORMAL
SL AMB POCT PH,UA: 6.5
SL AMB POCT SPECIFIC GRAVITY,UA: 1.02
SL AMB POCT URINE PROTEIN: NORMAL
SL AMB POCT UROBILINOGEN: 0.2
SODIUM SERPL-SCNC: 136 MMOL/L (ref 136–145)
TRIGL SERPL-MCNC: 57 MG/DL
TSH SERPL DL<=0.05 MIU/L-ACNC: 2.43 UIU/ML (ref 0.36–3.74)
WBC # BLD AUTO: 9.27 THOUSAND/UL (ref 4.31–10.16)

## 2019-04-03 PROCEDURE — 84443 ASSAY THYROID STIM HORMONE: CPT

## 2019-04-03 PROCEDURE — 81002 URINALYSIS NONAUTO W/O SCOPE: CPT | Performed by: FAMILY MEDICINE

## 2019-04-03 PROCEDURE — 85027 COMPLETE CBC AUTOMATED: CPT

## 2019-04-03 PROCEDURE — 99214 OFFICE O/P EST MOD 30 MIN: CPT | Performed by: FAMILY MEDICINE

## 2019-04-03 PROCEDURE — 36415 COLL VENOUS BLD VENIPUNCTURE: CPT

## 2019-04-03 PROCEDURE — 87186 SC STD MICRODIL/AGAR DIL: CPT | Performed by: FAMILY MEDICINE

## 2019-04-03 PROCEDURE — G0439 PPPS, SUBSEQ VISIT: HCPCS | Performed by: FAMILY MEDICINE

## 2019-04-03 PROCEDURE — 87077 CULTURE AEROBIC IDENTIFY: CPT | Performed by: FAMILY MEDICINE

## 2019-04-03 PROCEDURE — 80061 LIPID PANEL: CPT

## 2019-04-03 PROCEDURE — 87086 URINE CULTURE/COLONY COUNT: CPT | Performed by: FAMILY MEDICINE

## 2019-04-03 PROCEDURE — 80053 COMPREHEN METABOLIC PANEL: CPT

## 2019-04-04 ENCOUNTER — TELEPHONE (OUTPATIENT)
Dept: FAMILY MEDICINE CLINIC | Facility: CLINIC | Age: 79
End: 2019-04-04

## 2019-04-05 ENCOUNTER — TELEPHONE (OUTPATIENT)
Dept: FAMILY MEDICINE CLINIC | Facility: CLINIC | Age: 79
End: 2019-04-05

## 2019-04-05 DIAGNOSIS — N39.0 URINARY TRACT INFECTION WITHOUT HEMATURIA, SITE UNSPECIFIED: Primary | ICD-10-CM

## 2019-04-05 LAB — BACTERIA UR CULT: ABNORMAL

## 2019-04-05 RX ORDER — CIPROFLOXACIN 500 MG/1
500 TABLET, FILM COATED ORAL EVERY 12 HOURS SCHEDULED
Qty: 10 TABLET | Refills: 0 | Status: SHIPPED | OUTPATIENT
Start: 2019-04-05 | End: 2019-04-10

## 2019-04-06 ENCOUNTER — TELEPHONE (OUTPATIENT)
Dept: FAMILY MEDICINE CLINIC | Facility: CLINIC | Age: 79
End: 2019-04-06

## 2019-04-10 ENCOUNTER — TELEPHONE (OUTPATIENT)
Dept: FAMILY MEDICINE CLINIC | Facility: CLINIC | Age: 79
End: 2019-04-10

## 2019-04-11 ENCOUNTER — TELEPHONE (OUTPATIENT)
Dept: FAMILY MEDICINE CLINIC | Facility: CLINIC | Age: 79
End: 2019-04-11

## 2019-04-11 ENCOUNTER — CLINICAL SUPPORT (OUTPATIENT)
Dept: FAMILY MEDICINE CLINIC | Facility: CLINIC | Age: 79
End: 2019-04-11
Payer: MEDICARE

## 2019-04-11 DIAGNOSIS — N39.0 URINARY TRACT INFECTION WITHOUT HEMATURIA, SITE UNSPECIFIED: Primary | ICD-10-CM

## 2019-04-11 LAB
SL AMB  POCT GLUCOSE, UA: NEGATIVE
SL AMB LEUKOCYTE ESTERASE,UA: NEGATIVE
SL AMB POCT BILIRUBIN,UA: NEGATIVE
SL AMB POCT BLOOD,UA: NEGATIVE
SL AMB POCT CLARITY,UA: CLEAR
SL AMB POCT COLOR,UA: YELLOW
SL AMB POCT KETONES,UA: NEGATIVE
SL AMB POCT NITRITE,UA: NEGATIVE
SL AMB POCT PH,UA: 5
SL AMB POCT SPECIFIC GRAVITY,UA: 1.03
SL AMB POCT URINE PROTEIN: NEGATIVE
SL AMB POCT UROBILINOGEN: 0.2

## 2019-04-11 PROCEDURE — 81002 URINALYSIS NONAUTO W/O SCOPE: CPT

## 2019-05-21 ENCOUNTER — OFFICE VISIT (OUTPATIENT)
Dept: CARDIOLOGY CLINIC | Facility: CLINIC | Age: 79
End: 2019-05-21
Payer: MEDICARE

## 2019-05-21 VITALS
DIASTOLIC BLOOD PRESSURE: 68 MMHG | WEIGHT: 128.8 LBS | SYSTOLIC BLOOD PRESSURE: 140 MMHG | HEART RATE: 56 BPM | HEIGHT: 59 IN | BODY MASS INDEX: 25.96 KG/M2

## 2019-05-21 DIAGNOSIS — I10 ESSENTIAL HYPERTENSION: ICD-10-CM

## 2019-05-21 DIAGNOSIS — I25.10 ARTERIOSCLEROSIS OF CORONARY ARTERY: ICD-10-CM

## 2019-05-21 DIAGNOSIS — E78.2 MIXED HYPERLIPIDEMIA: ICD-10-CM

## 2019-05-21 DIAGNOSIS — I48.0 PAROXYSMAL ATRIAL FIBRILLATION (HCC): Primary | ICD-10-CM

## 2019-05-21 PROCEDURE — 99214 OFFICE O/P EST MOD 30 MIN: CPT | Performed by: INTERNAL MEDICINE

## 2019-07-30 DIAGNOSIS — E87.6 HYPOKALEMIA: ICD-10-CM

## 2019-07-30 DIAGNOSIS — E03.9 HYPOTHYROIDISM, UNSPECIFIED TYPE: ICD-10-CM

## 2019-07-30 DIAGNOSIS — I48.0 PAROXYSMAL ATRIAL FIBRILLATION (HCC): ICD-10-CM

## 2019-07-30 RX ORDER — LEVOTHYROXINE SODIUM 0.03 MG/1
TABLET ORAL
Qty: 90 TABLET | Refills: 1 | Status: SHIPPED | OUTPATIENT
Start: 2019-07-30 | End: 2019-12-19 | Stop reason: SDUPTHER

## 2019-07-30 RX ORDER — POTASSIUM CHLORIDE 20 MEQ/1
TABLET, EXTENDED RELEASE ORAL
Qty: 180 TABLET | Refills: 1 | Status: SHIPPED | OUTPATIENT
Start: 2019-07-30 | End: 2019-12-19 | Stop reason: SDUPTHER

## 2019-07-30 RX ORDER — METOPROLOL TARTRATE 50 MG/1
TABLET, FILM COATED ORAL
Qty: 180 TABLET | Refills: 3 | Status: SHIPPED | OUTPATIENT
Start: 2019-07-30 | End: 2020-06-16

## 2019-08-13 DIAGNOSIS — I10 ESSENTIAL HYPERTENSION: ICD-10-CM

## 2019-08-13 RX ORDER — AMLODIPINE BESYLATE 10 MG/1
TABLET ORAL
Qty: 90 TABLET | Refills: 3 | OUTPATIENT
Start: 2019-08-13

## 2019-08-27 DIAGNOSIS — I10 ESSENTIAL HYPERTENSION: ICD-10-CM

## 2019-08-27 RX ORDER — AMLODIPINE BESYLATE 10 MG/1
TABLET ORAL
Qty: 90 TABLET | Refills: 3 | OUTPATIENT
Start: 2019-08-27

## 2019-09-04 DIAGNOSIS — I10 ESSENTIAL HYPERTENSION: ICD-10-CM

## 2019-09-04 RX ORDER — AMLODIPINE BESYLATE 10 MG/1
10 TABLET ORAL DAILY
Qty: 90 TABLET | Refills: 1 | Status: SHIPPED | OUTPATIENT
Start: 2019-09-04 | End: 2020-02-05

## 2019-10-02 ENCOUNTER — APPOINTMENT (OUTPATIENT)
Dept: LAB | Facility: CLINIC | Age: 79
End: 2019-10-02
Payer: MEDICARE

## 2019-10-02 ENCOUNTER — OFFICE VISIT (OUTPATIENT)
Dept: FAMILY MEDICINE CLINIC | Facility: CLINIC | Age: 79
End: 2019-10-02
Payer: MEDICARE

## 2019-10-02 VITALS
TEMPERATURE: 94.5 F | WEIGHT: 128.5 LBS | BODY MASS INDEX: 25.91 KG/M2 | OXYGEN SATURATION: 96 % | HEART RATE: 79 BPM | SYSTOLIC BLOOD PRESSURE: 140 MMHG | RESPIRATION RATE: 16 BRPM | HEIGHT: 59 IN | DIASTOLIC BLOOD PRESSURE: 64 MMHG

## 2019-10-02 DIAGNOSIS — I10 ESSENTIAL HYPERTENSION: ICD-10-CM

## 2019-10-02 DIAGNOSIS — E03.9 HYPOTHYROIDISM, UNSPECIFIED TYPE: ICD-10-CM

## 2019-10-02 DIAGNOSIS — E78.5 HYPERLIPIDEMIA, UNSPECIFIED HYPERLIPIDEMIA TYPE: ICD-10-CM

## 2019-10-02 DIAGNOSIS — I10 ESSENTIAL HYPERTENSION: Primary | ICD-10-CM

## 2019-10-02 DIAGNOSIS — H61.21 IMPACTED CERUMEN OF RIGHT EAR: ICD-10-CM

## 2019-10-02 LAB
ALBUMIN SERPL BCP-MCNC: 4.4 G/DL (ref 3.5–5)
ALP SERPL-CCNC: 87 U/L (ref 46–116)
ALT SERPL W P-5'-P-CCNC: 20 U/L (ref 12–78)
ANION GAP SERPL CALCULATED.3IONS-SCNC: 7 MMOL/L (ref 4–13)
AST SERPL W P-5'-P-CCNC: 11 U/L (ref 5–45)
BILIRUB SERPL-MCNC: 0.68 MG/DL (ref 0.2–1)
BUN SERPL-MCNC: 12 MG/DL (ref 5–25)
CALCIUM SERPL-MCNC: 9.6 MG/DL (ref 8.3–10.1)
CHLORIDE SERPL-SCNC: 103 MMOL/L (ref 100–108)
CHOLEST SERPL-MCNC: 106 MG/DL (ref 50–200)
CO2 SERPL-SCNC: 28 MMOL/L (ref 21–32)
CREAT SERPL-MCNC: 0.72 MG/DL (ref 0.6–1.3)
ERYTHROCYTE [DISTWIDTH] IN BLOOD BY AUTOMATED COUNT: 12.8 % (ref 11.6–15.1)
GFR SERPL CREATININE-BSD FRML MDRD: 80 ML/MIN/1.73SQ M
GLUCOSE P FAST SERPL-MCNC: 98 MG/DL (ref 65–99)
HCT VFR BLD AUTO: 43.9 % (ref 34.8–46.1)
HDLC SERPL-MCNC: 42 MG/DL (ref 40–60)
HGB BLD-MCNC: 14.3 G/DL (ref 11.5–15.4)
LDLC SERPL CALC-MCNC: 49 MG/DL (ref 0–100)
MCH RBC QN AUTO: 29.4 PG (ref 26.8–34.3)
MCHC RBC AUTO-ENTMCNC: 32.6 G/DL (ref 31.4–37.4)
MCV RBC AUTO: 90 FL (ref 82–98)
NONHDLC SERPL-MCNC: 64 MG/DL
PLATELET # BLD AUTO: 243 THOUSANDS/UL (ref 149–390)
PMV BLD AUTO: 10.2 FL (ref 8.9–12.7)
POTASSIUM SERPL-SCNC: 4.3 MMOL/L (ref 3.5–5.3)
PROT SERPL-MCNC: 7.5 G/DL (ref 6.4–8.2)
RBC # BLD AUTO: 4.86 MILLION/UL (ref 3.81–5.12)
SODIUM SERPL-SCNC: 138 MMOL/L (ref 136–145)
TRIGL SERPL-MCNC: 73 MG/DL
TSH SERPL DL<=0.05 MIU/L-ACNC: 1.57 UIU/ML (ref 0.36–3.74)
WBC # BLD AUTO: 8.34 THOUSAND/UL (ref 4.31–10.16)

## 2019-10-02 PROCEDURE — 85027 COMPLETE CBC AUTOMATED: CPT

## 2019-10-02 PROCEDURE — 36415 COLL VENOUS BLD VENIPUNCTURE: CPT

## 2019-10-02 PROCEDURE — 80053 COMPREHEN METABOLIC PANEL: CPT

## 2019-10-02 PROCEDURE — 99214 OFFICE O/P EST MOD 30 MIN: CPT | Performed by: FAMILY MEDICINE

## 2019-10-02 PROCEDURE — 84443 ASSAY THYROID STIM HORMONE: CPT

## 2019-10-02 PROCEDURE — 80061 LIPID PANEL: CPT

## 2019-10-02 PROCEDURE — 69210 REMOVE IMPACTED EAR WAX UNI: CPT | Performed by: FAMILY MEDICINE

## 2019-10-02 NOTE — ASSESSMENT & PLAN NOTE
Cerumen impaction  Wax was removed from right ear canal   Tympanic membrane within normal limits    Patient will call if symptoms recur

## 2019-10-02 NOTE — PROGRESS NOTES
FAMILY PRACTICE OFFICE VISIT       NAME: Michelle Boateng  AGE: 66 y o  SEX: female       : 1940        MRN: [id number]    DATE: 10/2/2019  TIME: 8:56 AM    Assessment and Plan     Problem List Items Addressed This Visit        Endocrine    Hypothyroidism     Hypothyroidism  Patient will check TSH blood work and continue with current dose of levothyroxine         Relevant Orders    CBC    Comprehensive metabolic panel    Lipid panel    TSH, 3rd generation       Cardiovascular and Mediastinum    Hypertension - Primary     Hypertension  The patient blood pressure is stable and she will continue with current dose of amlodipine, hyzaar, and metoprolol  Relevant Orders    CBC    Comprehensive metabolic panel    Lipid panel    TSH, 3rd generation       Nervous and Auditory    Impacted cerumen of right ear     Cerumen impaction  Wax was removed from right ear canal   Tympanic membrane within normal limits  Patient will call if symptoms recur            Other    Hyperlipidemia     Hyperlipidemia  Patient will check lipid panel blood work and continue with current dose of statin therapy         Relevant Orders    CBC    Comprehensive metabolic panel    Lipid panel    TSH, 3rd generation              Chief Complaint     Chief Complaint   Patient presents with    Follow-up     6 month ,        History of Present Illness     Patient denies any recent illness  Her biggest complaint is of bilateral decrease hearing  She denies any pain in her ears  Patient refuses recommended vaccinations of flu, pneumonia, or shingles vaccines      Review of Systems   Review of Systems   Constitutional: Negative  HENT:        As per HPI   Respiratory: Negative  Cardiovascular: Negative  Gastrointestinal: Negative  Genitourinary: Negative  Musculoskeletal: Negative  Neurological: Negative  Psychiatric/Behavioral: Negative          Active Problem List     Patient Active Problem List   Diagnosis    Arteriosclerosis of coronary artery    Hyperlipidemia    Hypertension    Esophageal reflux    Generalized anxiety disorder    Hypothyroidism    Well adult exam    Paroxysmal atrial fibrillation (HCC)    Impacted cerumen of right ear    Left lower quadrant pain       Past Medical History:  Past Medical History:   Diagnosis Date    Coronary artery disease        Past Surgical History:  Past Surgical History:   Procedure Laterality Date    CARDIAC CATHETERIZATION      post cath with PCI to the proximal LAD with a xience stent 2013    CORONARY ANGIOPLASTY WITH STENT PLACEMENT         Family History:  Family History   Problem Relation Age of Onset    Heart attack Mother         prior MI,  at the age of 80   Weir Coronary artery disease Father     Hypertension Father     Cancer Sister     Coronary artery disease Sister     Diabetes Sister     Hyperlipidemia Sister     Diabetes Brother        Social History:  Social History     Socioeconomic History    Marital status: Single     Spouse name: Not on file    Number of children: Not on file    Years of education: Not on file    Highest education level: Not on file   Occupational History    Not on file   Social Needs    Financial resource strain: Not on file    Food insecurity:     Worry: Not on file     Inability: Not on file    Transportation needs:     Medical: Not on file     Non-medical: Not on file   Tobacco Use    Smoking status: Never Smoker    Smokeless tobacco: Never Used   Substance and Sexual Activity    Alcohol use: Not Currently    Drug use: No    Sexual activity: Not on file   Lifestyle    Physical activity:     Days per week: Not on file     Minutes per session: Not on file    Stress: Not on file   Relationships    Social connections:     Talks on phone: Not on file     Gets together: Not on file     Attends Orthodoxy service: Not on file     Active member of club or organization: Not on file     Attends meetings of clubs or organizations: Not on file     Relationship status: Not on file    Intimate partner violence:     Fear of current or ex partner: Not on file     Emotionally abused: Not on file     Physically abused: Not on file     Forced sexual activity: Not on file   Other Topics Concern    Not on file   Social History Narrative    Daily coffee consumption (4 cups/day)       Objective     Vitals:    10/02/19 0743   BP: 140/64   Pulse: 79   Resp: 16   Temp: (!) 94 5 °F (34 7 °C)   SpO2: 96%     Wt Readings from Last 3 Encounters:   10/02/19 58 3 kg (128 lb 8 oz)   05/21/19 58 4 kg (128 lb 12 8 oz)   04/03/19 58 kg (127 lb 12 8 oz)         Physical Exam   Constitutional: She is oriented to person, place, and time  No distress  HENT:   Right Ear: External ear normal    Left Ear: External ear normal    Mouth/Throat: Oropharynx is clear and moist  No oropharyngeal exudate  Right-sided cerumen impaction of ear canal   Left tympanic membrane within normal limits   Neck:   No carotid bruits   Cardiovascular: Normal rate, regular rhythm and normal heart sounds  No murmur heard  Pulmonary/Chest: Effort normal and breath sounds normal  She has no wheezes  She has no rales  Abdominal: Soft  Bowel sounds are normal  She exhibits no distension and no mass  There is no tenderness  There is no rebound and no guarding  Musculoskeletal: She exhibits no edema  Lymphadenopathy:     She has no cervical adenopathy  Neurological: She is alert and oriented to person, place, and time  Psychiatric: She has a normal mood and affect   Her behavior is normal  Judgment and thought content normal      Ear cerumen removal  Date/Time: 10/2/2019 8:53 AM  Performed by: Danette Kline MD  Authorized by: Danette Kline MD     Patient location:  Clinic  Consent:     Consent obtained:  Verbal    Consent given by:  Patient  Procedure details:     Local anesthetic:  None    Location:  R ear    Procedure type: curette      Procedure type comment: Irrigation with peroxide and water    Approach:  Natural orifice  Post-procedure details:     Complication:  None    Hearing quality:  Improved    Patient tolerance of procedure:   Tolerated well, no immediate complications  Comments:      Large amount of wax was removed from right ear canal   Tympanic membrane within normal limits      Pertinent Laboratory/Diagnostic Studies:  Lab Results   Component Value Date    GLUCOSE 97 10/26/2015    BUN 14 04/03/2019    CREATININE 0 78 04/03/2019    CALCIUM 9 0 04/03/2019     10/26/2015    K 4 3 04/03/2019    CO2 27 04/03/2019     04/03/2019     Lab Results   Component Value Date    ALT 17 04/03/2019    AST 12 04/03/2019    ALKPHOS 97 04/03/2019    BILITOT 0 93 10/26/2015       Lab Results   Component Value Date    WBC 9 27 04/03/2019    HGB 14 4 04/03/2019    HCT 43 9 04/03/2019    MCV 91 04/03/2019     04/03/2019       No results found for: TSH    Lab Results   Component Value Date    CHOL 99 10/26/2015     Lab Results   Component Value Date    TRIG 57 04/03/2019     Lab Results   Component Value Date    HDL 41 04/03/2019     Lab Results   Component Value Date    LDLCALC 53 04/03/2019     No results found for: HGBA1C    Results for orders placed or performed in visit on 04/11/19   POCT urine dip   Result Value Ref Range    LEUKOCYTE ESTERASE,UA negative     NITRITE,UA negative     SL AMB POCT UROBILINOGEN 0 2     POCT URINE PROTEIN negative      PH,UA 5 0     BLOOD,UA negative     SPECIFIC GRAVITY,UA 1 030     KETONES,UA negative     BILIRUBIN,UA negative     GLUCOSE, UA negative      COLOR,UA yellow     CLARITY,UA clear        Orders Placed This Encounter   Procedures    Ear cerumen removal    CBC    Comprehensive metabolic panel    Lipid panel    TSH, 3rd generation       ALLERGIES:  No Known Allergies    Current Medications     Current Outpatient Medications   Medication Sig Dispense Refill    amLODIPine (NORVASC) 10 mg tablet Take 1 tablet (10 mg total) by mouth daily 90 tablet 1    aspirin (ECOTRIN LOW STRENGTH) 81 mg EC tablet Take 81 mg by mouth daily      atorvastatin (LIPITOR) 40 mg tablet TAKE 1 TABLET EVERY DAY AT BEDTIME 90 tablet 3    levothyroxine 25 mcg tablet TAKE 1 TABLET EVERY DAY 90 tablet 1    losartan-hydrochlorothiazide (HYZAAR) 100-25 MG per tablet TAKE 1 TABLET EVERY DAY 90 tablet 3    metoprolol tartrate (LOPRESSOR) 50 mg tablet TAKE 1 TABLET TWICE DAILY 180 tablet 3    Multiple Vitamins-Minerals (PRESERVISION AREDS PO) Take by mouth daily      potassium chloride (K-DUR,KLOR-CON) 20 mEq tablet TAKE 1 TABLET TWICE DAILY 180 tablet 1    LORazepam (ATIVAN) 0 5 mg tablet Take 1 tablet (0 5 mg total) by mouth 2 (two) times a day as needed for anxiety (Patient not taking: Reported on 10/2/2019) 40 tablet 0     No current facility-administered medications for this visit  Health Maintenance     Health Maintenance   Topic Date Due    SLP PLAN OF CARE  1940    BMI: Followup Plan  12/26/1958    Pneumococcal Vaccine: 65+ Years (1 of 2 - PCV13) 12/26/2005    DTaP,Tdap,and Td Vaccines (1 - Tdap) 04/01/2020 (Originally 12/26/1961)    INFLUENZA VACCINE  04/03/2020 (Originally 7/1/2019)    Fall Risk  04/03/2020    Depression Screening PHQ  04/03/2020    Urinary Incontinence Screening  04/03/2020    Medicare Annual Wellness Visit (AWV)  04/03/2020    BMI: Adult  10/02/2020    Pneumococcal Vaccine: Pediatrics (0 to 5 Years) and At-Risk Patients (6 to 59 Years)  Aged Out    HEPATITIS B VACCINES  Aged Out       There is no immunization history on file for this patient      Hugo Mandujano MD    I spent 25 minutes with this patient of which greater than 50% was spent counseling

## 2019-10-02 NOTE — ASSESSMENT & PLAN NOTE
Hypertension  The patient blood pressure is stable and she will continue with current dose of amlodipine, hyzaar, and metoprolol

## 2019-10-04 ENCOUNTER — TELEPHONE (OUTPATIENT)
Dept: FAMILY MEDICINE CLINIC | Facility: CLINIC | Age: 79
End: 2019-10-04

## 2019-10-04 NOTE — TELEPHONE ENCOUNTER
Called pt's home #- no answer, no VM available  Spoke with Nain Arnold (DESTINY-I-L)  Made aware as per Md's orders

## 2019-10-04 NOTE — TELEPHONE ENCOUNTER
----- Message from Ann-Marie Adams MD sent at 30/3/7422 12:45 PM EDT -----  All recent blood work stable for patient

## 2019-10-31 ENCOUNTER — OFFICE VISIT (OUTPATIENT)
Dept: CARDIOLOGY CLINIC | Facility: CLINIC | Age: 79
End: 2019-10-31
Payer: MEDICARE

## 2019-10-31 VITALS
HEART RATE: 61 BPM | DIASTOLIC BLOOD PRESSURE: 86 MMHG | WEIGHT: 129 LBS | SYSTOLIC BLOOD PRESSURE: 128 MMHG | HEIGHT: 59 IN | BODY MASS INDEX: 26 KG/M2

## 2019-10-31 DIAGNOSIS — I10 ESSENTIAL HYPERTENSION: ICD-10-CM

## 2019-10-31 DIAGNOSIS — I25.10 ARTERIOSCLEROSIS OF CORONARY ARTERY: ICD-10-CM

## 2019-10-31 DIAGNOSIS — I48.0 PAROXYSMAL ATRIAL FIBRILLATION (HCC): Primary | ICD-10-CM

## 2019-10-31 DIAGNOSIS — E78.2 MIXED HYPERLIPIDEMIA: ICD-10-CM

## 2019-10-31 PROCEDURE — 93000 ELECTROCARDIOGRAM COMPLETE: CPT | Performed by: INTERNAL MEDICINE

## 2019-10-31 PROCEDURE — 99214 OFFICE O/P EST MOD 30 MIN: CPT | Performed by: INTERNAL MEDICINE

## 2019-10-31 NOTE — PATIENT INSTRUCTIONS
Coronary Artery Disease   AMBULATORY CARE:   Coronary artery disease (CAD)  is narrowing of the arteries to your heart caused by a buildup of plaque  Plaque is made up of cholesterol and other substances  The narrowing in your arteries decreases the amount of blood that can flow to your heart  This causes your heart to get less oxygen  You may not have any symptoms of CAD  It is important for you to manage CAD even if you feel well  CAD can lead to a heart attack if it is not managed  Common symptoms include the following:   · Chest pain (angina), causing burning, squeezing, or crushing tightness in your chest    · Pain that spreads to your neck, jaw, or shoulder blade    · Nausea, vomiting, sweating, fainting, and hands and feet that are cold to the touch  Call 911 for any of the following:   · You have any of the following signs of a heart attack:      ¨ Squeezing, pressure, or pain in your chest that lasts longer than 5 minutes or returns    ¨ Discomfort or pain in your back, neck, jaw, stomach, or arm     ¨ Trouble breathing    ¨ Nausea or vomiting    ¨ Lightheadedness or a sudden cold sweat, especially with chest pain or trouble breathing    Contact your healthcare provider if:   · You have chest pain that is more frequent, or you have chest pain at rest     · You have questions or concerns about your condition or care  Medicines used to treat CAD:   · Blood pressure medicines  are given to lower your blood pressure  ACE inhibitors help keep your blood vessels relaxed and open to help keep blood flowing into your heart  Beta-blockers keep your heart pumping strongly and regularly so it does not work too hard to get oxygen  · Cholesterol medicines  help lower blood cholesterol levels  · Nitrates , such as nitroglycerin, relax the arteries of your heart so it gets more oxygen  They help to relieve your chest pain  · Antiplatelets , such as aspirin, help prevent blood clots   Take your antiplatelet medicine exactly as directed  These medicines make it more likely for you to bleed or bruise  If you are told to take aspirin, do not take acetaminophen or ibuprofen instead  · Blood thinners  keep clots from forming in your blood  Clots may cause heart attacks, strokes, or death  This medicine makes it more likely for you to bleed or bruise  · Do not take certain medicines without asking your healthcare provider first   These include NSAIDs, herbal or vitamin supplements, or hormones (estrogen or progestin)  Procedures used to treat CAD:   · Angioplasty  may be done to open an artery blocked by plaque  A tube with a balloon on the end is threaded into the blocked artery  Once the tube is in the artery, the balloon is inflated  As the balloon inflates, it presses the plaque against the artery wall to open the artery  A stent may be placed in your artery to keep it open  · Coronary artery bypass graft surgery (CABG)  is open heart surgery  Healthcare providers take arteries or veins from other areas in your body and use them to bypass or go around the blocked arteries of your heart  Cardiac rehabilitation:  Your healthcare provider may recommend that you attend cardiac rehabilitation (rehab)  This is a program run by specialists who will help you safely strengthen your heart and reduce the risk for more heart disease  The plan includes exercise, relaxation, stress management, and heart-healthy nutrition  Healthcare providers will also check to make sure any medicines you are taking are working  Manage CAD to prevent a heart attack:   · Do not smoke  Nicotine and other chemicals in cigarettes and cigars can cause heart and lung damage  Ask your healthcare provider for information if you currently smoke and need help to quit  E-cigarettes or smokeless tobacco still contain nicotine  Talk to your healthcare provider before you use these products  · Exercise regularly    Exercise at least 30 minutes each day, on most days of the week  Exercise helps to lower high cholesterol and high blood pressure  It can also help you maintain a healthy weight  Ask your healthcare provider about the kind of exercise you should do and how to get started  · Maintain a healthy weight  If you are overweight, talk to your healthcare provider about how to lose weight  A weight loss of 10% can improve your heart health  · Eat heart-healthy foods  Include fresh fruits and vegetables in your meal plan  Choose low-fat foods, such as skim or 1% fat milk, low-fat cheese and yogurt, fish, chicken (without skin), and lean meats  Eat two 4-ounce servings of fish high in omega-3 fats each week, such as salmon, fresh tuna, and herring  Do not eat foods that are high in sodium, such as canned foods, potato chips, salty snacks, and cold cuts  Put less table salt on your food  · Limit or do not drink alcohol  A drink of alcohol is 12 ounces of beer, 5 ounces of wine, or 1½ ounces of liquor  · Manage other health conditions  Follow your healthcare provider's advice on how to manage other conditions that can affect your heart health  These include diabetes, high blood pressure, and high cholesterol  You may need to take medicines for these conditions and make other lifestyle changes  · Ask if you should have a flu vaccine  The flu can be dangerous for a person who has CAD  The flu vaccine is available every year in the fall  Follow up with your healthcare provider as directed: You may need to return for other tests  You may also be referred to a cardiac surgeon  Write down your questions so you remember to ask them during your visits  © 2017 [employer]  Information is for End User's use only and may not be sold, redistributed or otherwise used for commercial purposes  All illustrations and images included in CareNotes® are the copyrighted property of [employer]  or [employer]    The above information is an  only  It is not intended as medical advice for individual conditions or treatments  Talk to your doctor, nurse or pharmacist before following any medical regimen to see if it is safe and effective for you

## 2019-10-31 NOTE — PROGRESS NOTES
Cardiology Follow Up    Leonela Carlos  1940  [id number]     [facility]ART [facility]    1  Paroxysmal atrial fibrillation (HCC)  POCT ECG   2  Arteriosclerosis of coronary artery     3  Essential hypertension     4  Mixed hyperlipidemia       Chief Complaint   Patient presents with    Follow-up     No Cardiac Complaints       Interval History: Patient feels well, without complaints  No reported chest pain, shortness of breath, palpitations, lightheadedness, syncope, LE edema, orthopnea, PND, or significant weight changes  Patient remains active without any increased fatigue out of the ordinary          Patient Active Problem List   Diagnosis    Arteriosclerosis of coronary artery    Hyperlipidemia    Hypertension    Esophageal reflux    Generalized anxiety disorder    Hypothyroidism    Well adult exam    Paroxysmal atrial fibrillation ([address] )    Impacted cerumen of right ear    Left lower quadrant pain     Past Medical History:   Diagnosis Date    Coronary artery disease      Social History     Socioeconomic History    Marital status: Single     Spouse name: Not on file    Number of children: Not on file    Years of education: Not on file    Highest education level: Not on file   Occupational History    Not on file   Social Needs    Financial resource strain: Not on file    Food insecurity:     Worry: Not on file     Inability: Not on file    Transportation needs:     Medical: Not on file     Non-medical: Not on file   Tobacco Use    Smoking status: Never Smoker    Smokeless tobacco: Never Used   Substance and Sexual Activity    Alcohol use: Not Currently    Drug use: No    Sexual activity: Not on file   Lifestyle    Physical activity:     Days per week: Not on file     Minutes per session: Not on file    Stress: Not on file   Relationships    Social connections: Talks on phone: Not on file     Gets together: Not on file     Attends Orthodoxy service: Not on file     Active member of club or organization: Not on file     Attends meetings of clubs or organizations: Not on file     Relationship status: Not on file    Intimate partner violence:     Fear of current or ex partner: Not on file     Emotionally abused: Not on file     Physically abused: Not on file     Forced sexual activity: Not on file   Other Topics Concern    Not on file   Social History Narrative    Daily coffee consumption (4 cups/day)      Family History   Problem Relation Age of Onset    Heart attack Mother         prior MI,  at the age of 80   Donna Quiles Coronary artery disease Father     Hypertension Father     Cancer Sister     Coronary artery disease Sister     Diabetes Sister     Hyperlipidemia Sister     Diabetes Brother      Past Surgical History:   Procedure Laterality Date    CARDIAC CATHETERIZATION      post cath with PCI to the proximal LAD with a xience stent 2013    CORONARY ANGIOPLASTY WITH STENT PLACEMENT         Current Outpatient Medications:     amLODIPine (NORVASC) 10 mg tablet, Take 1 tablet (10 mg total) by mouth daily, Disp: 90 tablet, Rfl: 1    aspirin (ECOTRIN LOW STRENGTH) 81 mg EC tablet, Take 81 mg by mouth daily, Disp: , Rfl:     atorvastatin (LIPITOR) 40 mg tablet, TAKE 1 TABLET EVERY DAY AT BEDTIME, Disp: 90 tablet, Rfl: 3    levothyroxine 25 mcg tablet, TAKE 1 TABLET EVERY DAY, Disp: 90 tablet, Rfl: 1    losartan-hydrochlorothiazide (HYZAAR) 100-25 MG per tablet, TAKE 1 TABLET EVERY DAY, Disp: 90 tablet, Rfl: 3    metoprolol tartrate (LOPRESSOR) 50 mg tablet, TAKE 1 TABLET TWICE DAILY, Disp: 180 tablet, Rfl: 3    Multiple Vitamins-Minerals (PRESERVISION AREDS PO), Take by mouth daily, Disp: , Rfl:     potassium chloride (K-DUR,KLOR-CON) 20 mEq tablet, TAKE 1 TABLET TWICE DAILY, Disp: 180 tablet, Rfl: 1    LORazepam (ATIVAN) 0 5 mg tablet, Take 1 tablet (0 5 mg total) by mouth 2 (two) times a day as needed for anxiety (Patient not taking: Reported on 10/2/2019), Disp: 40 tablet, Rfl: 0  No Known Allergies    Labs:  Appointment on 10/02/2019   Component Date Value    WBC 10/02/2019 8 34     RBC 10/02/2019 4 86     Hemoglobin 10/02/2019 14 3     Hematocrit 10/02/2019 43 9     MCV 10/02/2019 90     MCH 10/02/2019 29 4     MCHC 10/02/2019 32 6     RDW 10/02/2019 12 8     Platelets 60/28/9478 243     MPV 10/02/2019 10 2     Sodium 10/02/2019 138     Potassium 10/02/2019 4 3     Chloride 10/02/2019 103     CO2 10/02/2019 28     ANION GAP 10/02/2019 7     BUN 10/02/2019 12     Creatinine 10/02/2019 0 72     Glucose, Fasting 10/02/2019 98     Calcium 10/02/2019 9 6     AST 10/02/2019 11     ALT 10/02/2019 20     Alkaline Phosphatase 10/02/2019 87     Total Protein 10/02/2019 7 5     Albumin 10/02/2019 4 4     Total Bilirubin 10/02/2019 0 68     eGFR 10/02/2019 80     Cholesterol 10/02/2019 106     Triglycerides 10/02/2019 73     HDL, Direct 10/02/2019 42     LDL Calculated 10/02/2019 49     Non-HDL-Chol (CHOL-HDL) 10/02/2019 64     TSH 3RD GENERATON 10/02/2019 1 570      Lab Results   Component Value Date    CHOL 99 10/26/2015    TRIG 73 10/02/2019    TRIG 64 10/26/2015    HDL 42 10/02/2019    HDL 40 10/26/2015     Imaging: No results found  Review of Systems:  Review of Systems   Constitutional: Negative for activity change, appetite change, chills, diaphoresis, fatigue and unexpected weight change  HENT: Negative for hearing loss, nosebleeds and sore throat  Eyes: Negative for photophobia and visual disturbance  Respiratory: Negative for cough, chest tightness, shortness of breath and wheezing  Cardiovascular: Negative for chest pain, palpitations and leg swelling  Gastrointestinal: Negative for abdominal pain, diarrhea, nausea and vomiting  Endocrine: Negative for polyuria     Genitourinary: Negative for dysuria, frequency and hematuria  Musculoskeletal: Negative for arthralgias, back pain, gait problem and neck pain  Skin: Negative for pallor and rash  Neurological: Negative for dizziness, syncope and headaches  Hematological: Does not bruise/bleed easily  Psychiatric/Behavioral: Negative for behavioral problems and confusion  Physical Exam:  Physical Exam   Constitutional: She is oriented to person, place, and time  She appears well-developed and well-nourished  HENT:   Head: Normocephalic and atraumatic  Nose: Nose normal    Eyes: Pupils are equal, round, and reactive to light  EOM are normal  No scleral icterus  Neck: Normal range of motion  Neck supple  No JVD present  Cardiovascular: Normal rate, regular rhythm and normal heart sounds  Exam reveals no gallop and no friction rub  No murmur heard  Pulmonary/Chest: Effort normal and breath sounds normal  No respiratory distress  She has no wheezes  She has no rales  Abdominal: Soft  Bowel sounds are normal  She exhibits no distension  There is no tenderness  Musculoskeletal: Normal range of motion  She exhibits no edema or deformity  Neurological: She is alert and oriented to person, place, and time  No cranial nerve deficit  Skin: Skin is warm and dry  No rash noted  She is not diaphoretic  Psychiatric: She has a normal mood and affect  Her behavior is normal    Vitals reviewed  Blood pressure 128/86, pulse 61, height 4' 11" (1 499 m), weight 58 5 kg (129 lb)  EKG:  Sinus bradycardia  Septal infarct, age undetermined  Possible lateral infarct, age undetermined  Abnormal ECG    Discussion/Summary:  1) CAD: s/p Xience stent Jan 2013, feels ok, compliant with medications  Heart healthy diet with increased fresh fruit and vegetables, lean proteins (chicken, fish, beans), whole grains (brown rice, whole wheat bread, whole wheat pasta), and reduce sugary desserts  Off Effient since it has been over 1 year of therapy since her PATRICK   Continue with baby ASA daily     2) HTN: BP better controlled and at goal, cough improved after lisinopril was stopped  Continue norvasc at 10mg daily along with losartan/HCTZ and metoprolol     3) Hyperlipidemia: LDL at goal, 39 in Oct 2018  Continue statin    LDL 49 in Oct 2019 - at goal

## 2019-12-19 DIAGNOSIS — E87.6 HYPOKALEMIA: ICD-10-CM

## 2019-12-19 DIAGNOSIS — E03.9 HYPOTHYROIDISM, UNSPECIFIED TYPE: ICD-10-CM

## 2019-12-20 RX ORDER — LEVOTHYROXINE SODIUM 0.03 MG/1
TABLET ORAL
Qty: 90 TABLET | Refills: 1 | Status: SHIPPED | OUTPATIENT
Start: 2019-12-20 | End: 2020-04-10

## 2019-12-20 RX ORDER — POTASSIUM CHLORIDE 20 MEQ/1
TABLET, EXTENDED RELEASE ORAL
Qty: 180 TABLET | Refills: 1 | Status: SHIPPED | OUTPATIENT
Start: 2019-12-20 | End: 2020-04-10

## 2020-01-24 ENCOUNTER — TELEPHONE (OUTPATIENT)
Dept: OTHER | Facility: OTHER | Age: 80
End: 2020-01-24

## 2020-01-24 ENCOUNTER — OFFICE VISIT (OUTPATIENT)
Dept: FAMILY MEDICINE CLINIC | Facility: CLINIC | Age: 80
End: 2020-01-24
Payer: MEDICARE

## 2020-01-24 VITALS
HEART RATE: 71 BPM | TEMPERATURE: 97.9 F | WEIGHT: 129.38 LBS | BODY MASS INDEX: 26.08 KG/M2 | DIASTOLIC BLOOD PRESSURE: 90 MMHG | RESPIRATION RATE: 18 BRPM | OXYGEN SATURATION: 98 % | HEIGHT: 59 IN | SYSTOLIC BLOOD PRESSURE: 164 MMHG

## 2020-01-24 DIAGNOSIS — R82.90 ABNORMAL FINDING IN URINE: Primary | ICD-10-CM

## 2020-01-24 DIAGNOSIS — R05.9 COUGH: ICD-10-CM

## 2020-01-24 LAB
SL AMB  POCT GLUCOSE, UA: ABNORMAL
SL AMB LEUKOCYTE ESTERASE,UA: ABNORMAL
SL AMB POCT BILIRUBIN,UA: ABNORMAL
SL AMB POCT BLOOD,UA: ABNORMAL
SL AMB POCT CLARITY,UA: CLEAR
SL AMB POCT COLOR,UA: YELLOW
SL AMB POCT KETONES,UA: ABNORMAL
SL AMB POCT NITRITE,UA: ABNORMAL
SL AMB POCT PH,UA: 5
SL AMB POCT SPECIFIC GRAVITY,UA: 1.01
SL AMB POCT URINE PROTEIN: ABNORMAL
SL AMB POCT UROBILINOGEN: 0.2

## 2020-01-24 PROCEDURE — 81003 URINALYSIS AUTO W/O SCOPE: CPT | Performed by: NURSE PRACTITIONER

## 2020-01-24 PROCEDURE — 99213 OFFICE O/P EST LOW 20 MIN: CPT | Performed by: NURSE PRACTITIONER

## 2020-01-24 NOTE — TELEPHONE ENCOUNTER
Pt states that she has an UTI and is now coming down with cold symptoms and she would like to be seen today  Please call pt for an OV

## 2020-01-24 NOTE — PROGRESS NOTES
FAMILY PRACTICE OFFICE VISIT       NAME: Rafael Boateng  AGE: 78 y o  SEX: female       : 1940        MRN: [id number]    Assessment and Plan     Problem List Items Addressed This Visit     None      Visit Diagnoses     Abnormal finding in urine    -  Primary    Relevant Orders    POCT urine dip auto non-scope (Completed)    Urine culture    Urinalysis with microscopic    Cough              1  Abnormal finding in urine  This 66-year-old female presents today for a possible urinary tract infection  Yesterday started having an unusual sensation with voiding  Is not burning or pain  No other associated symptoms  In office urine dip is clear aside from trace blood  Will send urinalysis and culture to the lab  Will hold off on antibiotics at this time  Office will call with results of urinalysis and urine culture when available  She has been advised to call the office for any worsening of pain, or development of new symptoms in the interim  Reviewed signs and symptoms of urinary tract infection and pyelonephritis  POCT urine dip auto non-scope    Urine culture    Urinalysis with microscopic   2  Cough  Cough started 2 days ago  Suspect cough is secondary to postnasal drip  Advised patient to use over-the-counter Claritin or Zyrtec 10 mg daily as needed  She is being followed by ophthalmology for glaucoma, so will hold off on nasal corticosteroid at this time  If symptoms should worsen or persist, she will call  BMI Counseling: Body mass index is 26 13 kg/m²  The BMI is above normal  Nutrition recommendations include 3-5 servings of fruits/vegetables daily, moderation in carbohydrate intake and increasing intake of lean protein  Exercise recommendations include exercising 3-5 times per week        Chief Complaint     Chief Complaint   Patient presents with    Possible UTI      no sx ,        History of Present Illness     Jenelle Young is a 66-year-old female presenting today for possible urinary tract infection  Yesterday it felt strange when she was voiding  She had a urinary tract infection months ago, and concerned she may have 1 again  Denies burning with urination  No increase in frequency, no urgency  Denies back pain, nausea, vomiting, fevers or chills  Admits she usually does not drink enough fluids  She has been trying to increase her fluid intake since yesterday  Denies hematuria  Also started with a cough 2 days ago  Cough is worse in the morning, and she feels like there is mucus in the back of her throat  Denies sore throat  Has ongoing rhinorrhea  Cough is dry and nonproductive  Denies fevers or chills  Does not feel ill  Review of Systems   Review of Systems   Constitutional: Negative for chills, diaphoresis, fatigue and fever  HENT: Positive for rhinorrhea  Negative for congestion, ear pain, sneezing and sore throat  Respiratory: Positive for cough  Negative for chest tightness, shortness of breath and wheezing  Cardiovascular: Negative  Gastrointestinal: Negative  Genitourinary: Negative for difficulty urinating, dysuria, flank pain, frequency, hematuria, pelvic pain and urgency          As noted in HPI       Active Problem List     Patient Active Problem List   Diagnosis    Arteriosclerosis of coronary artery    Hyperlipidemia    Hypertension    Esophageal reflux    Generalized anxiety disorder    Hypothyroidism    Well adult exam    Paroxysmal atrial fibrillation ([address] )    Impacted cerumen of right ear    Left lower quadrant pain       Past Medical History:  Past Medical History:   Diagnosis Date    Coronary artery disease        Past Surgical History:  Past Surgical History:   Procedure Laterality Date    CARDIAC CATHETERIZATION      post cath with PCI to the proximal LAD with a xience stent jan 2013    CORONARY ANGIOPLASTY WITH STENT PLACEMENT         Family History:  Family History   Problem Relation Age of Onset  Heart attack Mother         prior MI,  at the age of 80    Coronary artery disease Father     Hypertension Father     Cancer Sister     Coronary artery disease Sister     Diabetes Sister     Hyperlipidemia Sister     Diabetes Brother        Social History:  Social History     Socioeconomic History    Marital status: Single     Spouse name: Not on file    Number of children: Not on file    Years of education: Not on file    Highest education level: Not on file   Occupational History    Not on file   Social Needs    Financial resource strain: Not on file    Food insecurity:     Worry: Not on file     Inability: Not on file    Transportation needs:     Medical: Not on file     Non-medical: Not on file   Tobacco Use    Smoking status: Never Smoker    Smokeless tobacco: Never Used   Substance and Sexual Activity    Alcohol use: Not Currently    Drug use: No    Sexual activity: Not on file   Lifestyle    Physical activity:     Days per week: Not on file     Minutes per session: Not on file    Stress: Not on file   Relationships    Social connections:     Talks on phone: Not on file     Gets together: Not on file     Attends Spiritism service: Not on file     Active member of club or organization: Not on file     Attends meetings of clubs or organizations: Not on file     Relationship status: Not on file    Intimate partner violence:     Fear of current or ex partner: Not on file     Emotionally abused: Not on file     Physically abused: Not on file     Forced sexual activity: Not on file   Other Topics Concern    Not on file   Social History Narrative    Daily coffee consumption (4 cups/day)       I have reviewed the patient's medical history in detail; there are no changes to the history as noted in the electronic medical record      Objective     Vitals:    20 1016   BP: 164/90   BP Location: Left arm   Patient Position: Sitting   Cuff Size: Adult   Pulse: 71   Resp: 18   Temp: 97 9 °F (36 6 °C)   TempSrc: Tympanic   SpO2: 98%   Weight: 58 7 kg (129 lb 6 oz)   Height: 4' 11" (1 499 m)     Wt Readings from Last 3 Encounters:   01/24/20 58 7 kg (129 lb 6 oz)   10/31/19 58 5 kg (129 lb)   10/02/19 58 3 kg (128 lb 8 oz)     Physical Exam   Constitutional: She appears well-developed and well-nourished  No distress  HENT:   Head: Normocephalic and atraumatic  Right Ear: Tympanic membrane and ear canal normal    Left Ear: Tympanic membrane and ear canal normal    Mouth/Throat: Uvula is midline  No oropharyngeal exudate, posterior oropharyngeal edema or posterior oropharyngeal erythema  Pale nasal turbinates  Clear post nasal drip  Eyes: Conjunctivae are normal    Neck: Normal range of motion  Neck supple  Cardiovascular: Normal rate, regular rhythm and normal heart sounds  Pulmonary/Chest: Effort normal and breath sounds normal    Abdominal: Soft  Bowel sounds are normal  There is no tenderness  There is no CVA tenderness  Lymphadenopathy:     She has no cervical adenopathy  Psychiatric: She has a normal mood and affect  Nursing note and vitals reviewed           ALLERGIES:  No Known Allergies    Current Medications     Current Outpatient Medications   Medication Sig Dispense Refill    amLODIPine (NORVASC) 10 mg tablet Take 1 tablet (10 mg total) by mouth daily 90 tablet 1    aspirin (ECOTRIN LOW STRENGTH) 81 mg EC tablet Take 81 mg by mouth daily      atorvastatin (LIPITOR) 40 mg tablet TAKE 1 TABLET EVERY DAY AT BEDTIME 90 tablet 3    levothyroxine 25 mcg tablet TAKE 1 TABLET EVERY DAY 90 tablet 1    losartan-hydrochlorothiazide (HYZAAR) 100-25 MG per tablet TAKE 1 TABLET EVERY DAY 90 tablet 3    metoprolol tartrate (LOPRESSOR) 50 mg tablet TAKE 1 TABLET TWICE DAILY 180 tablet 3    Multiple Vitamins-Minerals (PRESERVISION AREDS PO) Take by mouth daily      potassium chloride (K-DUR,KLOR-CON) 20 mEq tablet TAKE 1 TABLET TWICE DAILY 180 tablet 1     No current facility-administered medications for this visit  Health Maintenance     Health Maintenance   Topic Date Due    SLP PLAN OF CARE  1940    BMI: Followup Plan  12/26/1958    Pneumococcal Vaccine: 65+ Years (1 of 2 - PCV13) 12/26/2005    DTaP,Tdap,and Td Vaccines (1 - Tdap) 04/01/2020 (Originally 12/26/1951)    Influenza Vaccine  04/03/2020 (Originally 7/1/2019)    Fall Risk  04/03/2020    Depression Screening PHQ  04/03/2020    Medicare Annual Wellness Visit (AWV)  04/03/2020    BMI: Adult  01/24/2021    Pneumococcal Vaccine: Pediatrics (0 to 5 Years) and At-Risk Patients (6 to 59 Years)  Aged Out    HIB Vaccine  Aged Out    Hepatitis B Vaccine  Aged Out    IPV Vaccine  Aged Out    Hepatitis A Vaccine  Aged Out    Meningococcal ACWY Vaccine  Aged Out    HPV Vaccine  Aged Out       There is no immunization history on file for this patient      Niru Wilkins

## 2020-01-27 ENCOUNTER — LAB (OUTPATIENT)
Dept: LAB | Facility: CLINIC | Age: 80
End: 2020-01-27
Payer: MEDICARE

## 2020-01-27 ENCOUNTER — TELEPHONE (OUTPATIENT)
Dept: OTHER | Facility: OTHER | Age: 80
End: 2020-01-27

## 2020-01-27 ENCOUNTER — TELEPHONE (OUTPATIENT)
Dept: FAMILY MEDICINE CLINIC | Facility: CLINIC | Age: 80
End: 2020-01-27

## 2020-01-27 ENCOUNTER — TRANSCRIBE ORDERS (OUTPATIENT)
Dept: LAB | Facility: CLINIC | Age: 80
End: 2020-01-27

## 2020-01-27 DIAGNOSIS — R82.90 ABNORMAL FINDING IN URINE: Primary | ICD-10-CM

## 2020-01-27 DIAGNOSIS — R82.90 ABNORMAL FINDING IN URINE: ICD-10-CM

## 2020-01-27 LAB
BILIRUB UR QL STRIP: NEGATIVE
CLARITY UR: CLEAR
COLOR UR: YELLOW
GLUCOSE UR STRIP-MCNC: NEGATIVE MG/DL
HGB UR QL STRIP.AUTO: NEGATIVE
KETONES UR STRIP-MCNC: NEGATIVE MG/DL
LEUKOCYTE ESTERASE UR QL STRIP: NEGATIVE
NITRITE UR QL STRIP: NEGATIVE
PH UR STRIP.AUTO: 7.5 [PH]
PROT UR STRIP-MCNC: NEGATIVE MG/DL
SP GR UR STRIP.AUTO: 1.02 (ref 1–1.03)
UROBILINOGEN UR QL STRIP.AUTO: 0.2 E.U./DL

## 2020-01-27 PROCEDURE — 81003 URINALYSIS AUTO W/O SCOPE: CPT

## 2020-01-27 PROCEDURE — 87086 URINE CULTURE/COLONY COUNT: CPT

## 2020-01-27 NOTE — TELEPHONE ENCOUNTER
Please contact patient  The lab did not receive her urine sample sent last week  Please have her go to the lab, today if possible, to provide a repeat urine sample  I have placed orders in epic  Please inquire if her symptoms have worsened at all

## 2020-01-27 NOTE — TELEPHONE ENCOUNTER
Requesting the results of her urine study done today   Please call in am prior to 10am per pt request

## 2020-01-28 LAB — BACTERIA UR CULT: NORMAL

## 2020-01-28 NOTE — RESULT ENCOUNTER NOTE
Thank you for repeating urine sample  I apologize for the confusion  Urinalysis is normal    Urine culture is pending  This takes usually 2-3 days  We will call with results when available

## 2020-01-28 NOTE — RESULT ENCOUNTER NOTE
Please let patient know her urine culture is negative for infection  Please have her call for any persisting symptoms

## 2020-02-05 DIAGNOSIS — I10 ESSENTIAL HYPERTENSION: ICD-10-CM

## 2020-02-05 DIAGNOSIS — E78.2 MIXED HYPERLIPIDEMIA: ICD-10-CM

## 2020-02-05 RX ORDER — ATORVASTATIN CALCIUM 40 MG/1
TABLET, FILM COATED ORAL
Qty: 90 TABLET | Refills: 0 | Status: SHIPPED | OUTPATIENT
Start: 2020-02-05 | End: 2020-04-10

## 2020-02-05 RX ORDER — AMLODIPINE BESYLATE 10 MG/1
10 TABLET ORAL DAILY
Qty: 90 TABLET | Refills: 1 | Status: SHIPPED | OUTPATIENT
Start: 2020-02-05 | End: 2020-06-16

## 2020-02-05 RX ORDER — LOSARTAN POTASSIUM AND HYDROCHLOROTHIAZIDE 25; 100 MG/1; MG/1
TABLET ORAL
Qty: 90 TABLET | Refills: 0 | Status: SHIPPED | OUTPATIENT
Start: 2020-02-05 | End: 2020-04-10

## 2020-04-10 DIAGNOSIS — I10 ESSENTIAL HYPERTENSION: ICD-10-CM

## 2020-04-10 DIAGNOSIS — E03.9 HYPOTHYROIDISM, UNSPECIFIED TYPE: ICD-10-CM

## 2020-04-10 DIAGNOSIS — E87.6 HYPOKALEMIA: ICD-10-CM

## 2020-04-10 DIAGNOSIS — E78.2 MIXED HYPERLIPIDEMIA: ICD-10-CM

## 2020-04-10 RX ORDER — LOSARTAN POTASSIUM AND HYDROCHLOROTHIAZIDE 25; 100 MG/1; MG/1
TABLET ORAL
Qty: 90 TABLET | Refills: 0 | Status: SHIPPED | OUTPATIENT
Start: 2020-04-10 | End: 2020-06-09

## 2020-04-10 RX ORDER — POTASSIUM CHLORIDE 20 MEQ/1
TABLET, EXTENDED RELEASE ORAL
Qty: 180 TABLET | Refills: 1 | Status: SHIPPED | OUTPATIENT
Start: 2020-04-10 | End: 2020-09-18

## 2020-04-10 RX ORDER — ATORVASTATIN CALCIUM 40 MG/1
TABLET, FILM COATED ORAL
Qty: 90 TABLET | Refills: 0 | Status: SHIPPED | OUTPATIENT
Start: 2020-04-10 | End: 2020-06-09

## 2020-04-10 RX ORDER — LEVOTHYROXINE SODIUM 0.03 MG/1
TABLET ORAL
Qty: 90 TABLET | Refills: 1 | Status: SHIPPED | OUTPATIENT
Start: 2020-04-10 | End: 2020-09-18

## 2020-04-23 ENCOUNTER — TELEMEDICINE (OUTPATIENT)
Dept: CARDIOLOGY CLINIC | Facility: CLINIC | Age: 80
End: 2020-04-23
Payer: MEDICARE

## 2020-04-23 DIAGNOSIS — I10 ESSENTIAL HYPERTENSION: ICD-10-CM

## 2020-04-23 DIAGNOSIS — E78.2 MIXED HYPERLIPIDEMIA: ICD-10-CM

## 2020-04-23 DIAGNOSIS — I25.10 ARTERIOSCLEROSIS OF CORONARY ARTERY: Primary | ICD-10-CM

## 2020-04-23 PROCEDURE — 99442 PR PHYS/QHP TELEPHONE EVALUATION 11-20 MIN: CPT | Performed by: INTERNAL MEDICINE

## 2020-05-04 ENCOUNTER — APPOINTMENT (OUTPATIENT)
Dept: LAB | Facility: CLINIC | Age: 80
End: 2020-05-04
Payer: MEDICARE

## 2020-05-04 ENCOUNTER — OFFICE VISIT (OUTPATIENT)
Dept: FAMILY MEDICINE CLINIC | Facility: CLINIC | Age: 80
End: 2020-05-04
Payer: MEDICARE

## 2020-05-04 ENCOUNTER — TELEPHONE (OUTPATIENT)
Dept: FAMILY MEDICINE CLINIC | Facility: CLINIC | Age: 80
End: 2020-05-04

## 2020-05-04 VITALS
TEMPERATURE: 97 F | RESPIRATION RATE: 16 BRPM | SYSTOLIC BLOOD PRESSURE: 138 MMHG | HEIGHT: 59 IN | OXYGEN SATURATION: 97 % | WEIGHT: 127.8 LBS | BODY MASS INDEX: 25.76 KG/M2 | DIASTOLIC BLOOD PRESSURE: 70 MMHG | HEART RATE: 78 BPM

## 2020-05-04 DIAGNOSIS — I10 ESSENTIAL HYPERTENSION: ICD-10-CM

## 2020-05-04 DIAGNOSIS — E78.2 MIXED HYPERLIPIDEMIA: ICD-10-CM

## 2020-05-04 DIAGNOSIS — E03.9 HYPOTHYROIDISM, UNSPECIFIED TYPE: ICD-10-CM

## 2020-05-04 DIAGNOSIS — I25.10 ARTERIOSCLEROSIS OF CORONARY ARTERY: ICD-10-CM

## 2020-05-04 DIAGNOSIS — E03.9 HYPOTHYROIDISM, UNSPECIFIED TYPE: Primary | ICD-10-CM

## 2020-05-04 PROBLEM — H61.21 IMPACTED CERUMEN OF RIGHT EAR: Status: RESOLVED | Noted: 2019-04-03 | Resolved: 2020-05-04

## 2020-05-04 LAB
ALBUMIN SERPL BCP-MCNC: 4.1 G/DL (ref 3.5–5)
ALP SERPL-CCNC: 93 U/L (ref 46–116)
ALT SERPL W P-5'-P-CCNC: 21 U/L (ref 12–78)
ANION GAP SERPL CALCULATED.3IONS-SCNC: 9 MMOL/L (ref 4–13)
AST SERPL W P-5'-P-CCNC: 15 U/L (ref 5–45)
BILIRUB SERPL-MCNC: 0.86 MG/DL (ref 0.2–1)
BUN SERPL-MCNC: 14 MG/DL (ref 5–25)
CALCIUM SERPL-MCNC: 9.4 MG/DL (ref 8.3–10.1)
CHLORIDE SERPL-SCNC: 99 MMOL/L (ref 100–108)
CHOLEST SERPL-MCNC: 105 MG/DL (ref 50–200)
CO2 SERPL-SCNC: 26 MMOL/L (ref 21–32)
CREAT SERPL-MCNC: 0.79 MG/DL (ref 0.6–1.3)
ERYTHROCYTE [DISTWIDTH] IN BLOOD BY AUTOMATED COUNT: 12.8 % (ref 11.6–15.1)
GFR SERPL CREATININE-BSD FRML MDRD: 71 ML/MIN/1.73SQ M
GLUCOSE P FAST SERPL-MCNC: 109 MG/DL (ref 65–99)
HCT VFR BLD AUTO: 44.8 % (ref 34.8–46.1)
HDLC SERPL-MCNC: 43 MG/DL
HGB BLD-MCNC: 14.6 G/DL (ref 11.5–15.4)
LDLC SERPL CALC-MCNC: 48 MG/DL (ref 0–100)
MCH RBC QN AUTO: 29.5 PG (ref 26.8–34.3)
MCHC RBC AUTO-ENTMCNC: 32.6 G/DL (ref 31.4–37.4)
MCV RBC AUTO: 91 FL (ref 82–98)
NONHDLC SERPL-MCNC: 62 MG/DL
PLATELET # BLD AUTO: 249 THOUSANDS/UL (ref 149–390)
PMV BLD AUTO: 10.4 FL (ref 8.9–12.7)
POTASSIUM SERPL-SCNC: 4.2 MMOL/L (ref 3.5–5.3)
PROT SERPL-MCNC: 7.7 G/DL (ref 6.4–8.2)
RBC # BLD AUTO: 4.95 MILLION/UL (ref 3.81–5.12)
SODIUM SERPL-SCNC: 134 MMOL/L (ref 136–145)
TRIGL SERPL-MCNC: 68 MG/DL
TSH SERPL DL<=0.05 MIU/L-ACNC: 1.95 UIU/ML (ref 0.36–3.74)
WBC # BLD AUTO: 9.64 THOUSAND/UL (ref 4.31–10.16)

## 2020-05-04 PROCEDURE — 80061 LIPID PANEL: CPT | Performed by: INTERNAL MEDICINE

## 2020-05-04 PROCEDURE — G0439 PPPS, SUBSEQ VISIT: HCPCS | Performed by: FAMILY MEDICINE

## 2020-05-04 PROCEDURE — 36415 COLL VENOUS BLD VENIPUNCTURE: CPT

## 2020-05-04 PROCEDURE — 1123F ACP DISCUSS/DSCN MKR DOCD: CPT | Performed by: FAMILY MEDICINE

## 2020-05-04 PROCEDURE — 1170F FXNL STATUS ASSESSED: CPT | Performed by: FAMILY MEDICINE

## 2020-05-04 PROCEDURE — 80053 COMPREHEN METABOLIC PANEL: CPT

## 2020-05-04 PROCEDURE — 1036F TOBACCO NON-USER: CPT | Performed by: FAMILY MEDICINE

## 2020-05-04 PROCEDURE — 1160F RVW MEDS BY RX/DR IN RCRD: CPT | Performed by: FAMILY MEDICINE

## 2020-05-04 PROCEDURE — 1125F AMNT PAIN NOTED PAIN PRSNT: CPT | Performed by: FAMILY MEDICINE

## 2020-05-04 PROCEDURE — 3008F BODY MASS INDEX DOCD: CPT | Performed by: FAMILY MEDICINE

## 2020-05-04 PROCEDURE — 85027 COMPLETE CBC AUTOMATED: CPT

## 2020-05-04 PROCEDURE — 99214 OFFICE O/P EST MOD 30 MIN: CPT | Performed by: FAMILY MEDICINE

## 2020-05-04 PROCEDURE — 3078F DIAST BP <80 MM HG: CPT | Performed by: FAMILY MEDICINE

## 2020-05-04 PROCEDURE — 84443 ASSAY THYROID STIM HORMONE: CPT

## 2020-05-04 PROCEDURE — 3075F SYST BP GE 130 - 139MM HG: CPT | Performed by: FAMILY MEDICINE

## 2020-06-09 DIAGNOSIS — I10 ESSENTIAL HYPERTENSION: ICD-10-CM

## 2020-06-09 DIAGNOSIS — E78.2 MIXED HYPERLIPIDEMIA: ICD-10-CM

## 2020-06-09 RX ORDER — ATORVASTATIN CALCIUM 40 MG/1
TABLET, FILM COATED ORAL
Qty: 90 TABLET | Refills: 0 | Status: SHIPPED | OUTPATIENT
Start: 2020-06-09 | End: 2020-09-18

## 2020-06-09 RX ORDER — LOSARTAN POTASSIUM AND HYDROCHLOROTHIAZIDE 25; 100 MG/1; MG/1
TABLET ORAL
Qty: 90 TABLET | Refills: 0 | Status: SHIPPED | OUTPATIENT
Start: 2020-06-09 | End: 2020-09-18

## 2020-06-16 DIAGNOSIS — I48.0 PAROXYSMAL ATRIAL FIBRILLATION (HCC): ICD-10-CM

## 2020-06-16 DIAGNOSIS — I10 ESSENTIAL HYPERTENSION: ICD-10-CM

## 2020-06-16 RX ORDER — AMLODIPINE BESYLATE 10 MG/1
TABLET ORAL
Qty: 90 TABLET | Refills: 1 | Status: SHIPPED | OUTPATIENT
Start: 2020-06-16 | End: 2020-12-02

## 2020-06-16 RX ORDER — METOPROLOL TARTRATE 50 MG/1
TABLET, FILM COATED ORAL
Qty: 180 TABLET | Refills: 3 | Status: SHIPPED | OUTPATIENT
Start: 2020-06-16 | End: 2021-03-10 | Stop reason: SDUPTHER

## 2020-09-18 DIAGNOSIS — E87.6 HYPOKALEMIA: ICD-10-CM

## 2020-09-18 DIAGNOSIS — I10 ESSENTIAL HYPERTENSION: ICD-10-CM

## 2020-09-18 DIAGNOSIS — E03.9 HYPOTHYROIDISM, UNSPECIFIED TYPE: ICD-10-CM

## 2020-09-18 DIAGNOSIS — E78.2 MIXED HYPERLIPIDEMIA: ICD-10-CM

## 2020-09-18 RX ORDER — LOSARTAN POTASSIUM AND HYDROCHLOROTHIAZIDE 25; 100 MG/1; MG/1
TABLET ORAL
Qty: 90 TABLET | Refills: 0 | Status: SHIPPED | OUTPATIENT
Start: 2020-09-18 | End: 2020-12-02

## 2020-09-18 RX ORDER — LEVOTHYROXINE SODIUM 0.03 MG/1
TABLET ORAL
Qty: 90 TABLET | Refills: 1 | Status: SHIPPED | OUTPATIENT
Start: 2020-09-18 | End: 2021-02-07

## 2020-09-18 RX ORDER — ATORVASTATIN CALCIUM 40 MG/1
TABLET, FILM COATED ORAL
Qty: 90 TABLET | Refills: 0 | Status: SHIPPED | OUTPATIENT
Start: 2020-09-18 | End: 2020-12-02

## 2020-09-18 RX ORDER — POTASSIUM CHLORIDE 20 MEQ/1
TABLET, EXTENDED RELEASE ORAL
Qty: 180 TABLET | Refills: 1 | Status: SHIPPED | OUTPATIENT
Start: 2020-09-18 | End: 2021-02-07

## 2020-10-27 ENCOUNTER — OFFICE VISIT (OUTPATIENT)
Dept: CARDIOLOGY CLINIC | Facility: CLINIC | Age: 80
End: 2020-10-27
Payer: MEDICARE

## 2020-10-27 VITALS
TEMPERATURE: 96.9 F | SYSTOLIC BLOOD PRESSURE: 156 MMHG | WEIGHT: 125.4 LBS | BODY MASS INDEX: 25.28 KG/M2 | DIASTOLIC BLOOD PRESSURE: 68 MMHG | HEIGHT: 59 IN | HEART RATE: 62 BPM

## 2020-10-27 DIAGNOSIS — I10 ESSENTIAL HYPERTENSION: ICD-10-CM

## 2020-10-27 DIAGNOSIS — I48.0 PAROXYSMAL ATRIAL FIBRILLATION (HCC): Primary | ICD-10-CM

## 2020-10-27 DIAGNOSIS — E78.2 MIXED HYPERLIPIDEMIA: ICD-10-CM

## 2020-10-27 DIAGNOSIS — I25.10 ARTERIOSCLEROSIS OF CORONARY ARTERY: ICD-10-CM

## 2020-10-27 PROCEDURE — 93000 ELECTROCARDIOGRAM COMPLETE: CPT | Performed by: INTERNAL MEDICINE

## 2020-10-27 PROCEDURE — 99214 OFFICE O/P EST MOD 30 MIN: CPT | Performed by: INTERNAL MEDICINE

## 2020-11-04 ENCOUNTER — OFFICE VISIT (OUTPATIENT)
Dept: FAMILY MEDICINE CLINIC | Facility: CLINIC | Age: 80
End: 2020-11-04
Payer: MEDICARE

## 2020-11-04 ENCOUNTER — APPOINTMENT (OUTPATIENT)
Dept: LAB | Facility: CLINIC | Age: 80
End: 2020-11-04
Payer: MEDICARE

## 2020-11-04 ENCOUNTER — TELEPHONE (OUTPATIENT)
Dept: FAMILY MEDICINE CLINIC | Facility: CLINIC | Age: 80
End: 2020-11-04

## 2020-11-04 VITALS — DIASTOLIC BLOOD PRESSURE: 80 MMHG | SYSTOLIC BLOOD PRESSURE: 140 MMHG

## 2020-11-04 DIAGNOSIS — E03.9 HYPOTHYROIDISM, UNSPECIFIED TYPE: ICD-10-CM

## 2020-11-04 DIAGNOSIS — I10 ESSENTIAL HYPERTENSION: ICD-10-CM

## 2020-11-04 DIAGNOSIS — I10 ESSENTIAL HYPERTENSION: Primary | ICD-10-CM

## 2020-11-04 DIAGNOSIS — E78.2 MIXED HYPERLIPIDEMIA: ICD-10-CM

## 2020-11-04 LAB
ALBUMIN SERPL BCP-MCNC: 4 G/DL (ref 3.5–5)
ALP SERPL-CCNC: 93 U/L (ref 46–116)
ALT SERPL W P-5'-P-CCNC: 21 U/L (ref 12–78)
ANION GAP SERPL CALCULATED.3IONS-SCNC: 5 MMOL/L (ref 4–13)
AST SERPL W P-5'-P-CCNC: 15 U/L (ref 5–45)
BACTERIA UR QL AUTO: NORMAL /HPF
BILIRUB SERPL-MCNC: 0.74 MG/DL (ref 0.2–1)
BILIRUB UR QL STRIP: NEGATIVE
BUN SERPL-MCNC: 12 MG/DL (ref 5–25)
CALCIUM SERPL-MCNC: 9.9 MG/DL (ref 8.3–10.1)
CHLORIDE SERPL-SCNC: 101 MMOL/L (ref 100–108)
CHOLEST SERPL-MCNC: 103 MG/DL (ref 50–200)
CLARITY UR: CLEAR
CO2 SERPL-SCNC: 29 MMOL/L (ref 21–32)
COLOR UR: YELLOW
CREAT SERPL-MCNC: 0.72 MG/DL (ref 0.6–1.3)
ERYTHROCYTE [DISTWIDTH] IN BLOOD BY AUTOMATED COUNT: 12.7 % (ref 11.6–15.1)
GFR SERPL CREATININE-BSD FRML MDRD: 80 ML/MIN/1.73SQ M
GLUCOSE P FAST SERPL-MCNC: 108 MG/DL (ref 65–99)
GLUCOSE UR STRIP-MCNC: NEGATIVE MG/DL
HCT VFR BLD AUTO: 44.9 % (ref 34.8–46.1)
HDLC SERPL-MCNC: 51 MG/DL
HGB BLD-MCNC: 14.5 G/DL (ref 11.5–15.4)
HGB UR QL STRIP.AUTO: NEGATIVE
HYALINE CASTS #/AREA URNS LPF: NORMAL /LPF
KETONES UR STRIP-MCNC: NEGATIVE MG/DL
LDLC SERPL CALC-MCNC: 38 MG/DL (ref 0–100)
LEUKOCYTE ESTERASE UR QL STRIP: NEGATIVE
MCH RBC QN AUTO: 29.4 PG (ref 26.8–34.3)
MCHC RBC AUTO-ENTMCNC: 32.3 G/DL (ref 31.4–37.4)
MCV RBC AUTO: 91 FL (ref 82–98)
NITRITE UR QL STRIP: NEGATIVE
NON-SQ EPI CELLS URNS QL MICRO: NORMAL /HPF
NONHDLC SERPL-MCNC: 52 MG/DL
PH UR STRIP.AUTO: 7.5 [PH]
PLATELET # BLD AUTO: 261 THOUSANDS/UL (ref 149–390)
PMV BLD AUTO: 10.7 FL (ref 8.9–12.7)
POTASSIUM SERPL-SCNC: 4.6 MMOL/L (ref 3.5–5.3)
PROT SERPL-MCNC: 7.4 G/DL (ref 6.4–8.2)
PROT UR STRIP-MCNC: NEGATIVE MG/DL
RBC # BLD AUTO: 4.93 MILLION/UL (ref 3.81–5.12)
RBC #/AREA URNS AUTO: NORMAL /HPF
SODIUM SERPL-SCNC: 135 MMOL/L (ref 136–145)
SP GR UR STRIP.AUTO: 1.01 (ref 1–1.03)
TRIGL SERPL-MCNC: 70 MG/DL
TSH SERPL DL<=0.05 MIU/L-ACNC: 1.64 UIU/ML (ref 0.36–3.74)
UROBILINOGEN UR QL STRIP.AUTO: 0.2 E.U./DL
WBC # BLD AUTO: 6.62 THOUSAND/UL (ref 4.31–10.16)
WBC #/AREA URNS AUTO: NORMAL /HPF

## 2020-11-04 PROCEDURE — 85027 COMPLETE CBC AUTOMATED: CPT

## 2020-11-04 PROCEDURE — 80053 COMPREHEN METABOLIC PANEL: CPT

## 2020-11-04 PROCEDURE — 84443 ASSAY THYROID STIM HORMONE: CPT

## 2020-11-04 PROCEDURE — 99214 OFFICE O/P EST MOD 30 MIN: CPT | Performed by: FAMILY MEDICINE

## 2020-11-04 PROCEDURE — 36415 COLL VENOUS BLD VENIPUNCTURE: CPT

## 2020-11-04 PROCEDURE — 81001 URINALYSIS AUTO W/SCOPE: CPT | Performed by: FAMILY MEDICINE

## 2020-11-04 PROCEDURE — 80061 LIPID PANEL: CPT

## 2020-11-05 ENCOUNTER — TELEPHONE (OUTPATIENT)
Dept: FAMILY MEDICINE CLINIC | Facility: CLINIC | Age: 80
End: 2020-11-05

## 2020-12-02 DIAGNOSIS — E78.2 MIXED HYPERLIPIDEMIA: ICD-10-CM

## 2020-12-02 DIAGNOSIS — I10 ESSENTIAL HYPERTENSION: ICD-10-CM

## 2020-12-02 RX ORDER — AMLODIPINE BESYLATE 10 MG/1
TABLET ORAL
Qty: 90 TABLET | Refills: 1 | Status: SHIPPED | OUTPATIENT
Start: 2020-12-02 | End: 2021-03-10 | Stop reason: SDUPTHER

## 2020-12-02 RX ORDER — ATORVASTATIN CALCIUM 40 MG/1
TABLET, FILM COATED ORAL
Qty: 90 TABLET | Refills: 0 | Status: SHIPPED | OUTPATIENT
Start: 2020-12-02 | End: 2021-02-08

## 2020-12-02 RX ORDER — LOSARTAN POTASSIUM AND HYDROCHLOROTHIAZIDE 25; 100 MG/1; MG/1
TABLET ORAL
Qty: 90 TABLET | Refills: 0 | Status: SHIPPED | OUTPATIENT
Start: 2020-12-02 | End: 2021-02-08

## 2020-12-10 ENCOUNTER — OFFICE VISIT (OUTPATIENT)
Dept: URGENT CARE | Age: 80
End: 2020-12-10
Payer: MEDICARE

## 2020-12-10 VITALS — TEMPERATURE: 98.6 F | RESPIRATION RATE: 18 BRPM | OXYGEN SATURATION: 95 % | HEART RATE: 78 BPM

## 2020-12-10 DIAGNOSIS — Z20.822 EXPOSURE TO COVID-19 VIRUS: Primary | ICD-10-CM

## 2020-12-10 PROCEDURE — 99213 OFFICE O/P EST LOW 20 MIN: CPT | Performed by: NURSE PRACTITIONER

## 2020-12-10 PROCEDURE — G0463 HOSPITAL OUTPT CLINIC VISIT: HCPCS | Performed by: NURSE PRACTITIONER

## 2020-12-10 PROCEDURE — U0003 INFECTIOUS AGENT DETECTION BY NUCLEIC ACID (DNA OR RNA); SEVERE ACUTE RESPIRATORY SYNDROME CORONAVIRUS 2 (SARS-COV-2) (CORONAVIRUS DISEASE [COVID-19]), AMPLIFIED PROBE TECHNIQUE, MAKING USE OF HIGH THROUGHPUT TECHNOLOGIES AS DESCRIBED BY CMS-2020-01-R: HCPCS | Performed by: NURSE PRACTITIONER

## 2020-12-12 LAB — SARS-COV-2 RNA SPEC QL NAA+PROBE: DETECTED

## 2020-12-13 ENCOUNTER — TELEPHONE (OUTPATIENT)
Dept: URGENT CARE | Age: 80
End: 2020-12-13

## 2021-02-07 DIAGNOSIS — E03.9 HYPOTHYROIDISM, UNSPECIFIED TYPE: ICD-10-CM

## 2021-02-07 DIAGNOSIS — E87.6 HYPOKALEMIA: ICD-10-CM

## 2021-02-07 RX ORDER — LEVOTHYROXINE SODIUM 0.03 MG/1
TABLET ORAL
Qty: 90 TABLET | Refills: 1 | Status: SHIPPED | OUTPATIENT
Start: 2021-02-07 | End: 2021-07-30

## 2021-02-07 RX ORDER — POTASSIUM CHLORIDE 20 MEQ/1
TABLET, EXTENDED RELEASE ORAL
Qty: 180 TABLET | Refills: 1 | Status: SHIPPED | OUTPATIENT
Start: 2021-02-07 | End: 2021-05-10 | Stop reason: ALTCHOICE

## 2021-02-08 DIAGNOSIS — E78.2 MIXED HYPERLIPIDEMIA: ICD-10-CM

## 2021-02-08 DIAGNOSIS — I10 ESSENTIAL HYPERTENSION: ICD-10-CM

## 2021-02-08 RX ORDER — ATORVASTATIN CALCIUM 40 MG/1
TABLET, FILM COATED ORAL
Qty: 90 TABLET | Refills: 0 | Status: SHIPPED | OUTPATIENT
Start: 2021-02-08 | End: 2021-03-10 | Stop reason: SDUPTHER

## 2021-02-08 RX ORDER — LOSARTAN POTASSIUM AND HYDROCHLOROTHIAZIDE 25; 100 MG/1; MG/1
TABLET ORAL
Qty: 90 TABLET | Refills: 0 | Status: SHIPPED | OUTPATIENT
Start: 2021-02-08 | End: 2021-05-10 | Stop reason: ALTCHOICE

## 2021-03-10 DIAGNOSIS — I10 ESSENTIAL HYPERTENSION: ICD-10-CM

## 2021-03-10 DIAGNOSIS — E78.2 MIXED HYPERLIPIDEMIA: ICD-10-CM

## 2021-03-10 DIAGNOSIS — I48.0 PAROXYSMAL ATRIAL FIBRILLATION (HCC): ICD-10-CM

## 2021-03-10 RX ORDER — METOPROLOL TARTRATE 50 MG/1
50 TABLET, FILM COATED ORAL 2 TIMES DAILY
Qty: 180 TABLET | Refills: 2 | Status: SHIPPED | OUTPATIENT
Start: 2021-03-10 | End: 2021-11-22

## 2021-03-10 RX ORDER — ATORVASTATIN CALCIUM 40 MG/1
40 TABLET, FILM COATED ORAL
Qty: 90 TABLET | Refills: 2 | Status: SHIPPED | OUTPATIENT
Start: 2021-03-10 | End: 2021-11-22

## 2021-03-10 RX ORDER — AMLODIPINE BESYLATE 10 MG/1
10 TABLET ORAL DAILY
Qty: 90 TABLET | Refills: 2 | Status: SHIPPED | OUTPATIENT
Start: 2021-03-10 | End: 2021-11-22

## 2021-04-22 ENCOUNTER — OFFICE VISIT (OUTPATIENT)
Dept: CARDIOLOGY CLINIC | Facility: CLINIC | Age: 81
End: 2021-04-22
Payer: MEDICARE

## 2021-04-22 VITALS
HEIGHT: 59 IN | WEIGHT: 121.3 LBS | BODY MASS INDEX: 24.45 KG/M2 | SYSTOLIC BLOOD PRESSURE: 142 MMHG | DIASTOLIC BLOOD PRESSURE: 80 MMHG | HEART RATE: 53 BPM

## 2021-04-22 DIAGNOSIS — E78.2 MIXED HYPERLIPIDEMIA: ICD-10-CM

## 2021-04-22 DIAGNOSIS — I25.10 ARTERIOSCLEROSIS OF CORONARY ARTERY: ICD-10-CM

## 2021-04-22 DIAGNOSIS — I48.0 PAROXYSMAL ATRIAL FIBRILLATION (HCC): Primary | ICD-10-CM

## 2021-04-22 DIAGNOSIS — I10 ESSENTIAL HYPERTENSION: ICD-10-CM

## 2021-04-22 PROCEDURE — 99214 OFFICE O/P EST MOD 30 MIN: CPT | Performed by: INTERNAL MEDICINE

## 2021-04-22 PROCEDURE — 93000 ELECTROCARDIOGRAM COMPLETE: CPT | Performed by: INTERNAL MEDICINE

## 2021-04-22 NOTE — PROGRESS NOTES
Cardiology Follow Up    Diana Ramirez  1940  [id number]     HEART & VASCULAR [facility] [address]    1  Paroxysmal atrial fibrillation (HCC)  POCT ECG   2  Essential hypertension     3  Arteriosclerosis of coronary artery     4  Mixed hyperlipidemia       Chief Complaint   Patient presents with    Follow-up     no cardiac complaints       Interval History: Patient feels well, without complaints  No reported chest pain, shortness of breath, palpitations, lightheadedness, syncope, LE edema, orthopnea, PND, or significant weight changes  Patient remains active without any increased fatigue out of the ordinary        Patient Active Problem List   Diagnosis    Arteriosclerosis of coronary artery    Hyperlipidemia    Hypertension    Esophageal reflux    Generalized anxiety disorder    Hypothyroidism    Well adult exam    Left lower quadrant pain    Paroxysmal atrial fibrillation (HCC)     Past Medical History:   Diagnosis Date    Coronary artery disease      Social History     Socioeconomic History    Marital status: Single     Spouse name: Not on file    Number of children: Not on file    Years of education: Not on file    Highest education level: Not on file   Occupational History    Not on file   Social Needs    Financial resource strain: Not on file    Food insecurity     Worry: Not on file     Inability: Not on file    Transportation needs     Medical: Not on file     Non-medical: Not on file   Tobacco Use    Smoking status: Never Smoker    Smokeless tobacco: Never Used   Substance and Sexual Activity    Alcohol use: Not Currently    Drug use: No    Sexual activity: Not Currently   Lifestyle    Physical activity     Days per week: Not on file     Minutes per session: Not on file    Stress: Not on file   Relationships    Social connections     Talks on phone: Not on file Gets together: Not on file     Attends Mosque service: Not on file     Active member of club or organization: Not on file     Attends meetings of clubs or organizations: Not on file     Relationship status: Not on file    Intimate partner violence     Fear of current or ex partner: Not on file     Emotionally abused: Not on file     Physically abused: Not on file     Forced sexual activity: Not on file   Other Topics Concern    Not on file   Social History Narrative    Daily coffee consumption (4 cups/day)      Family History   Problem Relation Age of Onset    Heart attack Mother         prior MI,  at the age of 80   Florencio Perkins Coronary artery disease Father     Hypertension Father     Cancer Sister     Coronary artery disease Sister     Diabetes Sister     Hyperlipidemia Sister     Diabetes Brother      Past Surgical History:   Procedure Laterality Date    CARDIAC CATHETERIZATION      post cath with PCI to the proximal LAD with a xience stent 2013    CORONARY ANGIOPLASTY WITH STENT PLACEMENT         Current Outpatient Medications:     amLODIPine (NORVASC) 10 mg tablet, Take 1 tablet (10 mg total) by mouth daily, Disp: 90 tablet, Rfl: 2    aspirin (ECOTRIN LOW STRENGTH) 81 mg EC tablet, Take 81 mg by mouth daily, Disp: , Rfl:     atorvastatin (LIPITOR) 40 mg tablet, Take 1 tablet (40 mg total) by mouth daily at bedtime, Disp: 90 tablet, Rfl: 2    levothyroxine 25 mcg tablet, TAKE 1 TABLET EVERY DAY, Disp: 90 tablet, Rfl: 1    losartan-hydrochlorothiazide (HYZAAR) 100-25 MG per tablet, TAKE 1 TABLET EVERY DAY, Disp: 90 tablet, Rfl: 0    metoprolol tartrate (LOPRESSOR) 50 mg tablet, Take 1 tablet (50 mg total) by mouth 2 (two) times a day, Disp: 180 tablet, Rfl: 2    Multiple Vitamins-Minerals (PRESERVISION AREDS PO), Take by mouth daily, Disp: , Rfl:     potassium chloride (K-DUR,KLOR-CON) 20 mEq tablet, TAKE 1 TABLET TWICE DAILY, Disp: 180 tablet, Rfl: 1  No Known Allergies    Labs:  Office Visit on 12/10/2020   Component Date Value    SARS-CoV-2  12/10/2020 Detected*   Appointment on 11/04/2020   Component Date Value    WBC 11/04/2020 6 62     RBC 11/04/2020 4 93     Hemoglobin 11/04/2020 14 5     Hematocrit 11/04/2020 44 9     MCV 11/04/2020 91     MCH 11/04/2020 29 4     MCHC 11/04/2020 32 3     RDW 11/04/2020 12 7     Platelets 70/58/9777 261     MPV 11/04/2020 10 7     Cholesterol 11/04/2020 103     Triglycerides 11/04/2020 70     HDL, Direct 11/04/2020 51     LDL Calculated 11/04/2020 38     Non-HDL-Chol (CHOL-HDL) 11/04/2020 52     Sodium 11/04/2020 135*    Potassium 11/04/2020 4 6     Chloride 11/04/2020 101     CO2 11/04/2020 29     ANION GAP 11/04/2020 5     BUN 11/04/2020 12     Creatinine 11/04/2020 0 72     Glucose, Fasting 11/04/2020 108*    Calcium 11/04/2020 9 9     AST 11/04/2020 15     ALT 11/04/2020 21     Alkaline Phosphatase 11/04/2020 93     Total Protein 11/04/2020 7 4     Albumin 11/04/2020 4 0     Total Bilirubin 11/04/2020 0 74     eGFR 11/04/2020 80     TSH 3RD GENERATON 11/04/2020 1 640    Office Visit on 11/04/2020   Component Date Value    Clarity, UA 11/04/2020 Clear     Color, UA 11/04/2020 Yellow     Specific Gravity, UA 11/04/2020 1 014     pH, UA 11/04/2020 7 5     Glucose, UA 11/04/2020 Negative     Ketones, UA 11/04/2020 Negative     Blood, UA 11/04/2020 Negative     Protein, UA 11/04/2020 Negative     Nitrite, UA 11/04/2020 Negative     Bilirubin, UA 11/04/2020 Negative     Urobilinogen, UA 11/04/2020 0 2     Leukocytes, UA 11/04/2020 Negative     WBC, UA 11/04/2020 None Seen     RBC, UA 11/04/2020 None Seen     Hyaline Casts, UA 11/04/2020 None Seen     Bacteria, UA 11/04/2020 None Seen     Epithelial Cells 11/04/2020 None Seen      Lab Results   Component Value Date    CHOL 99 10/26/2015    TRIG 70 11/04/2020    TRIG 64 10/26/2015    HDL 51 11/04/2020    HDL 40 10/26/2015     Imaging: No results found      Review of Systems:  Review of Systems   Constitutional: Negative for activity change, appetite change, chills, diaphoresis, fatigue and unexpected weight change  HENT: Negative for hearing loss, nosebleeds and sore throat  Eyes: Negative for photophobia and visual disturbance  Respiratory: Negative for cough, chest tightness, shortness of breath and wheezing  Cardiovascular: Negative for chest pain, palpitations and leg swelling  Gastrointestinal: Negative for abdominal pain, diarrhea, nausea and vomiting  Endocrine: Negative for polyuria  Genitourinary: Negative for dysuria, frequency and hematuria  Musculoskeletal: Negative for arthralgias, back pain, gait problem and neck pain  Skin: Negative for pallor and rash  Neurological: Negative for dizziness, syncope and headaches  Hematological: Does not bruise/bleed easily  Psychiatric/Behavioral: Negative for behavioral problems and confusion  Physical Exam:  Physical Exam   Constitutional: She is oriented to person, place, and time  She appears well-developed and well-nourished  HENT:   Head: Normocephalic and atraumatic  Nose: Nose normal    Eyes: Pupils are equal, round, and reactive to light  EOM are normal  No scleral icterus  Neck: Normal range of motion  Neck supple  No JVD present  Cardiovascular: Normal rate, regular rhythm and normal heart sounds  Exam reveals no gallop and no friction rub  No murmur heard  Pulmonary/Chest: Effort normal and breath sounds normal  No respiratory distress  She has no wheezes  She has no rales  Abdominal: Soft  Bowel sounds are normal  She exhibits no distension  There is no abdominal tenderness  Musculoskeletal: Normal range of motion  General: No deformity or edema  Neurological: She is alert and oriented to person, place, and time  No cranial nerve deficit  Skin: Skin is warm and dry  No rash noted  She is not diaphoretic  Psychiatric: She has a normal mood and affect   Her behavior is normal    Vitals reviewed  Blood pressure 142/80, pulse (!) 53, height 4' 11" (1 499 m), weight 55 kg (121 lb 4 8 oz)  EKG:  Sinus bradycardia  Possible lateral infarct, age undetermined  Abnormal ECG    Discussion/Summary:  1) CAD: s/p Xience stent Jan 2013, feels ok, compliant with medications  Heart healthy diet with increased fresh fruit and vegetables, lean proteins (chicken, fish, beans), whole grains (brown rice, whole wheat bread, whole wheat pasta), and reduce sugary desserts  Off Effient since it has been over 1 year of therapy since her PATRICK  Continue with baby ASA daily  Asymptomatic and doing well, no changes recommended to current regimen      2) HTN: BP better controlled, cough improved after lisinopril was stopped  Continue norvasc at 10mg daily along with losartan/HCTZ and metoprolol     3) Hyperlipidemia: LDL at goal, 39 in Oct 2018  Continue statin  LDL 49 in Oct 2019, 48 in May 2020, 45 in Nov 2020 - at goal     4) Paroxysmal afib: remote history, no recurrence  Continued on B-blocker, no AC necessary

## 2021-04-22 NOTE — PATIENT INSTRUCTIONS

## 2021-05-07 ENCOUNTER — APPOINTMENT (OUTPATIENT)
Dept: LAB | Facility: CLINIC | Age: 81
End: 2021-05-07
Payer: MEDICARE

## 2021-05-07 ENCOUNTER — OFFICE VISIT (OUTPATIENT)
Dept: FAMILY MEDICINE CLINIC | Facility: CLINIC | Age: 81
End: 2021-05-07
Payer: MEDICARE

## 2021-05-07 VITALS
OXYGEN SATURATION: 98 % | RESPIRATION RATE: 15 BRPM | DIASTOLIC BLOOD PRESSURE: 80 MMHG | WEIGHT: 121.2 LBS | BODY MASS INDEX: 24.44 KG/M2 | SYSTOLIC BLOOD PRESSURE: 124 MMHG | HEIGHT: 59 IN | HEART RATE: 64 BPM | TEMPERATURE: 96 F

## 2021-05-07 DIAGNOSIS — E78.2 MIXED HYPERLIPIDEMIA: ICD-10-CM

## 2021-05-07 DIAGNOSIS — I10 ESSENTIAL HYPERTENSION: Primary | ICD-10-CM

## 2021-05-07 DIAGNOSIS — I10 ESSENTIAL HYPERTENSION: ICD-10-CM

## 2021-05-07 DIAGNOSIS — E03.9 HYPOTHYROIDISM, UNSPECIFIED TYPE: ICD-10-CM

## 2021-05-07 LAB
ALBUMIN SERPL BCP-MCNC: 4 G/DL (ref 3.5–5)
ALP SERPL-CCNC: 94 U/L (ref 46–116)
ALT SERPL W P-5'-P-CCNC: 20 U/L (ref 12–78)
ANION GAP SERPL CALCULATED.3IONS-SCNC: 3 MMOL/L (ref 4–13)
AST SERPL W P-5'-P-CCNC: 11 U/L (ref 5–45)
BILIRUB SERPL-MCNC: 0.88 MG/DL (ref 0.2–1)
BUN SERPL-MCNC: 13 MG/DL (ref 5–25)
CALCIUM SERPL-MCNC: 9.7 MG/DL (ref 8.3–10.1)
CHLORIDE SERPL-SCNC: 99 MMOL/L (ref 100–108)
CO2 SERPL-SCNC: 30 MMOL/L (ref 21–32)
CREAT SERPL-MCNC: 0.57 MG/DL (ref 0.6–1.3)
GFR SERPL CREATININE-BSD FRML MDRD: 88 ML/MIN/1.73SQ M
GLUCOSE P FAST SERPL-MCNC: 95 MG/DL (ref 65–99)
POTASSIUM SERPL-SCNC: 4.1 MMOL/L (ref 3.5–5.3)
PROT SERPL-MCNC: 7.4 G/DL (ref 6.4–8.2)
SODIUM SERPL-SCNC: 132 MMOL/L (ref 136–145)

## 2021-05-07 PROCEDURE — 1123F ACP DISCUSS/DSCN MKR DOCD: CPT | Performed by: FAMILY MEDICINE

## 2021-05-07 PROCEDURE — 80053 COMPREHEN METABOLIC PANEL: CPT

## 2021-05-07 PROCEDURE — 36415 COLL VENOUS BLD VENIPUNCTURE: CPT

## 2021-05-07 PROCEDURE — G0439 PPPS, SUBSEQ VISIT: HCPCS | Performed by: FAMILY MEDICINE

## 2021-05-07 PROCEDURE — 99213 OFFICE O/P EST LOW 20 MIN: CPT | Performed by: FAMILY MEDICINE

## 2021-05-07 NOTE — PROGRESS NOTES
FAMILY PRACTICE OFFICE VISIT       NAME: Bharati Boateng  AGE: [de-identified] y o  SEX: female       : 1940        MRN: [id number]    DATE: 2021  TIME: 10:09 AM    Assessment and Plan     Problem List Items Addressed This Visit        Endocrine    Hypothyroidism      Hypothyroidism  Patient's latest TSH blood work was stable she will continue with current dose of levothyroxine            Cardiovascular and Mediastinum    Hypertension - Primary       Hypertension  Patient blood pressure is stable at this time she will continue current regimen of medications         Relevant Orders    Comprehensive metabolic panel       Other    Hyperlipidemia      Hyperlipidemia  Patient's lipid panel stable on current dose of statin therapy                   Chief Complaint     Chief Complaint   Patient presents with    Follow-up    Medicare Wellness Visit       History of Present Illness      Patient in the office to review chronic medical conditions  She saw her cardiologist a few weeks ago who stated patient was in stable health  She denies any recent illness  Patient refuses COVID vaccination at this time  Review of Systems   Review of Systems   Constitutional: Negative  HENT: Negative  Eyes: Negative  Respiratory: Negative  Cardiovascular: Negative  Gastrointestinal: Negative  Genitourinary: Negative  Musculoskeletal: Negative  Skin: Negative  Neurological: Negative  Psychiatric/Behavioral: Negative          Active Problem List     Patient Active Problem List   Diagnosis    Arteriosclerosis of coronary artery    Hyperlipidemia    Hypertension    Esophageal reflux    Generalized anxiety disorder    Hypothyroidism    Well adult exam    Left lower quadrant pain    Paroxysmal atrial fibrillation [facility])       Past Medical History:  Past Medical History:   Diagnosis Date    Coronary artery disease        Past Surgical History:  Past Surgical History:   Procedure Laterality Date    CARDIAC CATHETERIZATION      post cath with PCI to the proximal LAD with a xience stent 2013    CORONARY ANGIOPLASTY WITH STENT PLACEMENT         Family History:  Family History   Problem Relation Age of Onset    Heart attack Mother         prior MI,  at the age of 80   Diann Kate Coronary artery disease Father     Hypertension Father     Cancer Sister     Coronary artery disease Sister     Diabetes Sister     Hyperlipidemia Sister     Diabetes Brother        Social History:  Social History     Socioeconomic History    Marital status: Single     Spouse name: Not on file    Number of children: Not on file    Years of education: Not on file    Highest education level: Not on file   Occupational History    Not on file   Social Needs    Financial resource strain: Not on file    Food insecurity     Worry: Not on file     Inability: Not on file    Transportation needs     Medical: Not on file     Non-medical: Not on file   Tobacco Use    Smoking status: Never Smoker    Smokeless tobacco: Never Used   Substance and Sexual Activity    Alcohol use: Not Currently    Drug use: No    Sexual activity: Not Currently   Lifestyle    Physical activity     Days per week: Not on file     Minutes per session: Not on file    Stress: Not on file   Relationships    Social connections     Talks on phone: Not on file     Gets together: Not on file     Attends Tenriism service: Not on file     Active member of club or organization: Not on file     Attends meetings of clubs or organizations: Not on file     Relationship status: Not on file    Intimate partner violence     Fear of current or ex partner: Not on file     Emotionally abused: Not on file     Physically abused: Not on file     Forced sexual activity: Not on file   Other Topics Concern    Not on file   Social History Narrative    Daily coffee consumption (4 cups/day)       Objective     Vitals:    21 0944   BP: 124/80   Pulse: 64   Resp: 15   Temp: (!) 96 °F (35 6 °C)   SpO2: 98%     Wt Readings from Last 3 Encounters:   05/07/21 55 kg (121 lb 3 2 oz)   04/22/21 55 kg (121 lb 4 8 oz)   10/27/20 56 9 kg (125 lb 6 4 oz)       Physical Exam  Vitals signs and nursing note reviewed  Constitutional:       General: She is not in acute distress  Appearance: Normal appearance  She is well-developed  She is not ill-appearing  HENT:      Head: Normocephalic and atraumatic  Right Ear: Tympanic membrane, ear canal and external ear normal  There is no impacted cerumen  Left Ear: Tympanic membrane, ear canal and external ear normal  There is no impacted cerumen  Eyes:      General:         Right eye: No discharge  Left eye: No discharge  Extraocular Movements: Extraocular movements intact  Conjunctiva/sclera: Conjunctivae normal       Pupils: Pupils are equal, round, and reactive to light  Neck:      Musculoskeletal: Normal range of motion and neck supple  Thyroid: No thyromegaly  Cardiovascular:      Rate and Rhythm: Normal rate and regular rhythm  Heart sounds: Normal heart sounds  No murmur  Pulmonary:      Effort: Pulmonary effort is normal  No respiratory distress  Breath sounds: Normal breath sounds  No wheezing, rhonchi or rales  Abdominal:      General: Bowel sounds are normal       Palpations: Abdomen is soft  Tenderness: There is no abdominal tenderness  There is no guarding or rebound  Comments: NO hepatospenomegaly   Musculoskeletal: Normal range of motion  Right lower leg: No edema  Left lower leg: No edema  Lymphadenopathy:      Cervical: No cervical adenopathy  Skin:     Comments: NO RASHES   Neurological:      General: No focal deficit present  Mental Status: She is alert and oriented to person, place, and time  Mental status is at baseline  Cranial Nerves: Cranial nerve deficit present     Psychiatric:         Mood and Affect: Mood normal          Behavior: Behavior normal          Thought Content:  Thought content normal          Judgment: Judgment normal          Pertinent Laboratory/Diagnostic Studies:  Lab Results   Component Value Date    GLUCOSE 97 10/26/2015    BUN 12 11/04/2020    CREATININE 0 72 11/04/2020    CALCIUM 9 9 11/04/2020     10/26/2015    K 4 6 11/04/2020    CO2 29 11/04/2020     11/04/2020     Lab Results   Component Value Date    ALT 21 11/04/2020    AST 15 11/04/2020    ALKPHOS 93 11/04/2020    BILITOT 0 93 10/26/2015       Lab Results   Component Value Date    WBC 6 62 11/04/2020    HGB 14 5 11/04/2020    HCT 44 9 11/04/2020    MCV 91 11/04/2020     11/04/2020       No results found for: TSH    Lab Results   Component Value Date    CHOL 99 10/26/2015     Lab Results   Component Value Date    TRIG 70 11/04/2020     Lab Results   Component Value Date    HDL 51 11/04/2020     Lab Results   Component Value Date    LDLCALC 38 11/04/2020     No results found for: HGBA1C    Results for orders placed or performed in visit on 12/10/20   Novel Coronavirus (COVID-19), PCR [facility] - Office Collection    Specimen: Nose; Nares   Result Value Ref Range    SARS-CoV-2  Detected (A) Not Detected       Orders Placed This Encounter   Procedures    Comprehensive metabolic panel       ALLERGIES:  No Known Allergies    Current Medications     Current Outpatient Medications   Medication Sig Dispense Refill    amLODIPine (NORVASC) 10 mg tablet Take 1 tablet (10 mg total) by mouth daily 90 tablet 2    aspirin (ECOTRIN LOW STRENGTH) 81 mg EC tablet Take 81 mg by mouth daily      atorvastatin (LIPITOR) 40 mg tablet Take 1 tablet (40 mg total) by mouth daily at bedtime 90 tablet 2    levothyroxine 25 mcg tablet TAKE 1 TABLET EVERY DAY 90 tablet 1    losartan-hydrochlorothiazide (HYZAAR) 100-25 MG per tablet TAKE 1 TABLET EVERY DAY 90 tablet 0    metoprolol tartrate (LOPRESSOR) 50 mg tablet Take 1 tablet (50 mg total) by mouth 2 (two) times a day 180 tablet 2  Multiple Vitamins-Minerals (PRESERVISION AREDS PO) Take by mouth daily      potassium chloride (K-DUR,KLOR-CON) 20 mEq tablet TAKE 1 TABLET TWICE DAILY 180 tablet 1     No current facility-administered medications for this visit  Health Maintenance     Health Maintenance   Topic Date Due    SLP PLAN OF CARE  Never done    COVID-19 Vaccine (1) Never done    DTaP,Tdap,and Td Vaccines (1 - Tdap) Never done    Pneumococcal Vaccine: 65+ Years (1 of 1 - PPSV23) Never done   Best Rare Pink Annual Wellness Visit (AWV)  05/04/2021    Influenza Vaccine (Season Ended) 09/01/2021    Fall Risk  05/07/2022    Depression Screening PHQ  05/07/2022    BMI: Adult  05/07/2022    HIB Vaccine  Aged Out    Hepatitis B Vaccine  Aged Out    IPV Vaccine  Aged Out    Hepatitis A Vaccine  Aged Out    Meningococcal ACWY Vaccine  Aged Out    HPV Vaccine  Aged Out       There is no immunization history on file for this patient      Perry Gonzalez MD

## 2021-05-07 NOTE — PROGRESS NOTES
Assessment and Plan:     Problem List Items Addressed This Visit     None           Preventive health issues were discussed with patient, and age appropriate screening tests were ordered as noted in patient's After Visit Summary  Personalized health advice and appropriate referrals for health education or preventive services given if needed, as noted in patient's After Visit Summary       History of Present Illness:     Patient presents for Medicare Annual Wellness visit    Patient Care Team:  Perry Gonzalez MD as PCP - MD Perry Cohen MD     Problem List:     Patient Active Problem List   Diagnosis    Arteriosclerosis of coronary artery    Hyperlipidemia    Hypertension    Esophageal reflux    Generalized anxiety disorder    Hypothyroidism    Well adult exam    Left lower quadrant pain    Paroxysmal atrial fibrillation [facility])      Past Medical and Surgical History:     Past Medical History:   Diagnosis Date    Coronary artery disease      Past Surgical History:   Procedure Laterality Date    CARDIAC CATHETERIZATION      post cath with PCI to the proximal LAD with a xience stent 2013    CORONARY ANGIOPLASTY WITH STENT PLACEMENT        Family History:     Family History   Problem Relation Age of Onset    Heart attack Mother         prior MI,  at the age of 80   Cesilia Hilario Coronary artery disease Father     Hypertension Father    Cesilia Hilario Cancer Sister     Coronary artery disease Sister     Diabetes Sister     Hyperlipidemia Sister     Diabetes Brother       Social History:     E-Cigarette/Vaping    E-Cigarette Use Never User      E-Cigarette/Vaping Substances    Nicotine No     THC No     CBD No     Flavoring No     Other No     Unknown No      Social History     Socioeconomic History    Marital status: Single     Spouse name: None    Number of children: None    Years of education: None    Highest education level: None   Occupational History    None   Social Needs    Financial resource strain: None    Food insecurity     Worry: None     Inability: None    Transportation needs     Medical: None     Non-medical: None   Tobacco Use    Smoking status: Never Smoker    Smokeless tobacco: Never Used   Substance and Sexual Activity    Alcohol use: Not Currently    Drug use: No    Sexual activity: Not Currently   Lifestyle    Physical activity     Days per week: None     Minutes per session: None    Stress: None   Relationships    Social connections     Talks on phone: None     Gets together: None     Attends [employer] service: None     Active member of club or organization: None     Attends meetings of clubs or organizations: None     Relationship status: None    Intimate partner violence     Fear of current or ex partner: None     Emotionally abused: None     Physically abused: None     Forced sexual activity: None   Other Topics Concern    None   Social History Narrative    Daily coffee consumption (4 cups/day)      Medications and Allergies:     Current Outpatient Medications   Medication Sig Dispense Refill    amLODIPine (NORVASC) 10 mg tablet Take 1 tablet (10 mg total) by mouth daily 90 tablet 2    aspirin (ECOTRIN LOW STRENGTH) 81 mg EC tablet Take 81 mg by mouth daily      atorvastatin (LIPITOR) 40 mg tablet Take 1 tablet (40 mg total) by mouth daily at bedtime 90 tablet 2    levothyroxine 25 mcg tablet TAKE 1 TABLET EVERY DAY 90 tablet 1    losartan-hydrochlorothiazide (HYZAAR) 100-25 MG per tablet TAKE 1 TABLET EVERY DAY 90 tablet 0    metoprolol tartrate (LOPRESSOR) 50 mg tablet Take 1 tablet (50 mg total) by mouth 2 (two) times a day 180 tablet 2    Multiple Vitamins-Minerals (PRESERVISION AREDS PO) Take by mouth daily      potassium chloride (K-DUR,KLOR-CON) 20 mEq tablet TAKE 1 TABLET TWICE DAILY 180 tablet 1     No current facility-administered medications for this visit  No Known Allergies   Immunizations:        There is no immunization history on file for this patient  Health Maintenance: There are no preventive care reminders to display for this patient  Topic Date Due    COVID-19 Vaccine (1) Never done    DTaP,Tdap,and Td Vaccines (1 - Tdap) Never done    Pneumococcal Vaccine: 65+ Years (1 of 1 - PPSV23) Never done      Medicare Health Risk Assessment:     Temp (!) 96 °F (35 6 °C) (Temporal)   Resp 15   Ht 4' 11" (1 499 m)   Wt 55 kg (121 lb 3 2 oz)   LMP  (LMP Unknown)   BMI 24 48 kg/m²      Elma Shown is here for her Subsequent Wellness visit  Health Risk Assessment:   Patient rates overall health as very good  Patient feels that their physical health rating is same  Patient is satisfied with their life  Eyesight was rated as slightly worse  Hearing was rated as same  Patient feels that their emotional and mental health rating is same  Patients states they are never, rarely angry  Patient states they are sometimes unusually tired/fatigued  Pain experienced in the last 7 days has been none  Patient states that she has experienced no weight loss or gain in last 6 months  Depression Screening:   PHQ-2 Score: 0      Fall Risk Screening: In the past year, patient has experienced: no history of falling in past year      Urinary Incontinence Screening:   Patient has not leaked urine accidently in the last six months  Home Safety:  Patient does not have trouble with stairs inside or outside of their home  Patient has working smoke alarms and has working carbon monoxide detector  Home safety hazards include: none  Nutrition:   Current diet is Regular  Medications:   Patient is currently taking over-the-counter supplements  OTC medications include: see medication list  Patient is able to manage medications       Activities of Daily Living (ADLs)/Instrumental Activities of Daily Living (IADLs):   Walk and transfer into and out of bed and chair?: Yes  Dress and groom yourself?: Yes    Bathe or shower yourself?: Yes    Feed yourself? Yes  Do your laundry/housekeeping?: Yes  Manage your money, pay your bills and track your expenses?: Yes  Make your own meals?: Yes    Do your own shopping?: Yes    Previous Hospitalizations:   Any hospitalizations or ED visits within the last 12 months?: No      Advance Care Planning:   Living will: Yes    Durable POA for healthcare: Yes    Advanced directive: Yes      PREVENTIVE SCREENINGS      Cardiovascular Screening:    General: Screening Not Indicated and History Lipid Disorder      Diabetes Screening:     General: Screening Current      Colorectal Cancer Screening:     General: Screening Not Indicated      Breast Cancer Screening:     General: Screening Not Indicated      Cervical Cancer Screening:    General: Screening Not Indicated      Lung Cancer Screening:     General: Screening Not Indicated    Screening, Brief Intervention, and Referral to Treatment (SBIRT)    Screening  Typical number of drinks in a day: 0  Typical number of drinks in a week: 0  Interpretation: Low risk drinking behavior      Single Item Drug Screening:  How often have you used an illegal drug (including marijuana) or a prescription medication for non-medical reasons in the past year? never    Single Item Drug Screen Score: 0  Interpretation: Negative screen for possible drug use disorder      Albertina Schneider MD

## 2021-05-07 NOTE — ASSESSMENT & PLAN NOTE
Hypertension    Patient blood pressure is stable at this time she will continue current regimen of medications

## 2021-05-07 NOTE — ASSESSMENT & PLAN NOTE
Hypothyroidism    Patient's latest TSH blood work was stable she will continue with current dose of levothyroxine

## 2021-05-07 NOTE — PATIENT INSTRUCTIONS
Medicare Preventive Visit Patient Instructions  Thank you for completing your Welcome to Medicare Visit or Medicare Annual Wellness Visit today  Your next wellness visit will be due in one year (5/8/2022)  The screening/preventive services that you may require over the next 5-10 years are detailed below  Some tests may not apply to you based off risk factors and/or age  Screening tests ordered at today's visit but not completed yet may show as past due  Also, please note that scanned in results may not display below  Preventive Screenings:  Service Recommendations Previous Testing/Comments   Colorectal Cancer Screening  * Colonoscopy    * Fecal Occult Blood Test (FOBT)/Fecal Immunochemical Test (FIT)  * Fecal DNA/Cologuard Test  * Flexible Sigmoidoscopy Age: 54-65 years old   Colonoscopy: every 10 years (may be performed more frequently if at higher risk)  OR  FOBT/FIT: every 1 year  OR  Cologuard: every 3 years  OR  Sigmoidoscopy: every 5 years  Screening may be recommended earlier than age 48 if at higher risk for colorectal cancer  Also, an individualized decision between you and your healthcare provider will decide whether screening between the ages of 74-80 would be appropriate  Colonoscopy: Not on file  FOBT/FIT: Not on file  Cologuard: Not on file  Sigmoidoscopy: Not on file          Breast Cancer Screening Age: 36 years old  Frequency: every 1-2 years  Not required if history of left and right mastectomy Mammogram: Not on file        Cervical Cancer Screening Between the ages of 21-29, pap smear recommended once every 3 years  Between the ages of 33-67, can perform pap smear with HPV co-testing every 5 years     Recommendations may differ for women with a history of total hysterectomy, cervical cancer, or abnormal pap smears in past  Pap Smear: Not on file    Screening Not Indicated   Hepatitis C Screening Once for adults born between [facility]  More frequently in patients at high risk for Hepatitis C Hep C Antibody: Not on file        Diabetes Screening 1-2 times per year if you're at risk for diabetes or have pre-diabetes Fasting glucose: 108 mg/dL   A1C: No results in last 5 years    Screening Current   Cholesterol Screening Once every 5 years if you don't have a lipid disorder  May order more often based on risk factors  Lipid panel: 11/04/2020    Screening Not Indicated  History Lipid Disorder     Other Preventive Screenings Covered by Medicare:  1  Abdominal Aortic Aneurysm (AAA) Screening: covered once if your at risk  You're considered to be at risk if you have a family history of AAA  2  Lung Cancer Screening: covers low dose CT scan once per year if you meet all of the following conditions: (1) Age 50-69; (2) No signs or symptoms of lung cancer; (3) Current smoker or have quit smoking within the last 15 years; (4) You have a tobacco smoking history of at least 30 pack years (packs per day multiplied by number of years you smoked); (5) You get a written order from a healthcare provider  3  Glaucoma Screening: covered annually if you're considered high risk: (1) You have diabetes OR (2) Family history of glaucoma OR (3)  aged 48 and older OR (3)  American aged 72 and older  3  Osteoporosis Screening: covered every 2 years if you meet one of the following conditions: (1) You're estrogen deficient and at risk for osteoporosis based off medical history and other findings; (2) Have a vertebral abnormality; (3) On glucocorticoid therapy for more than 3 months; (4) Have primary hyperparathyroidism; (5) On osteoporosis medications and need to assess response to drug therapy  · Last bone density test (DXA Scan): Not on file  5  HIV Screening: covered annually if you're between the age of 12-76  Also covered annually if you are younger than 13 and older than 72 with risk factors for HIV infection   For pregnant patients, it is covered up to 3 times per pregnancy  Immunizations:  Immunization Recommendations   Influenza Vaccine Annual influenza vaccination during flu season is recommended for all persons aged >= 6 months who do not have contraindications   Pneumococcal Vaccine (Prevnar and Pneumovax)  * Prevnar = PCV13  * Pneumovax = PPSV23   Adults 25-60 years old: 1-3 doses may be recommended based on certain risk factors  Adults 72 years old: Prevnar (PCV13) vaccine recommended followed by Pneumovax (PPSV23) vaccine  If already received PPSV23 since turning 65, then PCV13 recommended at least one year after PPSV23 dose  Hepatitis B Vaccine 3 dose series if at intermediate or high risk (ex: diabetes, end stage renal disease, liver disease)   Tetanus (Td) Vaccine - COST NOT COVERED BY MEDICARE PART B Following completion of primary series, a booster dose should be given every 10 years to maintain immunity against tetanus  Td may also be given as tetanus wound prophylaxis  Tdap Vaccine - COST NOT COVERED BY MEDICARE PART B Recommended at least once for all adults  For pregnant patients, recommended with each pregnancy  Shingles Vaccine (Shingrix) - COST NOT COVERED BY MEDICARE PART B  2 shot series recommended in those aged 48 and above     Health Maintenance Due:  There are no preventive care reminders to display for this patient  Immunizations Due:      Topic Date Due    COVID-19 Vaccine (1) Never done    DTaP,Tdap,and Td Vaccines (1 - Tdap) Never done    Pneumococcal Vaccine: 65+ Years (1 of 1 - PPSV23) Never done     Advance Directives   What are advance directives? Advance directives are legal documents that state your wishes and plans for medical care  These plans are made ahead of time in case you lose your ability to make decisions for yourself  Advance directives can apply to any medical decision, such as the treatments you want, and if you want to donate organs  What are the types of advance directives?   There are many types of advance directives, and each state has rules about how to use them  You may choose a combination of any of the following:  · Living will: This is a written record of the treatment you want  You can also choose which treatments you do not want, which to limit, and which to stop at a certain time  This includes surgery, medicine, IV fluid, and tube feedings  · Durable power of  for healthcare [facility]): This is a written record that states who you want to make healthcare choices for you when you are unable to make them for yourself  This person, called a proxy, is usually a family member or a friend  You may choose more than 1 proxy  · Do not resuscitate (DNR) order:  A DNR order is used in case your heart stops beating or you stop breathing  It is a request not to have certain forms of treatment, such as CPR  A DNR order may be included in other types of advance directives  · Medical directive: This covers the care that you want if you are in a coma, near death, or unable to make decisions for yourself  You can list the treatments you want for each condition  Treatment may include pain medicine, surgery, blood transfusions, dialysis, IV or tube feedings, and a ventilator (breathing machine)  · Values history: This document has questions about your views, beliefs, and how you feel and think about life  This information can help others choose the care that you would choose  Why are advance directives important? An advance directive helps you control your care  Although spoken wishes may be used, it is better to have your wishes written down  Spoken wishes can be misunderstood, or not followed  Treatments may be given even if you do not want them  An advance directive may make it easier for your family to make difficult choices about your care  © Copyright [employer] 2018 Information is for End User's use only and may not be sold, redistributed or otherwise used for commercial purposes   All illustrations and images included in CareNotes® are the copyrighted property of [employer]  or [place] [place]

## 2021-05-10 ENCOUNTER — TELEPHONE (OUTPATIENT)
Dept: FAMILY MEDICINE CLINIC | Facility: CLINIC | Age: 81
End: 2021-05-10

## 2021-05-10 DIAGNOSIS — I10 ESSENTIAL HYPERTENSION: Primary | ICD-10-CM

## 2021-05-10 RX ORDER — LOSARTAN POTASSIUM 25 MG/1
100 TABLET ORAL DAILY
Qty: 90 TABLET | Refills: 3 | Status: SHIPPED | OUTPATIENT
Start: 2021-05-10 | End: 2021-05-18 | Stop reason: SDUPTHER

## 2021-05-10 NOTE — TELEPHONE ENCOUNTER
----- Message from Vishal Barrera sent at 5/10/2021  3:17 PM EDT -----  Left detailed message to call office back for blood work results

## 2021-05-10 NOTE — TELEPHONE ENCOUNTER
Recent blood work shows low sodium level   This is more than likely from the water pill in her losartan medication   I would prefer patient take plain losartan and discontinue her potassium supplement since we are taking away hydrochlorothiazide diuretic from her [address] [place] [address] pressure medicine   I will send new prescription for plain losartan to pharmacy  LM for pt to call back

## 2021-05-11 NOTE — TELEPHONE ENCOUNTER
Called pt's leslihtWily  Spoke with her and made her aware of  MD's instructions  She verbalized understanding and had no questions at this time

## 2021-05-18 DIAGNOSIS — I10 ESSENTIAL HYPERTENSION: ICD-10-CM

## 2021-05-18 RX ORDER — LOSARTAN POTASSIUM 25 MG/1
100 TABLET ORAL DAILY
Qty: 90 TABLET | Refills: 3 | Status: SHIPPED | OUTPATIENT
Start: 2021-05-18 | End: 2021-05-18 | Stop reason: SDUPTHER

## 2021-05-18 RX ORDER — LOSARTAN POTASSIUM 100 MG/1
100 TABLET ORAL DAILY
Qty: 90 TABLET | Refills: 3 | Status: SHIPPED | OUTPATIENT
Start: 2021-05-18 | End: 2022-03-13

## 2021-05-18 NOTE — TELEPHONE ENCOUNTER
[facility] called, stated that the script needs to be changed to taking 1 tablet daily 100mgs, stated that insurance won't pay for it otherwise

## 2021-07-29 DIAGNOSIS — E03.9 HYPOTHYROIDISM, UNSPECIFIED TYPE: ICD-10-CM

## 2021-07-30 RX ORDER — LEVOTHYROXINE SODIUM 0.03 MG/1
TABLET ORAL
Qty: 90 TABLET | Refills: 1 | Status: SHIPPED | OUTPATIENT
Start: 2021-07-30 | End: 2022-01-04

## 2021-10-04 ENCOUNTER — OFFICE VISIT (OUTPATIENT)
Dept: CARDIOLOGY CLINIC | Facility: CLINIC | Age: 81
End: 2021-10-04
Payer: MEDICARE

## 2021-10-04 VITALS
WEIGHT: 118.2 LBS | HEART RATE: 61 BPM | BODY MASS INDEX: 23.83 KG/M2 | HEIGHT: 59 IN | DIASTOLIC BLOOD PRESSURE: 76 MMHG | SYSTOLIC BLOOD PRESSURE: 130 MMHG

## 2021-10-04 DIAGNOSIS — I10 PRIMARY HYPERTENSION: ICD-10-CM

## 2021-10-04 DIAGNOSIS — E78.2 MIXED HYPERLIPIDEMIA: ICD-10-CM

## 2021-10-04 DIAGNOSIS — I25.10 ARTERIOSCLEROSIS OF CORONARY ARTERY: ICD-10-CM

## 2021-10-04 DIAGNOSIS — I48.0 PAROXYSMAL ATRIAL FIBRILLATION (HCC): Primary | ICD-10-CM

## 2021-10-04 PROCEDURE — 93000 ELECTROCARDIOGRAM COMPLETE: CPT | Performed by: INTERNAL MEDICINE

## 2021-10-04 PROCEDURE — 99214 OFFICE O/P EST MOD 30 MIN: CPT | Performed by: INTERNAL MEDICINE

## 2021-10-04 RX ORDER — POTASSIUM CHLORIDE 20 MEQ/1
20 TABLET, EXTENDED RELEASE ORAL DAILY
COMMUNITY

## 2021-11-08 ENCOUNTER — OFFICE VISIT (OUTPATIENT)
Dept: FAMILY MEDICINE CLINIC | Facility: CLINIC | Age: 81
End: 2021-11-08
Payer: MEDICARE

## 2021-11-08 ENCOUNTER — TELEPHONE (OUTPATIENT)
Dept: FAMILY MEDICINE CLINIC | Facility: CLINIC | Age: 81
End: 2021-11-08

## 2021-11-08 ENCOUNTER — APPOINTMENT (OUTPATIENT)
Dept: LAB | Facility: CLINIC | Age: 81
End: 2021-11-08
Payer: MEDICARE

## 2021-11-08 VITALS
DIASTOLIC BLOOD PRESSURE: 80 MMHG | OXYGEN SATURATION: 99 % | HEART RATE: 67 BPM | SYSTOLIC BLOOD PRESSURE: 130 MMHG | BODY MASS INDEX: 24.19 KG/M2 | TEMPERATURE: 97.1 F | WEIGHT: 120 LBS | HEIGHT: 59 IN | RESPIRATION RATE: 16 BRPM

## 2021-11-08 DIAGNOSIS — I10 PRIMARY HYPERTENSION: Primary | ICD-10-CM

## 2021-11-08 DIAGNOSIS — E78.2 MIXED HYPERLIPIDEMIA: ICD-10-CM

## 2021-11-08 DIAGNOSIS — I25.10 ARTERIOSCLEROSIS OF CORONARY ARTERY: ICD-10-CM
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
## 2021-11-08 DIAGNOSIS — E03.9 HYPOTHYROIDISM, UNSPECIFIED TYPE: ICD-10-CM

## 2021-11-08 DIAGNOSIS — I10 PRIMARY HYPERTENSION: ICD-10-CM

## 2021-11-08 DIAGNOSIS — S80.262A TICK BITE OF LEFT KNEE, INITIAL ENCOUNTER: ICD-10-CM

## 2021-11-08 LAB
ALBUMIN SERPL BCP-MCNC: 3.9 G/DL (ref 3.5–5)
ALP SERPL-CCNC: 95 U/L (ref 46–116)
ALT SERPL W P-5'-P-CCNC: 19 U/L (ref 12–78)
ANION GAP SERPL CALCULATED.3IONS-SCNC: 4 MMOL/L (ref 4–13)
AST SERPL W P-5'-P-CCNC: 11 U/L (ref 5–45)
BILIRUB SERPL-MCNC: 0.78 MG/DL (ref 0.2–1)
BUN SERPL-MCNC: 14 MG/DL (ref 5–25)
CALCIUM SERPL-MCNC: 9.7 MG/DL (ref 8.3–10.1)
CHLORIDE SERPL-SCNC: 104 MMOL/L (ref 100–108)
CHOLEST SERPL-MCNC: 105 MG/DL (ref 50–200)
CO2 SERPL-SCNC: 27 MMOL/L (ref 21–32)
CREAT SERPL-MCNC: 0.82 MG/DL (ref 0.6–1.3)
GFR SERPL CREATININE-BSD FRML MDRD: 68 ML/MIN/1.73SQ M
GLUCOSE P FAST SERPL-MCNC: 99 MG/DL (ref 65–99)
HDLC SERPL-MCNC: 47 MG/DL
LDLC SERPL CALC-MCNC: 44 MG/DL (ref 0–100)
NONHDLC SERPL-MCNC: 58 MG/DL
POTASSIUM SERPL-SCNC: 4.5 MMOL/L (ref 3.5–5.3)
PROT SERPL-MCNC: 7.6 G/DL (ref 6.4–8.2)
SODIUM SERPL-SCNC: 135 MMOL/L (ref 136–145)
TRIGL SERPL-MCNC: 70 MG/DL

## 2021-11-08 PROCEDURE — 80053 COMPREHEN METABOLIC PANEL: CPT

## 2021-11-08 PROCEDURE — 36415 COLL VENOUS BLD VENIPUNCTURE: CPT

## 2021-11-08 PROCEDURE — 99214 OFFICE O/P EST MOD 30 MIN: CPT | Performed by: FAMILY MEDICINE

## 2021-11-08 PROCEDURE — 80061 LIPID PANEL: CPT | Performed by: INTERNAL MEDICINE

## 2021-11-08 RX ORDER — NITROGLYCERIN 0.4 MG/1
0.4 TABLET SUBLINGUAL
Qty: 10 TABLET | Refills: 1 | Status: SHIPPED | OUTPATIENT
Start: 2021-11-08

## 2021-11-11 ENCOUNTER — TELEPHONE (OUTPATIENT)
Dept: CARDIOLOGY CLINIC | Facility: CLINIC | Age: 81
End: 2021-11-11

## 2021-11-21 DIAGNOSIS — I48.0 PAROXYSMAL ATRIAL FIBRILLATION (HCC): ICD-10-CM

## 2021-11-21 DIAGNOSIS — E78.2 MIXED HYPERLIPIDEMIA: ICD-10-CM

## 2021-11-21 DIAGNOSIS — I10 ESSENTIAL HYPERTENSION: ICD-10-CM

## 2021-11-22 RX ORDER — METOPROLOL TARTRATE 50 MG/1
TABLET, FILM COATED ORAL
Qty: 180 TABLET | Refills: 2 | Status: SHIPPED | OUTPATIENT
Start: 2021-11-22 | End: 2022-06-28

## 2021-11-22 RX ORDER — ATORVASTATIN CALCIUM 40 MG/1
40 TABLET, FILM COATED ORAL
Qty: 90 TABLET | Refills: 2 | Status: SHIPPED | OUTPATIENT
Start: 2021-11-22 | End: 2022-06-28

## 2021-11-22 RX ORDER — AMLODIPINE BESYLATE 10 MG/1
TABLET ORAL
Qty: 90 TABLET | Refills: 2 | Status: SHIPPED | OUTPATIENT
Start: 2021-11-22 | End: 2022-06-28

## 2022-01-04 DIAGNOSIS — E03.9 HYPOTHYROIDISM, UNSPECIFIED TYPE: ICD-10-CM

## 2022-01-04 RX ORDER — LEVOTHYROXINE SODIUM 0.03 MG/1
TABLET ORAL
Qty: 90 TABLET | Refills: 1 | Status: SHIPPED | OUTPATIENT
Start: 2022-01-04 | End: 2022-06-28

## 2022-03-13 DIAGNOSIS — I10 ESSENTIAL HYPERTENSION: ICD-10-CM

## 2022-03-13 RX ORDER — LOSARTAN POTASSIUM 100 MG/1
TABLET ORAL
Qty: 90 TABLET | Refills: 3 | Status: SHIPPED | OUTPATIENT
Start: 2022-03-13

## 2022-04-18 ENCOUNTER — OFFICE VISIT (OUTPATIENT)
Dept: CARDIOLOGY CLINIC | Facility: CLINIC | Age: 82
End: 2022-04-18
Payer: MEDICARE

## 2022-04-18 VITALS
HEART RATE: 71 BPM | DIASTOLIC BLOOD PRESSURE: 70 MMHG | SYSTOLIC BLOOD PRESSURE: 106 MMHG | WEIGHT: 110.2 LBS | OXYGEN SATURATION: 98 % | HEIGHT: 59 IN | BODY MASS INDEX: 22.22 KG/M2

## 2022-04-18 DIAGNOSIS — I48.0 PAROXYSMAL ATRIAL FIBRILLATION (HCC): ICD-10-CM

## 2022-04-18 DIAGNOSIS — E78.2 MIXED HYPERLIPIDEMIA: ICD-10-CM

## 2022-04-18 DIAGNOSIS — I10 PRIMARY HYPERTENSION: Primary | ICD-10-CM

## 2022-04-18 DIAGNOSIS — I25.10 ARTERIOSCLEROSIS OF CORONARY ARTERY: ICD-10-CM

## 2022-04-18 PROCEDURE — 99214 OFFICE O/P EST MOD 30 MIN: CPT | Performed by: INTERNAL MEDICINE

## 2022-04-18 NOTE — PATIENT INSTRUCTIONS
Coronary Artery Disease   AMBULATORY CARE:   Coronary artery disease (CAD)  is narrowing of the arteries to your heart caused by a buildup of plaque  Plaque is made up of cholesterol and other substances  The narrowing in your arteries decreases the amount of blood that can flow to your heart  This causes your heart to get less oxygen, which may be life-threatening  You may not have any symptoms of CAD  It is important for you to manage CAD even if you feel well  CAD can lead to a heart attack if it is not managed  Common symptoms include the following:   · Chest pain (angina), causing burning, squeezing, or crushing tightness in your chest    · Pain that spreads to your neck, jaw, or shoulder blade    · Nausea, vomiting, sweating, fainting, and hands and feet that are cold to the touch    Call your local emergency number (911 in the [place]), or have someone call if:   · You have any of the following signs of a heart attack:      ? Squeezing, pressure, or pain in your chest    ? You may  also have any of the following:     § Discomfort or pain in your back, neck, jaw, stomach, or arm    § Shortness of breath    § Nausea or vomiting    § Lightheadedness or a sudden cold sweat      Seek care immediately if:   · You have chest pain that happens often  · You have chest pain at rest     Call your doctor if:   · You have questions or concerns about your condition or care  Medicines used to treat CAD:  You may  need any of the following:  · Blood pressure medicines  are given to lower your blood pressure  These medicines may include ACE inhibitors and beta-blockers  ACE inhibitors help keep your blood vessels relaxed and open  This helps keep blood flowing into your heart  Beta-blockers keep your heart pumping strongly and regularly  This helps keep your heart from working too hard to get oxygen  · Cholesterol medicines  help lower blood cholesterol levels      · Nitrates , such as nitroglycerin, relax the arteries of your heart so it gets more oxygen  Nitrates may also help relieve your chest pain  · Diuretics  help your body get rid of extra fluid and protect your heart from more damage  You may urinate more often while you are taking diuretics  · Antiplatelets , such as aspirin, help prevent blood clots  Take your antiplatelet medicine exactly as directed  These medicines make it more likely for you to bleed or bruise  If you are told to take aspirin, do not take acetaminophen or ibuprofen instead  · Blood thinners  keep clots from forming in your blood  Clots may cause heart attacks, strokes, or death  This medicine makes it more likely for you to bleed or bruise  · Do not take certain medicines without asking your healthcare provider first   These include NSAIDs, herbal or vitamin supplements, or hormones (estrogen or progestin)  Procedures used to treat CAD:   · An angioplasty  may be done to open an artery blocked by plaque  A tube with a balloon on the end is put into the blocked artery  When the tube is in the artery, the balloon is inflated  As the balloon inflates, it presses the plaque against the artery wall to open the artery  A stent may be placed in your artery to keep it open  · Coronary artery bypass surgery (CABG)  is open heart surgery  Healthcare providers take arteries or veins from other areas in your body  These are used to bypass (go around) the blocked arteries of your heart  Cardiac rehabilitation (rehab)  is a program run by specialists who will help you safely strengthen your heart and reduce the risk for more heart disease  The plan includes exercise, relaxation, stress management, and heart-healthy nutrition  Healthcare providers will also check to make sure any medicines you are taking are working  Manage CAD to prevent a heart attack:   · Do not smoke  Nicotine and other chemicals in cigarettes and cigars can cause heart and lung damage   Ask your healthcare provider for information if you currently smoke and need help to quit  E-cigarettes or smokeless tobacco still contain nicotine  Talk to your healthcare provider before you use these products  · Be physically active  Physical activity, such as exercise, can lower your blood pressure, cholesterol, weight, and blood sugar levels  Healthcare providers will help you create physical activity goals  They can also help you make a plan to reach your goals  For example, you can break activity into 10-minute periods, 3 times in the day  Find activities you enjoy  This will make it easier for you to reach your goals  · Maintain a healthy weight  If you are overweight, talk to your healthcare provider about how to lose weight  A weight loss of 10% can improve your heart health  · Eat heart healthy foods  Include fresh fruits and vegetables in your meal plan  Choose low-fat foods, such as skim or 1% fat milk, low-fat cheese and yogurt, fish, chicken (without skin), and lean meats  Eat two 4-ounce servings of fish high in omega-3 fats each week, such as salmon, fresh tuna, and herring  Avoid foods that are high in sodium, such as canned foods, potato chips, salty snacks, and cold cuts  Put less table salt on your food  · Limit or do not drink alcohol  A drink of alcohol is 12 ounces of beer, 5 ounces of wine, or 1½ ounces of liquor  Your healthcare provider can tell you how many drinks are okay within 24 hours and within 1 week  · Manage other health conditions  Follow your healthcare provider's advice on how to manage other conditions that can affect your heart health  These include diabetes, high blood pressure, and high cholesterol  You may need to take medicines for these conditions and make other lifestyle changes  · Manage stress  Stress can raise your blood pressure  Find new ways to relax, such as deep breathing or listening to music  · Ask about vaccines you may need  Vaccines help prevent certain diseases that can be dangerous for a person with CAD  Get a flu vaccine as soon as recommended each year, usually in September or October  You may also need vaccines to prevent pneumonia or COVID-19  Your healthcare provider can tell you if you should get other vaccines, and when to get them  Follow up with your doctor as directed: You may need to return for other tests  You may also be referred to a cardiac surgeon  Write down your questions so you remember to ask them during your visits  © Copyright [employer] 2022 Information is for End User's use only and may not be sold, redistributed or otherwise used for commercial purposes  All illustrations and images included in CareNotes® are the copyrighted property of [employer]  or [facility]   The above information is an  only  It is not intended as medical advice for individual conditions or treatments  Talk to your doctor, nurse or pharmacist before following any medical regimen to see if it is safe and effective for you

## 2022-04-18 NOTE — PROGRESS NOTES
Cardiology Follow Up    Gonzalez Rank  1940  [id number]     HEART & VASCULAR [facility]  [address] [place] [address] [address]    1  Primary hypertension     2  Arteriosclerosis of coronary artery     3  Mixed hyperlipidemia     4  Paroxysmal atrial fibrillation [facility])       Chief Complaint   Patient presents with    Follow-up     pt is here for 6 month f/u  no cardiac issues  Interval History: Patient feels well, without complaints  No reported chest pain, shortness of breath, palpitations, lightheadedness, syncope, LE edema, orthopnea, PND, or significant weight changes  Patient remains active without any increased fatigue out of the ordinary        Patient Active Problem List   Diagnosis    Arteriosclerosis of coronary artery    Hyperlipidemia    Hypertension    Esophageal reflux    Generalized anxiety disorder    Hypothyroidism    Well adult exam    Left lower quadrant pain    Paroxysmal atrial fibrillation ([address] )    Tick bite     Past Medical History:   Diagnosis Date    Coronary artery disease      Social History     Socioeconomic History    Marital status: Single     Spouse name: Not on file    Number of children: Not on file    Years of education: Not on file    Highest education level: Not on file   Occupational History    Not on file   Tobacco Use    Smoking status: Never Smoker    Smokeless tobacco: Never Used   Vaping Use    Vaping Use: Never used   Substance and Sexual Activity    Alcohol use: Not Currently    Drug use: No    Sexual activity: Not Currently   Other Topics Concern    Not on file   Social History Narrative    Daily coffee consumption (4 cups/day)     Social Determinants of Health     Financial Resource Strain: Not on file   Food Insecurity: Not on file   Transportation Needs: Not on file   Physical Activity: Not on file   Stress: Not on file   Social Connections: Not on file Intimate Partner Violence: Not on file   Housing Stability: Not on file      Family History   Problem Relation Age of Onset    Heart attack Mother         prior MI,  at the age of 80   Raman Montanez Coronary artery disease Father     Hypertension Father     Cancer Sister     Coronary artery disease Sister     Diabetes Sister     Hyperlipidemia Sister     Diabetes Brother      Past Surgical History:   Procedure Laterality Date    CARDIAC CATHETERIZATION      post cath with PCI to the proximal LAD with a xience stent 2013    CORONARY ANGIOPLASTY WITH STENT PLACEMENT         Current Outpatient Medications:     amLODIPine (NORVASC) 10 mg tablet, TAKE 1 TABLET EVERY DAY, Disp: 90 tablet, Rfl: 2    aspirin (ECOTRIN LOW STRENGTH) 81 mg EC tablet, Take 81 mg by mouth daily, Disp: , Rfl:     atorvastatin (LIPITOR) 40 mg tablet, TAKE 1 TABLET (40 MG TOTAL) BY MOUTH DAILY AT BEDTIME, Disp: 90 tablet, Rfl: 2    levothyroxine 25 mcg tablet, TAKE 1 TABLET EVERY DAY, Disp: 90 tablet, Rfl: 1    losartan (COZAAR) 100 MG tablet, TAKE 1 TABLET EVERY DAY, Disp: 90 tablet, Rfl: 3    metoprolol tartrate (LOPRESSOR) 50 mg tablet, TAKE 1 TABLET TWICE DAILY, Disp: 180 tablet, Rfl: 2    Multiple Vitamins-Minerals (PRESERVISION AREDS PO), Take 1 tablet by mouth daily  , Disp: , Rfl:     nitroglycerin (NITROSTAT) 0 4 mg SL tablet, Place 1 tablet (0 4 mg total) under the tongue every 5 (five) minutes as needed for chest pain, Disp: 10 tablet, Rfl: 1    potassium chloride (K-DUR,KLOR-CON) 20 mEq tablet, Take 20 mEq by mouth daily, Disp: , Rfl:   No Known Allergies    Labs:  Appointment on 2021   Component Date Value    Sodium 2021 135*    Potassium 2021 4 5     Chloride 2021 104     CO2 2021 27     ANION GAP 2021 4     BUN 2021 14     Creatinine 2021 0 82     Glucose, Fasting 2021 99     Calcium 2021 9 7     AST 2021 11     ALT 2021 19     Alkaline Phosphatase 11/08/2021 95     Total Protein 11/08/2021 7 6     Albumin 11/08/2021 3 9     Total Bilirubin 11/08/2021 0 78     eGFR 11/08/2021 68      Lab Results   Component Value Date    CHOL 99 10/26/2015    TRIG 70 11/08/2021    TRIG 64 10/26/2015    HDL 47 11/08/2021    HDL 40 10/26/2015     Imaging: No results found  Review of Systems:  Review of Systems   Constitutional: Negative for activity change, appetite change, chills, diaphoresis, fatigue and unexpected weight change  HENT: Negative for hearing loss, nosebleeds and sore throat  Eyes: Negative for photophobia and visual disturbance  Respiratory: Negative for cough, chest tightness, shortness of breath and wheezing  Cardiovascular: Negative for chest pain, palpitations and leg swelling  Gastrointestinal: Negative for abdominal pain, diarrhea, nausea and vomiting  Endocrine: Negative for polyuria  Genitourinary: Negative for dysuria, frequency and hematuria  Musculoskeletal: Negative for arthralgias, back pain, gait problem and neck pain  Skin: Negative for pallor and rash  Neurological: Negative for dizziness, syncope and headaches  Hematological: Does not bruise/bleed easily  Psychiatric/Behavioral: Negative for behavioral problems and confusion  Physical Exam:  Physical Exam  Vitals reviewed  Constitutional:       Appearance: She is well-developed  She is not diaphoretic  HENT:      Head: Normocephalic and atraumatic  Nose: Nose normal    Eyes:      General: No scleral icterus  Pupils: Pupils are equal, round, and reactive to light  Neck:      Vascular: No JVD  Cardiovascular:      Rate and Rhythm: Normal rate and regular rhythm  Heart sounds: Normal heart sounds  No murmur heard  No friction rub  No gallop  Pulmonary:      Effort: Pulmonary effort is normal  No respiratory distress  Breath sounds: Normal breath sounds  No wheezing or rales     Abdominal:      General: Bowel sounds are normal  There is no distension  Palpations: Abdomen is soft  Tenderness: There is no abdominal tenderness  Musculoskeletal:         General: No deformity  Normal range of motion  Cervical back: Normal range of motion and neck supple  Skin:     General: Skin is warm and dry  Findings: No rash  Neurological:      Mental Status: She is alert and oriented to person, place, and time  Cranial Nerves: No cranial nerve deficit  Psychiatric:         Behavior: Behavior normal      Blood pressure 106/70, pulse 71, height 4' 11" (1 499 m), weight 50 kg (110 lb 3 2 oz), SpO2 98 %  EKG:  Normal sinus rhythm  Possible lateral infarct, age undetermined  Abnormal ECG    Discussion/Summary:  1) CAD: s/p Xience stent Jan 2013, feels ok, compliant with medications  Heart healthy diet with increased fresh fruit and vegetables, lean proteins (chicken, fish, beans), whole grains (brown rice, whole wheat bread, whole wheat pasta), and reduce sugary desserts  Off Effient since it has been over 1 year of therapy since her PATRICK  Continue with baby ASA daily  Asymptomatic and continues to do well, no changes recommended to current regimen  Stable      2) HTN: BP better controlled, cough improved after lisinopril was stopped  Continue norvasc at 10mg daily along with losartan/HCTZ and metoprolol  No changes made today  Continue current regimen      3) Hyperlipidemia: LDL at goal, 39 in Oct 2018  Continue statin  LDL 49 in Oct 2019, 48 in May 2020, 45 in Nov 2020 - at goal   Recheck in Nov 2021 was 44     4) Paroxysmal afib: remote history, no recurrence  Continued on B-blocker, no AC necessary unless develops recurrence

## 2022-05-09 ENCOUNTER — APPOINTMENT (OUTPATIENT)
Dept: LAB | Facility: CLINIC | Age: 82
End: 2022-05-09
Payer: MEDICARE

## 2022-05-09 ENCOUNTER — OFFICE VISIT (OUTPATIENT)
Dept: FAMILY MEDICINE CLINIC | Facility: CLINIC | Age: 82
End: 2022-05-09
Payer: MEDICARE

## 2022-05-09 ENCOUNTER — TELEPHONE (OUTPATIENT)
Dept: FAMILY MEDICINE CLINIC | Facility: CLINIC | Age: 82
End: 2022-05-09

## 2022-05-09 VITALS
TEMPERATURE: 97.8 F | HEIGHT: 59 IN | SYSTOLIC BLOOD PRESSURE: 146 MMHG | BODY MASS INDEX: 22.01 KG/M2 | RESPIRATION RATE: 16 BRPM | WEIGHT: 109.2 LBS | OXYGEN SATURATION: 98 % | DIASTOLIC BLOOD PRESSURE: 82 MMHG | HEART RATE: 60 BPM

## 2022-05-09 DIAGNOSIS — I10 PRIMARY HYPERTENSION: ICD-10-CM

## 2022-05-09 DIAGNOSIS — E78.2 MIXED HYPERLIPIDEMIA: ICD-10-CM

## 2022-05-09 DIAGNOSIS — E03.9 HYPOTHYROIDISM, UNSPECIFIED TYPE: ICD-10-CM

## 2022-05-09 DIAGNOSIS — Z00.00 MEDICARE ANNUAL WELLNESS VISIT, SUBSEQUENT: Primary | ICD-10-CM

## 2022-05-09 LAB
ALBUMIN SERPL BCP-MCNC: 3.8 G/DL (ref 3.5–5)
ALP SERPL-CCNC: 124 U/L (ref 46–116)
ALT SERPL W P-5'-P-CCNC: 20 U/L (ref 12–78)
ANION GAP SERPL CALCULATED.3IONS-SCNC: 4 MMOL/L (ref 4–13)
AST SERPL W P-5'-P-CCNC: 15 U/L (ref 5–45)
BILIRUB SERPL-MCNC: 0.76 MG/DL (ref 0.2–1)
BUN SERPL-MCNC: 13 MG/DL (ref 5–25)
CALCIUM SERPL-MCNC: 9.7 MG/DL (ref 8.3–10.1)
CHLORIDE SERPL-SCNC: 104 MMOL/L (ref 100–108)
CHOLEST SERPL-MCNC: 113 MG/DL
CO2 SERPL-SCNC: 28 MMOL/L (ref 21–32)
CREAT SERPL-MCNC: 0.77 MG/DL (ref 0.6–1.3)
ERYTHROCYTE [DISTWIDTH] IN BLOOD BY AUTOMATED COUNT: 13.9 % (ref 11.6–15.1)
GFR SERPL CREATININE-BSD FRML MDRD: 72 ML/MIN/1.73SQ M
GLUCOSE P FAST SERPL-MCNC: 99 MG/DL (ref 65–99)
HCT VFR BLD AUTO: 42.8 % (ref 34.8–46.1)
HDLC SERPL-MCNC: 47 MG/DL
HGB BLD-MCNC: 14.4 G/DL (ref 11.5–15.4)
LDLC SERPL CALC-MCNC: 54 MG/DL (ref 0–100)
MCH RBC QN AUTO: 29.8 PG (ref 26.8–34.3)
MCHC RBC AUTO-ENTMCNC: 33.6 G/DL (ref 31.4–37.4)
MCV RBC AUTO: 88 FL (ref 82–98)
NONHDLC SERPL-MCNC: 66 MG/DL
PLATELET # BLD AUTO: 255 THOUSANDS/UL (ref 149–390)
PMV BLD AUTO: 10 FL (ref 8.9–12.7)
POTASSIUM SERPL-SCNC: 4.3 MMOL/L (ref 3.5–5.3)
PROT SERPL-MCNC: 7.4 G/DL (ref 6.4–8.2)
RBC # BLD AUTO: 4.84 MILLION/UL (ref 3.81–5.12)
SODIUM SERPL-SCNC: 136 MMOL/L (ref 136–145)
TRIGL SERPL-MCNC: 59 MG/DL
TSH SERPL DL<=0.05 MIU/L-ACNC: 1.72 UIU/ML (ref 0.45–4.5)
WBC # BLD AUTO: 7.6 THOUSAND/UL (ref 4.31–10.16)

## 2022-05-09 PROCEDURE — 36415 COLL VENOUS BLD VENIPUNCTURE: CPT

## 2022-05-09 PROCEDURE — 99213 OFFICE O/P EST LOW 20 MIN: CPT | Performed by: FAMILY MEDICINE

## 2022-05-09 PROCEDURE — 85027 COMPLETE CBC AUTOMATED: CPT

## 2022-05-09 PROCEDURE — 80053 COMPREHEN METABOLIC PANEL: CPT

## 2022-05-09 PROCEDURE — 84443 ASSAY THYROID STIM HORMONE: CPT

## 2022-05-09 PROCEDURE — G0439 PPPS, SUBSEQ VISIT: HCPCS | Performed by: FAMILY MEDICINE

## 2022-05-09 PROCEDURE — 80061 LIPID PANEL: CPT

## 2022-05-09 NOTE — TELEPHONE ENCOUNTER
----- Message from Ari Merchant MD sent at 7/9/3462 12:34 PM EDT -----  All recent blood work stable for patient

## 2022-05-09 NOTE — ASSESSMENT & PLAN NOTE
Hypertension  Patient blood pressure is stable at this time she will continue current regimen of medications  She will obtain blood work as ordered for further evaluation    We will make further recommendations pending results of test

## 2022-05-09 NOTE — PATIENT INSTRUCTIONS
Medicare Preventive Visit Patient Instructions  Thank you for completing your Welcome to Medicare Visit or Medicare Annual Wellness Visit today  Your next wellness visit will be due in one year (5/10/2023)  The screening/preventive services that you may require over the next 5-10 years are detailed below  Some tests may not apply to you based off risk factors and/or age  Screening tests ordered at today's visit but not completed yet may show as past due  Also, please note that scanned in results may not display below  Preventive Screenings:  Service Recommendations Previous Testing/Comments   Colorectal Cancer Screening  * Colonoscopy    * Fecal Occult Blood Test (FOBT)/Fecal Immunochemical Test (FIT)  * Fecal DNA/Cologuard Test  * Flexible Sigmoidoscopy Age: 54-65 years old   Colonoscopy: every 10 years (may be performed more frequently if at higher risk)  OR  FOBT/FIT: every 1 year  OR  Cologuard: every 3 years  OR  Sigmoidoscopy: every 5 years  Screening may be recommended earlier than age 48 if at higher risk for colorectal cancer  Also, an individualized decision between you and your healthcare provider will decide whether screening between the ages of 74-80 would be appropriate  Colonoscopy: Not on file  FOBT/FIT: Not on file  Cologuard: Not on file  Sigmoidoscopy: Not on file          Breast Cancer Screening Age: 36 years old  Frequency: every 1-2 years  Not required if history of left and right mastectomy Mammogram: Not on file        Cervical Cancer Screening Between the ages of 21-29, pap smear recommended once every 3 years  Between the ages of 33-67, can perform pap smear with HPV co-testing every 5 years     Recommendations may differ for women with a history of total hysterectomy, cervical cancer, or abnormal pap smears in past  Pap Smear: Not on file    Screening Not Indicated   Hepatitis C Screening Once for adults born between [facility]  More frequently in patients at high risk for Hepatitis C Hep C Antibody: Not on file        Diabetes Screening 1-2 times per year if you're at risk for diabetes or have pre-diabetes Fasting glucose: 99 mg/dL   A1C: No results in last 5 years    Screening Current   Cholesterol Screening Once every 5 years if you don't have a lipid disorder  May order more often based on risk factors  Lipid panel: 11/08/2021    Screening Not Indicated  History Lipid Disorder     Other Preventive Screenings Covered by Medicare:  1  Abdominal Aortic Aneurysm (AAA) Screening: covered once if your at risk  You're considered to be at risk if you have a family history of AAA  2  Lung Cancer Screening: covers low dose CT scan once per year if you meet all of the following conditions: (1) Age 50-69; (2) No signs or symptoms of lung cancer; (3) Current smoker or have quit smoking within the last 15 years; (4) You have a tobacco smoking history of at least 30 pack years (packs per day multiplied by number of years you smoked); (5) You get a written order from a healthcare provider  3  Glaucoma Screening: covered annually if you're considered high risk: (1) You have diabetes OR (2) Family history of glaucoma OR (3)  aged 48 and older OR (3)  American aged 72 and older  3  Osteoporosis Screening: covered every 2 years if you meet one of the following conditions: (1) You're estrogen deficient and at risk for osteoporosis based off medical history and other findings; (2) Have a vertebral abnormality; (3) On glucocorticoid therapy for more than 3 months; (4) Have primary hyperparathyroidism; (5) On osteoporosis medications and need to assess response to drug therapy  · Last bone density test (DXA Scan): Not on file  5  HIV Screening: covered annually if you're between the age of 12-76  Also covered annually if you are younger than 13 and older than 72 with risk factors for HIV infection   For pregnant patients, it is covered up to 3 times per pregnancy  Immunizations:  Immunization Recommendations   Influenza Vaccine Annual influenza vaccination during flu season is recommended for all persons aged >= 6 months who do not have contraindications   Pneumococcal Vaccine (Prevnar and Pneumovax)  * Prevnar = PCV13  * Pneumovax = PPSV23   Adults 25-60 years old: 1-3 doses may be recommended based on certain risk factors  Adults 72 years old: Prevnar (PCV13) vaccine recommended followed by Pneumovax (PPSV23) vaccine  If already received PPSV23 since turning 65, then PCV13 recommended at least one year after PPSV23 dose  Hepatitis B Vaccine 3 dose series if at intermediate or high risk (ex: diabetes, end stage renal disease, liver disease)   Tetanus (Td) Vaccine - COST NOT COVERED BY MEDICARE PART B Following completion of primary series, a booster dose should be given every 10 years to maintain immunity against tetanus  Td may also be given as tetanus wound prophylaxis  Tdap Vaccine - COST NOT COVERED BY MEDICARE PART B Recommended at least once for all adults  For pregnant patients, recommended with each pregnancy  Shingles Vaccine (Shingrix) - COST NOT COVERED BY MEDICARE PART B  2 shot series recommended in those aged 48 and above     Health Maintenance Due:  There are no preventive care reminders to display for this patient  Immunizations Due:      Topic Date Due    COVID-19 Vaccine (1) Never done    DTaP,Tdap,and Td Vaccines (1 - Tdap) Never done    Pneumococcal Vaccine: 65+ Years (1 of 1 - PPSV23) Never done     Advance Directives   What are advance directives? Advance directives are legal documents that state your wishes and plans for medical care  These plans are made ahead of time in case you lose your ability to make decisions for yourself  Advance directives can apply to any medical decision, such as the treatments you want, and if you want to donate organs  What are the types of advance directives?   There are many types of advance directives, and each state has rules about how to use them  You may choose a combination of any of the following:  · Living will: This is a written record of the treatment you want  You can also choose which treatments you do not want, which to limit, and which to stop at a certain time  This includes surgery, medicine, IV fluid, and tube feedings  · Durable power of  for healthcare [facility]): This is a written record that states who you want to make healthcare choices for you when you are unable to make them for yourself  This person, called a proxy, is usually a family member or a friend  You may choose more than 1 proxy  · Do not resuscitate (DNR) order:  A DNR order is used in case your heart stops beating or you stop breathing  It is a request not to have certain forms of treatment, such as CPR  A DNR order may be included in other types of advance directives  · Medical directive: This covers the care that you want if you are in a coma, near death, or unable to make decisions for yourself  You can list the treatments you want for each condition  Treatment may include pain medicine, surgery, blood transfusions, dialysis, IV or tube feedings, and a ventilator (breathing machine)  · Values history: This document has questions about your views, beliefs, and how you feel and think about life  This information can help others choose the care that you would choose  Why are advance directives important? An advance directive helps you control your care  Although spoken wishes may be used, it is better to have your wishes written down  Spoken wishes can be misunderstood, or not followed  Treatments may be given even if you do not want them  An advance directive may make it easier for your family to make difficult choices about your care  © Copyright [employer] 2018 Information is for End User's use only and may not be sold, redistributed or otherwise used for commercial purposes   All illustrations and images included in CareNotes® are the copyrighted property of [employer]  or [employer]

## 2022-05-09 NOTE — PROGRESS NOTES
FAMILY PRACTICE OFFICE VISIT       NAME: Latha Boateng  AGE: 80 y o  SEX: female       : 1940        MRN: [id number]    DATE: 2022  TIME: 8:26 AM    Assessment and Plan     Problem List Items Addressed This Visit        Endocrine    Hypothyroidism     Hypothyroidism  Patient will check TSH blood work and continue with current dose of levothyroxine         Relevant Orders    CBC    Comprehensive metabolic panel    Lipid panel    TSH, 3rd generation       Cardiovascular and Mediastinum    Hypertension     Hypertension  Patient blood pressure is stable at this time she will continue current regimen of medications  She will obtain blood work as ordered for further evaluation  We will make further recommendations pending results of test          Relevant Orders    CBC    Comprehensive metabolic panel    Lipid panel    TSH, 3rd generation       Other    Hyperlipidemia     Hyperlipidemia  Patient will check lipid panel blood work and continue with current dose of statin therapy         Relevant Orders    CBC    Comprehensive metabolic panel    Lipid panel    TSH, 3rd generation      Other Visit Diagnoses     Medicare annual wellness visit, subsequent    -  Primary              Chief Complaint     Chief Complaint   Patient presents with    Medicare Wellness Visit     AWV    Follow-up     Routine F/U Care: Hypothyroidism, HTN, HLD, HIWOT       History of Present Illness     Patient in the office to review chronic medical conditions  She denies any recent illness  She has chosen to not have COVID vaccination  She recently saw her cardiologist who felt she was in stable health  Patient's biggest complaint is of chronic intermittent low back pain from degenerative joint disease  She has been having intermittent discomfort since the fall of   She denies any recent injuries or falls  Usually Tylenol in heat seems to alleviate symptoms        Review of Systems   Review of Systems   Constitutional: Negative  HENT: Negative  Eyes: Negative  Respiratory: Negative  Cardiovascular: Negative  Gastrointestinal: Negative  Genitourinary: Negative  Musculoskeletal: Positive for arthralgias and back pain  Skin: Negative  Neurological: Negative  Psychiatric/Behavioral: Negative          Active Problem List     Patient Active Problem List   Diagnosis    Arteriosclerosis of coronary artery    Hyperlipidemia    Hypertension    Esophageal reflux    Generalized anxiety disorder    Hypothyroidism    Well adult exam    Left lower quadrant pain    Paroxysmal atrial fibrillation ([address] )    Tick bite       Past Medical History:  Past Medical History:   Diagnosis Date    Coronary artery disease        Past Surgical History:  Past Surgical History:   Procedure Laterality Date    CARDIAC CATHETERIZATION      post cath with PCI to the proximal LAD with a xience stent 2013    CORONARY ANGIOPLASTY WITH STENT PLACEMENT         Family History:  Family History   Problem Relation Age of Onset    Heart attack Mother         prior MI,  at the age of 80   Ricky Gzuman Coronary artery disease Father     Hypertension Father     Cancer Sister     Coronary artery disease Sister     Diabetes Sister     Hyperlipidemia Sister     Diabetes Brother        Social History:  Social History     Socioeconomic History    Marital status: Single     Spouse name: Not on file    Number of children: Not on file    Years of education: Not on file    Highest education level: Not on file   Occupational History    Not on file   Tobacco Use    Smoking status: Never Smoker    Smokeless tobacco: Never Used   Vaping Use    Vaping Use: Never used   Substance and Sexual Activity    Alcohol use: Not Currently    Drug use: No    Sexual activity: Not Currently   Other Topics Concern    Not on file   Social History Narrative    Daily coffee consumption (4 cups/day)     Social Determinants of Health     Financial Resource Strain: Not on file   Food Insecurity: Not on file   Transportation Needs: Not on file   Physical Activity: Not on file   Stress: Not on file   Social Connections: Not on file   Intimate Partner Violence: Not on file   Housing Stability: Not on file       Objective     Vitals:    05/09/22 0756   BP: 146/82   Pulse: 60   Resp: 16   Temp: 97 8 °F (36 6 °C)   SpO2: 98%     Wt Readings from Last 3 Encounters:   05/09/22 49 5 kg (109 lb 3 2 oz)   04/18/22 50 kg (110 lb 3 2 oz)   11/08/21 54 4 kg (120 lb)       Physical Exam  Constitutional:       General: She is not in acute distress  Appearance: Normal appearance  She is not ill-appearing  HENT:      Head: Normocephalic and atraumatic  Eyes:      General:         Right eye: No discharge  Left eye: No discharge  Extraocular Movements: Extraocular movements intact  Conjunctiva/sclera: Conjunctivae normal       Pupils: Pupils are equal, round, and reactive to light  Neck:      Vascular: No carotid bruit  Cardiovascular:      Rate and Rhythm: Normal rate and regular rhythm  Heart sounds: Normal heart sounds  No murmur heard  Pulmonary:      Effort: Pulmonary effort is normal       Breath sounds: Normal breath sounds  No wheezing, rhonchi or rales  Abdominal:      General: Abdomen is flat  Bowel sounds are normal  There is no distension  Palpations: Abdomen is soft  Tenderness: There is no abdominal tenderness  There is no guarding or rebound  Musculoskeletal:      Right lower leg: No edema  Left lower leg: No edema  Comments: Patient ambulates without assistive device  She does have 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  Muscle strength 5/5 lower extremities  Negative straight leg raising   Lymphadenopathy:      Cervical: No cervical adenopathy  Skin:     Findings: No rash  Neurological:      General: No focal deficit present        Mental Status: She is alert and oriented to person, place, and time  Cranial Nerves: No cranial nerve deficit  Psychiatric:         Mood and Affect: Mood normal          Behavior: Behavior normal          Thought Content:  Thought content normal          Judgment: Judgment normal          Pertinent Laboratory/Diagnostic Studies:  Lab Results   Component Value Date    GLUCOSE 97 10/26/2015    BUN 14 11/08/2021    CREATININE 0 82 11/08/2021    CALCIUM 9 7 11/08/2021     10/26/2015    K 4 5 11/08/2021    CO2 27 11/08/2021     11/08/2021     Lab Results   Component Value Date    ALT 19 11/08/2021    AST 11 11/08/2021    ALKPHOS 95 11/08/2021    BILITOT 0 93 10/26/2015       Lab Results   Component Value Date    WBC 6 62 11/04/2020    HGB 14 5 11/04/2020    HCT 44 9 11/04/2020    MCV 91 11/04/2020     11/04/2020       No results found for: TSH    Lab Results   Component Value Date    CHOL 99 10/26/2015     Lab Results   Component Value Date    TRIG 70 11/08/2021     Lab Results   Component Value Date    HDL 47 11/08/2021     Lab Results   Component Value Date    LDLCALC 44 11/08/2021     No results found for: HGBA1C    Results for orders placed or performed in visit on 11/08/21   Comprehensive metabolic panel   Result Value Ref Range    Sodium 135 (L) 136 - 145 mmol/L    Potassium 4 5 3 5 - 5 3 mmol/L    Chloride 104 100 - 108 mmol/L    CO2 27 21 - 32 mmol/L    ANION GAP 4 4 - 13 mmol/L    BUN 14 5 - 25 mg/dL    Creatinine 0 82 0 60 - 1 30 mg/dL    Glucose, Fasting 99 65 - 99 mg/dL    Calcium 9 7 8 3 - 10 1 mg/dL    AST 11 5 - 45 U/L    ALT 19 12 - 78 U/L    Alkaline Phosphatase 95 46 - 116 U/L    Total Protein 7 6 6 4 - 8 2 g/dL    Albumin 3 9 3 5 - 5 0 g/dL    Total Bilirubin 0 78 0 20 - 1 00 mg/dL    eGFR 68 ml/min/1 73sq m       Orders Placed This Encounter   Procedures    CBC    Comprehensive metabolic panel    Lipid panel    TSH, 3rd generation       ALLERGIES:  No Known Allergies    Current Medications     Current Outpatient Medications   Medication Sig Dispense Refill    amLODIPine (NORVASC) 10 mg tablet TAKE 1 TABLET EVERY DAY 90 tablet 2    aspirin (ECOTRIN LOW STRENGTH) 81 mg EC tablet Take 81 mg by mouth daily      atorvastatin (LIPITOR) 40 mg tablet TAKE 1 TABLET (40 MG TOTAL) BY MOUTH DAILY AT BEDTIME 90 tablet 2    levothyroxine 25 mcg tablet TAKE 1 TABLET EVERY DAY 90 tablet 1    losartan (COZAAR) 100 MG tablet TAKE 1 TABLET EVERY DAY 90 tablet 3    metoprolol tartrate (LOPRESSOR) 50 mg tablet TAKE 1 TABLET TWICE DAILY 180 tablet 2    Multiple Vitamins-Minerals (PRESERVISION AREDS PO) Take 1 tablet by mouth daily        nitroglycerin (NITROSTAT) 0 4 mg SL tablet Place 1 tablet (0 4 mg total) under the tongue every 5 (five) minutes as needed for chest pain (Patient not taking: Reported on 5/9/2022 ) 10 tablet 1    potassium chloride (K-DUR,KLOR-CON) 20 mEq tablet Take 20 mEq by mouth daily (Patient not taking: Reported on 5/9/2022 )       No current facility-administered medications for this visit  Health Maintenance     Health Maintenance   Topic Date Due    SLP PLAN OF CARE  Never done    DTaP,Tdap,and Td Vaccines (1 - Tdap) Never done    Osteoporosis Screening  Never done    Pneumococcal Vaccine: 65+ Years (1 of 1 - PPSV23) Never done   [facility] Annual Wellness Visit (AWV)  05/07/2022    COVID-19 Vaccine (1) 08/09/2022 (Originally 12/26/1945)    Influenza Vaccine (Season Ended) 09/01/2022    Fall Risk  05/09/2023    Depression Screening  05/09/2023    BMI: Adult  05/09/2023    HIB Vaccine  Aged Out    Hepatitis B Vaccine  Aged Out    IPV Vaccine  Aged Out    Hepatitis A Vaccine  Aged Out    Meningococcal ACWY Vaccine  Aged Out    HPV Vaccine  Aged Out       There is no immunization history on file for this patient      Malinda Stein MD

## 2022-05-09 NOTE — PROGRESS NOTES
Assessment and Plan:     Problem List Items Addressed This Visit     None           Preventive health issues were discussed with patient, and age appropriate screening tests were ordered as noted in patient's After Visit Summary  Personalized health advice and appropriate referrals for health education or preventive services given if needed, as noted in patient's After Visit Summary       History of Present Illness:     Patient presents for Medicare Annual Wellness visit    Patient Care Team:  Patricia Bustamante MD as PCP - MD Patricia Orourke MD     Problem List:     Patient Active Problem List   Diagnosis    Arteriosclerosis of coronary artery    Hyperlipidemia    Hypertension    Esophageal reflux    Generalized anxiety disorder    Hypothyroidism    Well adult exam    Left lower quadrant pain    Paroxysmal atrial fibrillation ([address] )    Tick bite      Past Medical and Surgical History:     Past Medical History:   Diagnosis Date    Coronary artery disease      Past Surgical History:   Procedure Laterality Date    CARDIAC CATHETERIZATION      post cath with PCI to the proximal LAD with a xience stent 2013    CORONARY ANGIOPLASTY WITH STENT PLACEMENT        Family History:     Family History   Problem Relation Age of Onset    Heart attack Mother         prior MI,  at the age of 80   Jazmyn Riggs Coronary artery disease Father     Hypertension Father     Cancer Sister     Coronary artery disease Sister     Diabetes Sister     Hyperlipidemia Sister     Diabetes Brother       Social History:     Social History     Socioeconomic History    Marital status: Single     Spouse name: None    Number of children: None    Years of education: None    Highest education level: None   Occupational History    None   Tobacco Use    Smoking status: Never Smoker    Smokeless tobacco: Never Used   Vaping Use    Vaping Use: Never used   Substance and Sexual Activity    Alcohol use: Not Currently  Drug use: No    Sexual activity: Not Currently   Other Topics Concern    None   Social History Narrative    Daily coffee consumption (4 cups/day)     Social Determinants of Health     Financial Resource Strain: Not on file   Food Insecurity: Not on file   Transportation Needs: Not on file   Physical Activity: Not on file   Stress: Not on file   Social Connections: Not on file   Intimate Partner Violence: Not on file   Housing Stability: Not on file      Medications and Allergies:     Current Outpatient Medications   Medication Sig Dispense Refill    amLODIPine (NORVASC) 10 mg tablet TAKE 1 TABLET EVERY DAY 90 tablet 2    aspirin (ECOTRIN LOW STRENGTH) 81 mg EC tablet Take 81 mg by mouth daily      atorvastatin (LIPITOR) 40 mg tablet TAKE 1 TABLET (40 MG TOTAL) BY MOUTH DAILY AT BEDTIME 90 tablet 2    levothyroxine 25 mcg tablet TAKE 1 TABLET EVERY DAY 90 tablet 1    losartan (COZAAR) 100 MG tablet TAKE 1 TABLET EVERY DAY 90 tablet 3    metoprolol tartrate (LOPRESSOR) 50 mg tablet TAKE 1 TABLET TWICE DAILY 180 tablet 2    Multiple Vitamins-Minerals (PRESERVISION AREDS PO) Take 1 tablet by mouth daily        nitroglycerin (NITROSTAT) 0 4 mg SL tablet Place 1 tablet (0 4 mg total) under the tongue every 5 (five) minutes as needed for chest pain (Patient not taking: Reported on 5/9/2022 ) 10 tablet 1    potassium chloride (K-DUR,KLOR-CON) 20 mEq tablet Take 20 mEq by mouth daily (Patient not taking: Reported on 5/9/2022 )       No current facility-administered medications for this visit  No Known Allergies   Immunizations: There is no immunization history on file for this patient  Health Maintenance: There are no preventive care reminders to display for this patient        Topic Date Due    COVID-19 Vaccine (1) Never done    DTaP,Tdap,and Td Vaccines (1 - Tdap) Never done    Pneumococcal Vaccine: 65+ Years (1 of 1 - PPSV23) Never done      Medicare Health Risk Assessment:     /82 (BP Location: Right arm, Patient Position: Sitting, Cuff Size: Standard)   Pulse 60   Temp 97 8 °F (36 6 °C) (Temporal)   Resp 16   Ht 4' 11" (1 499 m)   Wt 49 5 kg (109 lb 3 2 oz)   LMP  (LMP Unknown)   SpO2 98%   BMI 22 06 kg/m²      Aki Banda is here for her Subsequent Wellness visit  Health Risk Assessment:   Patient rates overall health as very good  Patient feels that their physical health rating is same  Patient is satisfied with their life  Eyesight was rated as same  Hearing was rated as same  Patient feels that their emotional and mental health rating is same  Patients states they are never, rarely angry  Patient states they are sometimes unusually tired/fatigued  Pain experienced in the last 7 days has been some  Patient's pain rating has been 4/10  Patient states that she has experienced no weight loss or gain in last 6 months  Depression Screening:   PHQ-2 Score: 0      Fall Risk Screening: In the past year, patient has experienced: no history of falling in past year      Urinary Incontinence Screening:   Patient has not leaked urine accidently in the last six months  Home Safety:  Patient has trouble with stairs inside or outside of their home  Patient has working smoke alarms and has working carbon monoxide detector  Home safety hazards include: none  Nutrition:   Current diet is Regular  Medications:   Patient is currently taking over-the-counter supplements  OTC medications include: see medication list  Patient is able to manage medications  Activities of Daily Living (ADLs)/Instrumental Activities of Daily Living (IADLs):   Walk and transfer into and out of bed and chair?: Yes  Dress and groom yourself?: Yes    Bathe or shower yourself?: Yes    Feed yourself?  Yes  Do your laundry/housekeeping?: Yes  Manage your money, pay your bills and track your expenses?: Yes  Make your own meals?: Yes    Do your own shopping?: Yes    Previous Hospitalizations:   Any hospitalizations or ED visits within the last 12 months?: No      Advance Care Planning:   Living will: Yes    Durable POA for healthcare: Yes    Advanced directive: Yes      Cognitive Screening:   Provider or family/friend/caregiver concerned regarding cognition?: No    PREVENTIVE SCREENINGS      Cardiovascular Screening:    General: History Lipid Disorder and Risks and Benefits Discussed    Due for: Lipid Panel      Diabetes Screening:     General: Screening Current and Risks and Benefits Discussed    Due for: Blood Glucose      Colorectal Cancer Screening:     General: Screening Not Indicated      Breast Cancer Screening:     General: Screening Not Indicated      Cervical Cancer Screening:    General: Screening Not Indicated      Lung Cancer Screening:     General: Screening Not Indicated    Screening, Brief Intervention, and Referral to Treatment (SBIRT)    Screening  Typical number of drinks in a day: 0  Typical number of drinks in a week: 0  Interpretation: Low risk drinking behavior      Single Item Drug Screening:  How often have you used an illegal drug (including marijuana) or a prescription medication for non-medical reasons in the past year? never    Single Item Drug Screen Score: 0  Interpretation: Negative screen for possible drug use disorder      Dorothea Barth MD

## 2022-06-28 DIAGNOSIS — I48.0 PAROXYSMAL ATRIAL FIBRILLATION (HCC): ICD-10-CM

## 2022-06-28 DIAGNOSIS — I10 ESSENTIAL HYPERTENSION: ICD-10-CM

## 2022-06-28 DIAGNOSIS — E78.2 MIXED HYPERLIPIDEMIA: ICD-10-CM

## 2022-06-28 DIAGNOSIS — E03.9 HYPOTHYROIDISM, UNSPECIFIED TYPE: ICD-10-CM

## 2022-06-28 RX ORDER — LEVOTHYROXINE SODIUM 0.03 MG/1
TABLET ORAL
Qty: 90 TABLET | Refills: 1 | Status: SHIPPED | OUTPATIENT
Start: 2022-06-28

## 2022-06-28 RX ORDER — ATORVASTATIN CALCIUM 40 MG/1
40 TABLET, FILM COATED ORAL
Qty: 90 TABLET | Refills: 2 | Status: SHIPPED | OUTPATIENT
Start: 2022-06-28

## 2022-06-28 RX ORDER — METOPROLOL TARTRATE 50 MG/1
TABLET, FILM COATED ORAL
Qty: 180 TABLET | Refills: 2 | Status: SHIPPED | OUTPATIENT
Start: 2022-06-28

## 2022-06-28 RX ORDER — AMLODIPINE BESYLATE 10 MG/1
TABLET ORAL
Qty: 90 TABLET | Refills: 2 | Status: SHIPPED | OUTPATIENT
Start: 2022-06-28

## 2022-10-14 ENCOUNTER — OFFICE VISIT (OUTPATIENT)
Dept: CARDIOLOGY CLINIC | Facility: CLINIC | Age: 82
End: 2022-10-14
Payer: MEDICARE

## 2022-10-14 VITALS
BODY MASS INDEX: 22.94 KG/M2 | SYSTOLIC BLOOD PRESSURE: 160 MMHG | DIASTOLIC BLOOD PRESSURE: 80 MMHG | HEART RATE: 59 BPM | HEIGHT: 59 IN | WEIGHT: 113.8 LBS

## 2022-10-14 DIAGNOSIS — I10 PRIMARY HYPERTENSION: ICD-10-CM

## 2022-10-14 DIAGNOSIS — I48.0 PAROXYSMAL ATRIAL FIBRILLATION (HCC): Primary | ICD-10-CM

## 2022-10-14 DIAGNOSIS — I25.10 ARTERIOSCLEROSIS OF CORONARY ARTERY: ICD-10-CM

## 2022-10-14 DIAGNOSIS — E78.2 MIXED HYPERLIPIDEMIA: ICD-10-CM

## 2022-10-14 PROCEDURE — 99214 OFFICE O/P EST MOD 30 MIN: CPT | Performed by: INTERNAL MEDICINE

## 2022-10-14 PROCEDURE — 93000 ELECTROCARDIOGRAM COMPLETE: CPT | Performed by: INTERNAL MEDICINE

## 2022-10-14 NOTE — PROGRESS NOTES
Cardiology Follow Up    Diana Ramirez  1940  [id number]     HEART & VASCULAR [facility]  [address] [place] [address] [address]    1  Paroxysmal atrial fibrillation (HCC)  POCT ECG   2  Primary hypertension     3  Arteriosclerosis of coronary artery     4  Mixed hyperlipidemia       Chief Complaint   Patient presents with   • Follow-up     No cardiac complaints       Interval History: Patient feels well, without complaints  No reported chest pain, shortness of breath, palpitations, lightheadedness, syncope, LE edema, orthopnea, PND, or significant weight changes  Patient remains active without any increased fatigue out of the ordinary          Patient Active Problem List   Diagnosis   • Arteriosclerosis of coronary artery   • Hyperlipidemia   • Hypertension   • Esophageal reflux   • Generalized anxiety disorder   • Hypothyroidism   • Well adult exam   • Left lower quadrant pain   • Paroxysmal atrial fibrillation (HCC)   • Tick bite     Past Medical History:   Diagnosis Date   • Coronary artery disease      Social History     Socioeconomic History   • Marital status: Single     Spouse name: Not on file   • Number of children: Not on file   • Years of education: Not on file   • Highest education level: Not on file   Occupational History   • Not on file   Tobacco Use   • Smoking status: Never Smoker   • Smokeless tobacco: Never Used   Vaping Use   • Vaping Use: Never used   Substance and Sexual Activity   • Alcohol use: Not Currently   • Drug use: No   • Sexual activity: Not Currently   Other Topics Concern   • Not on file   Social History Narrative    Daily coffee consumption (4 cups/day)     Social Determinants of Health     Financial Resource Strain: Not on file   Food Insecurity: Not on file   Transportation Needs: Not on file   Physical Activity: Not on file   Stress: Not on file   Social Connections: Not on file   Intimate Partner Violence: Not on file   Housing Stability: Not on file      Family History   Problem Relation Age of Onset   • Heart attack Mother         prior MI,  at the age of 80   • Coronary artery disease Father    • Hypertension Father    • Cancer Sister    • Coronary artery disease Sister    • Diabetes Sister    • Hyperlipidemia Sister    • Diabetes Brother      Past Surgical History:   Procedure Laterality Date   • CARDIAC CATHETERIZATION      post cath with PCI to the proximal LAD with a xience stent 2013   • CORONARY ANGIOPLASTY WITH STENT PLACEMENT         Current Outpatient Medications:   •  amLODIPine (NORVASC) 10 mg tablet, TAKE 1 TABLET EVERY DAY, Disp: 90 tablet, Rfl: 2  •  aspirin (ECOTRIN LOW STRENGTH) 81 mg EC tablet, Take 81 mg by mouth daily, Disp: , Rfl:   •  atorvastatin (LIPITOR) 40 mg tablet, TAKE 1 TABLET (40 MG TOTAL) BY MOUTH DAILY AT BEDTIME, Disp: 90 tablet, Rfl: 2  •  levothyroxine 25 mcg tablet, TAKE 1 TABLET EVERY DAY, Disp: 90 tablet, Rfl: 1  •  losartan (COZAAR) 100 MG tablet, TAKE 1 TABLET EVERY DAY, Disp: 90 tablet, Rfl: 3  •  metoprolol tartrate (LOPRESSOR) 50 mg tablet, TAKE 1 TABLET TWICE DAILY, Disp: 180 tablet, Rfl: 2  •  Multiple Vitamins-Minerals (PRESERVISION AREDS PO), Take 1 tablet by mouth daily  , Disp: , Rfl:   •  nitroglycerin (NITROSTAT) 0 4 mg SL tablet, Place 1 tablet (0 4 mg total) under the tongue every 5 (five) minutes as needed for chest pain (Patient not taking: No sig reported), Disp: 10 tablet, Rfl: 1  •  potassium chloride (K-DUR,KLOR-CON) 20 mEq tablet, Take 20 mEq by mouth daily (Patient not taking: No sig reported), Disp: , Rfl:   No Known Allergies    Labs:  Appointment on 2022   Component Date Value   • WBC 2022 7 60    • RBC 2022 4 84    • Hemoglobin 2022 14 4    • Hematocrit 2022 42 8    • MCV 2022 88    • MCH 2022 29 8    • MCHC 2022 33 6    • RDW 2022 13 9    • Platelets  255    • MPV 05/09/2022 10 0    • Sodium 05/09/2022 136    • Potassium 05/09/2022 4 3    • Chloride 05/09/2022 104    • CO2 05/09/2022 28    • ANION GAP 05/09/2022 4    • BUN 05/09/2022 13    • Creatinine 05/09/2022 0 77    • Glucose, Fasting 05/09/2022 99    • Calcium 05/09/2022 9 7    • AST 05/09/2022 15    • ALT 05/09/2022 20    • Alkaline Phosphatase 05/09/2022 124 (A)   • Total Protein 05/09/2022 7 4    • Albumin 05/09/2022 3 8    • Total Bilirubin 05/09/2022 0 76    • eGFR 05/09/2022 72    • Cholesterol 05/09/2022 113    • Triglycerides 05/09/2022 59    • HDL, Direct 05/09/2022 47 (A)   • LDL Calculated 05/09/2022 54    • Non-HDL-Chol (CHOL-HDL) 05/09/2022 66    • TSH 3RD GENERATON 05/09/2022 1 720      Lab Results   Component Value Date    CHOL 99 10/26/2015    TRIG 59 05/09/2022    TRIG 64 10/26/2015    HDL 47 (L) 05/09/2022    HDL 40 10/26/2015     Imaging: No results found  Review of Systems:  Review of Systems   Constitutional: Negative for activity change, appetite change, chills, diaphoresis, fatigue and unexpected weight change  HENT: Negative for hearing loss, nosebleeds and sore throat  Eyes: Negative for photophobia and visual disturbance  Respiratory: Negative for cough, chest tightness, shortness of breath and wheezing  Cardiovascular: Negative for chest pain, palpitations and leg swelling  Gastrointestinal: Negative for abdominal pain, diarrhea, nausea and vomiting  Endocrine: Negative for polyuria  Genitourinary: Negative for dysuria, frequency and hematuria  Musculoskeletal: Negative for arthralgias, back pain, gait problem and neck pain  Skin: Negative for pallor and rash  Neurological: Negative for dizziness, syncope and headaches  Hematological: Does not bruise/bleed easily  Psychiatric/Behavioral: Negative for behavioral problems and confusion  Physical Exam:  Physical Exam  Vitals reviewed  Constitutional:       Appearance: She is well-developed   She is not diaphoretic  HENT:      Head: Normocephalic and atraumatic  Nose: Nose normal    Eyes:      General: No scleral icterus  Pupils: Pupils are equal, round, and reactive to light  Neck:      Vascular: No JVD  Cardiovascular:      Rate and Rhythm: Normal rate and regular rhythm  Heart sounds: Normal heart sounds  No murmur heard  No friction rub  No gallop  Pulmonary:      Effort: Pulmonary effort is normal  No respiratory distress  Breath sounds: Normal breath sounds  No wheezing or rales  Abdominal:      General: Bowel sounds are normal  There is no distension  Palpations: Abdomen is soft  Tenderness: There is no abdominal tenderness  Musculoskeletal:         General: No deformity  Normal range of motion  Cervical back: Normal range of motion and neck supple  Skin:     General: Skin is warm and dry  Findings: No rash  Neurological:      Mental Status: She is alert and oriented to person, place, and time  Cranial Nerves: No cranial nerve deficit  Psychiatric:         Behavior: Behavior normal      Blood pressure 160/80, pulse 59, height 4' 11" (1 499 m), weight 51 6 kg (113 lb 12 8 oz)  EKG:  Sinus bradycardia  Lateral infarct, age undetermined  Abnormal ECG    Discussion/Summary:  1) CAD: s/p Xience stent Jan 2013, feels ok, compliant with medications  Heart healthy diet with increased fresh fruit and vegetables, lean proteins (chicken, fish, beans), whole grains (brown rice, whole wheat bread, whole wheat pasta), and reduce sugary desserts  Off Effient since it has been over 1 year of therapy since her PATRICK  Continue with baby ASA daily  Asymptomatic and continues to do well, no changes recommended to current regimen  Stable and tolerating current regimen well      2) HTN: BP high today, cough improved after lisinopril was stopped  Continue norvasc at 10mg daily along with losartan/HCTZ and metoprolol    BP usually much better controlled, continue to follow on current regimen for more elevated values     3) Hyperlipidemia: LDL at goal, 39 in Oct 2018  Continue statin  LDL 49 in Oct 2019, 48 in May 2020, 45 in Nov 2020 - at goal   Recheck in Nov 2021 was 40 and 47 in May 2022  4) Paroxysmal afib: remote history, no recurrence  Continued on B-blocker, no AC necessary unless develops recurrence  No symptoms to suggest this

## 2022-11-01 ENCOUNTER — RA CDI HCC (OUTPATIENT)
Dept: OTHER | Facility: HOSPITAL | Age: 82
End: 2022-11-01

## 2022-11-01 NOTE — PROGRESS NOTES
[address]  coding opportunities       Chart reviewed, no opportunity found: CHART REVIEWED, NO OPPORTUNITY FOUND        Patients Insurance     Medicare Insurance: Medicare

## 2022-11-09 ENCOUNTER — OFFICE VISIT (OUTPATIENT)
Dept: FAMILY MEDICINE CLINIC | Facility: CLINIC | Age: 82
End: 2022-11-09

## 2022-11-09 VITALS
BODY MASS INDEX: 22.58 KG/M2 | WEIGHT: 112 LBS | DIASTOLIC BLOOD PRESSURE: 70 MMHG | TEMPERATURE: 97.1 F | HEIGHT: 59 IN | RESPIRATION RATE: 16 BRPM | HEART RATE: 78 BPM | OXYGEN SATURATION: 96 % | SYSTOLIC BLOOD PRESSURE: 160 MMHG

## 2022-11-09 DIAGNOSIS — E78.2 MIXED HYPERLIPIDEMIA: ICD-10-CM

## 2022-11-09 DIAGNOSIS — G89.29 CHRONIC LEFT-SIDED LOW BACK PAIN WITHOUT SCIATICA: ICD-10-CM

## 2022-11-09 DIAGNOSIS — E03.9 HYPOTHYROIDISM, UNSPECIFIED TYPE: ICD-10-CM

## 2022-11-09 DIAGNOSIS — I10 PRIMARY HYPERTENSION: Primary | ICD-10-CM

## 2022-11-09 DIAGNOSIS — M54.50 CHRONIC LEFT-SIDED LOW BACK PAIN WITHOUT SCIATICA: ICD-10-CM
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
## 2022-11-09 NOTE — ASSESSMENT & PLAN NOTE
Hypertension  Blood pressure today is above goal   She will continue with current regimen of medications and follow up with cardiologist as ordered

## 2022-11-09 NOTE — ASSESSMENT & PLAN NOTE
Coronary artery disease  Patient is status post stenting of coronary artery  She denies any chest pain or shortness of breath at this time

## 2022-11-09 NOTE — PROGRESS NOTES
FAMILY PRACTICE OFFICE VISIT       NAME: Corey Boateng  AGE: 80 y o  SEX: female       : 1940        MRN: [id number]    DATE: 2022  TIME: 9:13 AM    Assessment and Plan     Problem List Items Addressed This Visit        Endocrine    Hypothyroidism     Hypothyroidism  TSH is stable on current dose of levothyroxine            Cardiovascular and Mediastinum    Hypertension - Primary     Hypertension  Blood pressure today is above goal   She will continue with current regimen of medications and follow up with cardiologist as ordered  Other    Hyperlipidemia     Hyperlipidemia  Lipid panel stable on current dose of statin therapy         Chronic left-sided low back pain without sciatica     Back pain  Patient may use extra strength Tylenol as per instructions  She may also apply heat to the affected area  I explained to patient that she appears to have osteoarthritis of lower back however if symptoms persist without improvement she will call the office for further consultation                   Chief Complaint     Chief Complaint   Patient presents with   • Follow-up     6 month        History of Present Illness     Patient in the office to review chronic medical condition  She denies any recent illness  Her biggest complaint is of some chronic intermittent low back pain when she is having to walk or stand for extended periods of time such as going shopping at a store  Symptoms are relieved when she eventually sits down  She denies any difficulties with sleeping at night  She denies any weakness of lower extremities  In the past she is tried Tylenol for symptomatic relief  I reviewed her most recent blood test results  Review of Systems   Review of Systems   Constitutional: Negative  HENT: Negative  Eyes: Negative  Respiratory: Negative  Cardiovascular: Negative  Gastrointestinal: Negative  Genitourinary: Negative      Musculoskeletal: Positive for arthralgias and back pain  Skin: Negative  Neurological: Negative  Psychiatric/Behavioral: Negative          Active Problem List     Patient Active Problem List   Diagnosis   • Arteriosclerosis of coronary artery   • Hyperlipidemia   • Hypertension   • Esophageal reflux   • Generalized anxiety disorder   • Hypothyroidism   • Well adult exam   • Left lower quadrant pain   • Paroxysmal atrial fibrillation (HCC)   • Chronic left-sided low back pain without sciatica       Past Medical History:  Past Medical History:   Diagnosis Date   • Coronary artery disease        Past Surgical History:  Past Surgical History:   Procedure Laterality Date   • CARDIAC CATHETERIZATION      post cath with PCI to the proximal LAD with a xience stent 2013   • CORONARY ANGIOPLASTY WITH STENT PLACEMENT         Family History:  Family History   Problem Relation Age of Onset   • Heart attack Mother         prior MI,  at the age of 80   • Coronary artery disease Father    • Hypertension Father    • Cancer Sister    • Coronary artery disease Sister    • Diabetes Sister    • Hyperlipidemia Sister    • Diabetes Brother        Social History:  Social History     Socioeconomic History   • Marital status: Single     Spouse name: Not on file   • Number of children: Not on file   • Years of education: Not on file   • Highest education level: Not on file   Occupational History   • Not on file   Tobacco Use   • Smoking status: Never Smoker   • Smokeless tobacco: Never Used   Vaping Use   • Vaping Use: Never used   Substance and Sexual Activity   • Alcohol use: Not Currently   • Drug use: No   • Sexual activity: Not Currently   Other Topics Concern   • Not on file   Social History Narrative    Daily coffee consumption (4 cups/day)     Social Determinants of Health     Financial Resource Strain: Not on file   Food Insecurity: Not on file   Transportation Needs: Not on file   Physical Activity: Not on file   Stress: Not on file   Social Connections: Not on file   Intimate Partner Violence: Not on file   Housing Stability: Not on file       Objective     Vitals:    11/09/22 0829   BP: 160/70   Pulse: 78   Resp: 16   Temp: (!) 97 1 °F (36 2 °C)   SpO2: 96%     Wt Readings from Last 3 Encounters:   11/09/22 50 8 kg (112 lb)   10/14/22 51 6 kg (113 lb 12 8 oz)   05/09/22 49 5 kg (109 lb 3 2 oz)       Physical Exam  Constitutional:       General: She is not in acute distress  Appearance: Normal appearance  She is not ill-appearing  HENT:      Head: Normocephalic and atraumatic  Eyes:      General:         Right eye: No discharge  Left eye: No discharge  Extraocular Movements: Extraocular movements intact  Conjunctiva/sclera: Conjunctivae normal       Pupils: Pupils are equal, round, and reactive to light  Neck:      Vascular: No carotid bruit  Cardiovascular:      Rate and Rhythm: Normal rate and regular rhythm  Heart sounds: Normal heart sounds  No murmur heard  Pulmonary:      Effort: Pulmonary effort is normal       Breath sounds: Normal breath sounds  No wheezing, rhonchi or rales  Musculoskeletal:      Right lower leg: No edema  Left lower leg: No edema  Comments: Negative for vertebral tenderness to palpation  Mild tenderness to palpation along the left paravertebral musculature  Mild decreased range of motion in all directions  Negative straight leg raising  Muscle strength 5/5 lower extremities  Lymphadenopathy:      Cervical: No cervical adenopathy  Skin:     Findings: No rash  Neurological:      General: No focal deficit present  Mental Status: She is alert and oriented to person, place, and time  Cranial Nerves: No cranial nerve deficit  Psychiatric:         Mood and Affect: Mood normal          Behavior: Behavior normal          Thought Content:  Thought content normal          Judgment: Judgment normal          Pertinent Laboratory/Diagnostic Studies:  Lab Results Component Value Date    GLUCOSE 97 10/26/2015    BUN 13 05/09/2022    CREATININE 0 77 05/09/2022    CALCIUM 9 7 05/09/2022     10/26/2015    K 4 3 05/09/2022    CO2 28 05/09/2022     05/09/2022     Lab Results   Component Value Date    ALT 20 05/09/2022    AST 15 05/09/2022    ALKPHOS 124 (H) 05/09/2022    BILITOT 0 93 10/26/2015       Lab Results   Component Value Date    WBC 7 60 05/09/2022    HGB 14 4 05/09/2022    HCT 42 8 05/09/2022    MCV 88 05/09/2022     05/09/2022       No results found for: TSH    Lab Results   Component Value Date    CHOL 99 10/26/2015     Lab Results   Component Value Date    TRIG 59 05/09/2022     Lab Results   Component Value Date    HDL 47 (L) 05/09/2022     Lab Results   Component Value Date    LDLCALC 54 05/09/2022     No results found for: HGBA1C    Results for orders placed or performed in visit on 05/09/22   CBC   Result Value Ref Range    WBC 7 60 4 31 - 10 16 Thousand/uL    RBC 4 84 3 81 - 5 12 Million/uL    Hemoglobin 14 4 11 5 - 15 4 g/dL    Hematocrit 42 8 34 8 - 46 1 %    MCV 88 82 - 98 fL    MCH 29 8 26 8 - 34 3 pg    MCHC 33 6 31 4 - 37 4 g/dL    RDW 13 9 11 6 - 15 1 %    Platelets 073 948 - 211 Thousands/uL    MPV 10 0 8 9 - 12 7 fL   Comprehensive metabolic panel   Result Value Ref Range    Sodium 136 136 - 145 mmol/L    Potassium 4 3 3 5 - 5 3 mmol/L    Chloride 104 100 - 108 mmol/L    CO2 28 21 - 32 mmol/L    ANION GAP 4 4 - 13 mmol/L    BUN 13 5 - 25 mg/dL    Creatinine 0 77 0 60 - 1 30 mg/dL    Glucose, Fasting 99 65 - 99 mg/dL    Calcium 9 7 8 3 - 10 1 mg/dL    AST 15 5 - 45 U/L    ALT 20 12 - 78 U/L    Alkaline Phosphatase 124 (H) 46 - 116 U/L    Total Protein 7 4 6 4 - 8 2 g/dL    Albumin 3 8 3 5 - 5 0 g/dL    Total Bilirubin 0 76 0 20 - 1 00 mg/dL    eGFR 72 ml/min/1 73sq m   Lipid panel   Result Value Ref Range    Cholesterol 113 See Comment mg/dL    Triglycerides 59 See Comment mg/dL    HDL, Direct 47 (L) >=50 mg/dL    LDL Calculated 54 0 - 100 mg/dL    Non-HDL-Chol (CHOL-HDL) 66 mg/dl   TSH, 3rd generation   Result Value Ref Range    TSH 3RD GENERATON 1 720 0 450 - 4 500 uIU/mL       No orders of the defined types were placed in this encounter  ALLERGIES:  No Known Allergies    Current Medications     Current Outpatient Medications   Medication Sig Dispense Refill   • amLODIPine (NORVASC) 10 mg tablet TAKE 1 TABLET EVERY DAY 90 tablet 2   • aspirin (ECOTRIN LOW STRENGTH) 81 mg EC tablet Take 81 mg by mouth daily     • atorvastatin (LIPITOR) 40 mg tablet TAKE 1 TABLET (40 MG TOTAL) BY MOUTH DAILY AT BEDTIME 90 tablet 2   • levothyroxine 25 mcg tablet TAKE 1 TABLET EVERY DAY 90 tablet 1   • losartan (COZAAR) 100 MG tablet TAKE 1 TABLET EVERY DAY 90 tablet 3   • metoprolol tartrate (LOPRESSOR) 50 mg tablet TAKE 1 TABLET TWICE DAILY 180 tablet 2   • Multiple Vitamins-Minerals (PRESERVISION AREDS PO) Take 1 tablet by mouth daily       • nitroglycerin (NITROSTAT) 0 4 mg SL tablet Place 1 tablet (0 4 mg total) under the tongue every 5 (five) minutes as needed for chest pain (Patient not taking: No sig reported) 10 tablet 1   • potassium chloride (K-DUR,KLOR-CON) 20 mEq tablet Take 20 mEq by mouth daily (Patient not taking: No sig reported)       No current facility-administered medications for this visit           Health Maintenance     Health Maintenance   Topic Date Due   • SLP PLAN OF CARE  Never done   • COVID-19 Vaccine (1) Never done   • Osteoporosis Screening  Never done   • Pneumococcal Vaccine: 65+ Years (1 - PCV) Never done   • Influenza Vaccine (1) Never done   • Fall Risk  05/09/2023   • Urinary Incontinence Screening  05/09/2023   • Medicare Annual Wellness Visit (AWV)  05/09/2023   • Depression Screening  11/09/2023   • BMI: Adult  11/09/2023   • HIB Vaccine  Aged Out   • Hepatitis B Vaccine  Aged Out   • IPV Vaccine  Aged Out   • Hepatitis A Vaccine  Aged Out   • Meningococcal ACWY Vaccine  Aged Out   • HPV Vaccine  Aged Out       There is no immunization history on file for this patient      Malinda Stein I spent 25 minutes with this patient which greater than 50% was spent counseling or reviewing chart

## 2022-11-09 NOTE — ASSESSMENT & PLAN NOTE
Back pain  Patient may use extra strength Tylenol as per instructions  She may also apply heat to the affected area    I explained to patient that she appears to have osteoarthritis of lower back however if symptoms persist without improvement she will call the office for further consultation

## 2023-03-24 DIAGNOSIS — E03.9 HYPOTHYROIDISM, UNSPECIFIED TYPE: ICD-10-CM

## 2023-03-24 RX ORDER — LEVOTHYROXINE SODIUM 0.03 MG/1
TABLET ORAL
Qty: 90 TABLET | Refills: 1 | Status: SHIPPED | OUTPATIENT
Start: 2023-03-24

## 2023-04-08 DIAGNOSIS — I10 ESSENTIAL HYPERTENSION: ICD-10-CM

## 2023-04-08 RX ORDER — LOSARTAN POTASSIUM 100 MG/1
TABLET ORAL
Qty: 90 TABLET | Refills: 3 | Status: SHIPPED | OUTPATIENT
Start: 2023-04-08

## 2023-05-04 ENCOUNTER — OFFICE VISIT (OUTPATIENT)
Dept: CARDIOLOGY CLINIC | Facility: CLINIC | Age: 83
End: 2023-05-04

## 2023-05-04 VITALS
OXYGEN SATURATION: 98 % | BODY MASS INDEX: 22.18 KG/M2 | DIASTOLIC BLOOD PRESSURE: 80 MMHG | SYSTOLIC BLOOD PRESSURE: 140 MMHG | HEIGHT: 59 IN | WEIGHT: 110 LBS | HEART RATE: 60 BPM

## 2023-05-04 DIAGNOSIS — E78.2 MIXED HYPERLIPIDEMIA: ICD-10-CM

## 2023-05-04 DIAGNOSIS — I10 PRIMARY HYPERTENSION: ICD-10-CM

## 2023-05-04 DIAGNOSIS — I25.10 ARTERIOSCLEROSIS OF CORONARY ARTERY: ICD-10-CM

## 2023-05-04 DIAGNOSIS — I48.0 PAROXYSMAL ATRIAL FIBRILLATION (HCC): Primary | ICD-10-CM

## 2023-05-04 NOTE — PROGRESS NOTES
Cardiology Follow Up    Nicholas Bedolla  1940  [id number]     HEART [facility] VASCULAR [facility]  [address] [place] [address] [address]    1  Paroxysmal atrial fibrillation (HCC)        2  Primary hypertension        3  Arteriosclerosis of coronary artery        4  Mixed hyperlipidemia          Chief Complaint   Patient presents with    Follow-up     6 month, no cardiac concerns      Interval History: Patient feels well, without complaints  No reported chest pain, shortness of breath, palpitations, lightheadedness, syncope, LE edema, orthopnea, PND, or significant weight changes  Patient remains active without any increased fatigue out of the ordinary         Patient Active Problem List   Diagnosis    Arteriosclerosis of coronary artery    Hyperlipidemia    Hypertension    Esophageal reflux    Generalized anxiety disorder    Hypothyroidism    Well adult exam    Left lower quadrant pain    Paroxysmal atrial fibrillation (HCC)    Chronic left-sided low back pain without sciatica     Past Medical History:   Diagnosis Date    Coronary artery disease      Social History     Socioeconomic History    Marital status: Single     Spouse name: Not on file    Number of children: Not on file    Years of education: Not on file    Highest education level: Not on file   Occupational History    Not on file   Tobacco Use    Smoking status: Never    Smokeless tobacco: Never   Vaping Use    Vaping Use: Never used   Substance and Sexual Activity    Alcohol use: Not Currently    Drug use: No    Sexual activity: Not Currently   Other Topics Concern    Not on file   Social History Narrative    Daily coffee consumption (4 cups/day)     Social Determinants of Health     Financial Resource Strain: Not on file   Food Insecurity: Not on file   Transportation Needs: Not on file   Physical Activity: Not on file   Stress: Not on file   Social Connections: Not on file   Intimate Partner Violence: Not on file   Housing Stability: Not on file      Family History   Problem Relation Age of Onset    Heart attack Mother         prior MI,  at the age of 80   Nataly Bell Coronary artery disease Father     Hypertension Father     Cancer Sister     Coronary artery disease Sister     Diabetes Sister     Hyperlipidemia Sister     Diabetes Brother      Past Surgical History:   Procedure Laterality Date    CARDIAC CATHETERIZATION      post cath with PCI to the proximal LAD with a xience stent 2013    CORONARY ANGIOPLASTY WITH STENT PLACEMENT         Current Outpatient Medications:     amLODIPine (NORVASC) 10 mg tablet, TAKE 1 TABLET EVERY DAY, Disp: 90 tablet, Rfl: 2    aspirin (ECOTRIN LOW STRENGTH) 81 mg EC tablet, Take 81 mg by mouth daily, Disp: , Rfl:     atorvastatin (LIPITOR) 40 mg tablet, TAKE 1 TABLET (40 MG TOTAL) BY MOUTH DAILY AT BEDTIME, Disp: 90 tablet, Rfl: 2    levothyroxine 25 mcg tablet, TAKE 1 TABLET EVERY DAY, Disp: 90 tablet, Rfl: 1    losartan (COZAAR) 100 MG tablet, TAKE 1 TABLET EVERY DAY, Disp: 90 tablet, Rfl: 3    metoprolol tartrate (LOPRESSOR) 50 mg tablet, TAKE 1 TABLET TWICE DAILY, Disp: 180 tablet, Rfl: 2    Multiple Vitamins-Minerals (PRESERVISION AREDS PO), Take 1 tablet by mouth daily  , Disp: , Rfl:     nitroglycerin (NITROSTAT) 0 4 mg SL tablet, Place 1 tablet (0 4 mg total) under the tongue every 5 (five) minutes as needed for chest pain (Patient not taking: Reported on 2022), Disp: 10 tablet, Rfl: 1    potassium chloride (K-DUR,KLOR-CON) 20 mEq tablet, Take 20 mEq by mouth daily (Patient not taking: Reported on 2022), Disp: , Rfl:   No Known Allergies    Labs:  No visits with results within 6 Month(s) from this visit     Latest known visit with results is:   Appointment on 2022   Component Date Value    WBC 2022 7 60     RBC 2022 4 84     Hemoglobin 2022 14 4     Hematocrit 05/09/2022 42 8     MCV 05/09/2022 88     MCH 05/09/2022 29 8     MCHC 05/09/2022 33 6     RDW 05/09/2022 13 9     Platelets 26/88/3622 255     MPV 05/09/2022 10 0     Sodium 05/09/2022 136     Potassium 05/09/2022 4 3     Chloride 05/09/2022 104     CO2 05/09/2022 28     ANION GAP 05/09/2022 4     BUN 05/09/2022 13     Creatinine 05/09/2022 0 77     Glucose, Fasting 05/09/2022 99     Calcium 05/09/2022 9 7     AST 05/09/2022 15     ALT 05/09/2022 20     Alkaline Phosphatase 05/09/2022 124 (H)     Total Protein 05/09/2022 7 4     Albumin 05/09/2022 3 8     Total Bilirubin 05/09/2022 0 76     eGFR 05/09/2022 72     Cholesterol 05/09/2022 113     Triglycerides 05/09/2022 59     HDL, Direct 05/09/2022 47 (L)     LDL Calculated 05/09/2022 54     Non-HDL-Chol (CHOL-HDL) 05/09/2022 66     TSH 3RD GENERATON 05/09/2022 1 720      Lab Results   Component Value Date    CHOL 99 10/26/2015    TRIG 59 05/09/2022    TRIG 64 10/26/2015    HDL 47 (L) 05/09/2022    HDL 40 10/26/2015     Imaging: No results found  Review of Systems:  Review of Systems   Constitutional: Negative for activity change, appetite change, chills, diaphoresis, fatigue and unexpected weight change  HENT: Negative for hearing loss, nosebleeds and sore throat  Eyes: Negative for photophobia and visual disturbance  Respiratory: Negative for cough, chest tightness, shortness of breath and wheezing  Cardiovascular: Negative for chest pain, palpitations and leg swelling  Gastrointestinal: Negative for abdominal pain, diarrhea, nausea and vomiting  Endocrine: Negative for polyuria  Genitourinary: Negative for dysuria, frequency and hematuria  Musculoskeletal: Negative for arthralgias, back pain, gait problem and neck pain  Skin: Negative for pallor and rash  Neurological: Negative for dizziness, syncope and headaches  Hematological: Does not bruise/bleed easily     Psychiatric/Behavioral: Negative for behavioral "problems and confusion  Physical Exam:  Physical Exam  Vitals reviewed  Constitutional:       Appearance: She is well-developed  She is not diaphoretic  HENT:      Head: Normocephalic and atraumatic  Nose: Nose normal    Eyes:      General: No scleral icterus  Pupils: Pupils are equal, round, and reactive to light  Neck:      Vascular: No JVD  Cardiovascular:      Rate and Rhythm: Normal rate and regular rhythm  Heart sounds: Normal heart sounds  No murmur heard  No friction rub  No gallop  Pulmonary:      Effort: Pulmonary effort is normal  No respiratory distress  Breath sounds: Normal breath sounds  No wheezing or rales  Abdominal:      General: Bowel sounds are normal  There is no distension  Palpations: Abdomen is soft  Tenderness: There is no abdominal tenderness  Musculoskeletal:         General: No deformity  Normal range of motion  Cervical back: Normal range of motion and neck supple  Skin:     General: Skin is warm and dry  Findings: No rash  Neurological:      Mental Status: She is alert and oriented to person, place, and time  Cranial Nerves: No cranial nerve deficit  Psychiatric:         Behavior: Behavior normal      Blood pressure 140/80, pulse 60, height 4' 11\" (1 499 m), weight 49 9 kg (110 lb), SpO2 98 %  EKG:  Sinus bradycardia  Lateral infarct, age undetermined  Abnormal ECG    Discussion/Summary:  1) CAD: s/p Xience stent Jan 2013, feels ok, compliant with medications  Heart healthy diet with increased fresh fruit and vegetables, lean proteins (chicken, fish, beans), whole grains (brown rice, whole wheat bread, whole wheat pasta), and reduce sugary desserts  Off Effient since it has been over 1 year of therapy since her PATRICK  Continue with baby ASA daily  Remains asymptomatic, no changes made today     2) HTN: BP well controlled today, cough improved after lisinopril was stopped   Continue norvasc at 10mg daily along " with losartan/HCTZ and metoprolol  Well controlled, no changes made today     3) Hyperlipidemia: LDL at goal, 39 in Oct 2018  Continue statin  LDL 49 in Oct 2019, 48 in May 2020, 45 in Nov 2020 - at goal   Recheck in Nov 2021 was 40 and 47 in May 2022  Repeat lipids now  4) Paroxysmal afib: remote history, no recurrence  Continued on B-blocker, no AC necessary unless develops recurrence  No symptoms to suggest this, continue stable regimen of B-blocker

## 2023-05-10 ENCOUNTER — OFFICE VISIT (OUTPATIENT)
Dept: FAMILY MEDICINE CLINIC | Facility: CLINIC | Age: 83
End: 2023-05-10

## 2023-05-10 ENCOUNTER — APPOINTMENT (OUTPATIENT)
Dept: LAB | Facility: CLINIC | Age: 83
End: 2023-05-10

## 2023-05-10 VITALS
DIASTOLIC BLOOD PRESSURE: 70 MMHG | HEART RATE: 62 BPM | TEMPERATURE: 97.1 F | HEIGHT: 59 IN | SYSTOLIC BLOOD PRESSURE: 162 MMHG | OXYGEN SATURATION: 99 % | BODY MASS INDEX: 22.25 KG/M2 | WEIGHT: 110.38 LBS | RESPIRATION RATE: 18 BRPM

## 2023-05-10 DIAGNOSIS — E03.9 HYPOTHYROIDISM, UNSPECIFIED TYPE: ICD-10-CM

## 2023-05-10 DIAGNOSIS — I10 ESSENTIAL HYPERTENSION: ICD-10-CM

## 2023-05-10 DIAGNOSIS — R30.0 DYSURIA: Primary | ICD-10-CM

## 2023-05-10 DIAGNOSIS — E78.2 MIXED HYPERLIPIDEMIA: ICD-10-CM

## 2023-05-10 DIAGNOSIS — I10 PRIMARY HYPERTENSION: ICD-10-CM

## 2023-05-10 DIAGNOSIS — H61.21 IMPACTED CERUMEN OF RIGHT EAR: ICD-10-CM

## 2023-05-10 LAB
ALBUMIN SERPL BCP-MCNC: 4 G/DL (ref 3.5–5)
ALP SERPL-CCNC: 98 U/L (ref 46–116)
ALT SERPL W P-5'-P-CCNC: 19 U/L (ref 12–78)
ANION GAP SERPL CALCULATED.3IONS-SCNC: 4 MMOL/L (ref 4–13)
AST SERPL W P-5'-P-CCNC: 13 U/L (ref 5–45)
BILIRUB SERPL-MCNC: 0.65 MG/DL (ref 0.2–1)
BUN SERPL-MCNC: 13 MG/DL (ref 5–25)
CALCIUM SERPL-MCNC: 9.4 MG/DL (ref 8.3–10.1)
CHLORIDE SERPL-SCNC: 104 MMOL/L (ref 96–108)
CHOLEST SERPL-MCNC: 123 MG/DL
CO2 SERPL-SCNC: 27 MMOL/L (ref 21–32)
CREAT SERPL-MCNC: 0.69 MG/DL (ref 0.6–1.3)
ERYTHROCYTE [DISTWIDTH] IN BLOOD BY AUTOMATED COUNT: 12.9 % (ref 11.6–15.1)
GFR SERPL CREATININE-BSD FRML MDRD: 81 ML/MIN/1.73SQ M
GLUCOSE P FAST SERPL-MCNC: 103 MG/DL (ref 65–99)
HCT VFR BLD AUTO: 45.9 % (ref 34.8–46.1)
HDLC SERPL-MCNC: 50 MG/DL
HGB BLD-MCNC: 14.9 G/DL (ref 11.5–15.4)
LDLC SERPL CALC-MCNC: 59 MG/DL (ref 0–100)
MCH RBC QN AUTO: 29.4 PG (ref 26.8–34.3)
MCHC RBC AUTO-ENTMCNC: 32.5 G/DL (ref 31.4–37.4)
MCV RBC AUTO: 91 FL (ref 82–98)
NONHDLC SERPL-MCNC: 73 MG/DL
PLATELET # BLD AUTO: 243 THOUSANDS/UL (ref 149–390)
PMV BLD AUTO: 10.8 FL (ref 8.9–12.7)
POTASSIUM SERPL-SCNC: 3.8 MMOL/L (ref 3.5–5.3)
PROT SERPL-MCNC: 7.5 G/DL (ref 6.4–8.4)
RBC # BLD AUTO: 5.06 MILLION/UL (ref 3.81–5.12)
SODIUM SERPL-SCNC: 135 MMOL/L (ref 135–147)
TRIGL SERPL-MCNC: 69 MG/DL
TSH SERPL DL<=0.05 MIU/L-ACNC: 2.62 UIU/ML (ref 0.45–4.5)
WBC # BLD AUTO: 8.2 THOUSAND/UL (ref 4.31–10.16)

## 2023-05-10 NOTE — ASSESSMENT & PLAN NOTE
Cerumen impaction  Large amount of wax removed from right ear canal using irrigation with peroxide and water as well as curette

## 2023-05-10 NOTE — PROGRESS NOTES
Assessment and Plan:     Problem List Items Addressed This Visit        Endocrine    Hypothyroidism     Hypothyroidism  Patient will check TSH blood work and continue her current dose of levothyroxine         Relevant Orders    CBC    Comprehensive metabolic panel    Lipid panel    TSH, 3rd generation       Cardiovascular and Mediastinum    Hypertension     Hypertension  The patient's blood pressure is stable at this time and he will continue current regimen of medications         Essential hypertension    Relevant Orders    CBC    Comprehensive metabolic panel    Lipid panel    TSH, 3rd generation       Nervous and Auditory    Impacted cerumen of right ear     Cerumen impaction  Large amount of wax removed from right ear canal using irrigation with peroxide and water as well as curette  Relevant Orders    Ear cerumen removal       Other    Hyperlipidemia     Hyperlipidemia  Lipid panel is stable on current dose of statin therapy        Other Visit Diagnoses     Dysuria    -  Primary    Relevant Orders    Urine culture           Preventive health issues were discussed with patient, and age appropriate screening tests were ordered as noted in patient's After Visit Summary  Personalized health advice and appropriate referrals for health education or preventive services given if needed, as noted in patient's After Visit Summary  History of Present Illness:     Patient presents for a Medicare Wellness Visit    Patient in the office to review chronic medical condition  She denies any recent illness  She denies any chest pain or shortness of breath  Her main complaint today is of some decreased hearing of her right ear  Patient Care Team:  Kaden Keller MD as PCP - General  MD aKden Tian MD     Review of Systems:     Review of Systems   Constitutional: Negative  HENT: Positive for hearing loss  Eyes: Negative  Respiratory: Negative  Cardiovascular: Negative  Gastrointestinal: Negative  Genitourinary: Negative  Musculoskeletal: Negative  Skin: Negative  Neurological: Negative  Psychiatric/Behavioral: Negative           Problem List:     Patient Active Problem List   Diagnosis   • Arteriosclerosis of coronary artery   • Hyperlipidemia   • Hypertension   • Esophageal reflux   • Generalized anxiety disorder   • Hypothyroidism   • Well adult exam   • Left lower quadrant pain   • Paroxysmal atrial fibrillation (HCC)   • Chronic left-sided low back pain without sciatica   • Essential hypertension   • Impacted cerumen of right ear      Past Medical and Surgical History:     Past Medical History:   Diagnosis Date   • Coronary artery disease      Past Surgical History:   Procedure Laterality Date   • CARDIAC CATHETERIZATION      post cath with PCI to the proximal LAD with a xience stent 2013   • CORONARY ANGIOPLASTY WITH STENT PLACEMENT        Family History:     Family History   Problem Relation Age of Onset   • Heart attack Mother         prior MI,  at the age of 80   • Coronary artery disease Father    • Hypertension Father    • Cancer Sister    • Coronary artery disease Sister    • Diabetes Sister    • Hyperlipidemia Sister    • Diabetes Brother       Social History:     Social History     Socioeconomic History   • Marital status: Single     Spouse name: None   • Number of children: None   • Years of education: None   • Highest education level: None   Occupational History   • None   Tobacco Use   • Smoking status: Never   • Smokeless tobacco: Never   Vaping Use   • Vaping Use: Never used   Substance and Sexual Activity   • Alcohol use: Not Currently   • Drug use: No   • Sexual activity: Not Currently   Other Topics Concern   • None   Social History Narrative    Daily coffee consumption (4 cups/day)     Social Determinants of Health     Financial Resource Strain: Low Risk    • Difficulty of Paying Living Expenses: Not very hard   Food Insecurity: Not on file   Transportation Needs: No Transportation Needs   • Lack of Transportation (Medical): No   • Lack of Transportation (Non-Medical): No   Physical Activity: Not on file   Stress: Not on file   Social Connections: Not on file   Intimate Partner Violence: Not on file   Housing Stability: Not on file      Medications and Allergies:     Current Outpatient Medications   Medication Sig Dispense Refill   • amLODIPine (NORVASC) 10 mg tablet TAKE 1 TABLET EVERY DAY 90 tablet 2   • aspirin (ECOTRIN LOW STRENGTH) 81 mg EC tablet Take 81 mg by mouth daily     • atorvastatin (LIPITOR) 40 mg tablet TAKE 1 TABLET (40 MG TOTAL) BY MOUTH DAILY AT BEDTIME 90 tablet 2   • levothyroxine 25 mcg tablet TAKE 1 TABLET EVERY DAY 90 tablet 1   • losartan (COZAAR) 100 MG tablet TAKE 1 TABLET EVERY DAY 90 tablet 3   • metoprolol tartrate (LOPRESSOR) 50 mg tablet TAKE 1 TABLET TWICE DAILY 180 tablet 2   • Multiple Vitamins-Minerals (PRESERVISION AREDS PO) Take 1 tablet by mouth daily       • nitroglycerin (NITROSTAT) 0 4 mg SL tablet Place 1 tablet (0 4 mg total) under the tongue every 5 (five) minutes as needed for chest pain (Patient not taking: Reported on 5/9/2022) 10 tablet 1   • potassium chloride (K-DUR,KLOR-CON) 20 mEq tablet Take 20 mEq by mouth daily (Patient not taking: Reported on 5/9/2022)       No current facility-administered medications for this visit  No Known Allergies   Immunizations: There is no immunization history on file for this patient  Health Maintenance: There are no preventive care reminders to display for this patient  Topic Date Due   • COVID-19 Vaccine (1) Never done   • Pneumococcal Vaccine: 65+ Years (1 - PCV) Never done      Medicare Screening Tests and Risk Assessments:     Sriram Tee is here for her Subsequent Wellness visit  Health Risk Assessment:   Patient rates overall health as very good  Patient feels that their physical health rating is same  Patient is satisfied with their life  Eyesight was rated as same  Hearing was rated as same  Patient feels that their emotional and mental health rating is same  Patients states they are never, rarely angry  Patient states they are sometimes unusually tired/fatigued  Pain experienced in the last 7 days has been some  Patient's pain rating has been 4/10  Patient states that she has experienced no weight loss or gain in last 6 months  Depression Screening:   PHQ-2 Score: 0      Fall Risk Screening: In the past year, patient has experienced: no history of falling in past year      Urinary Incontinence Screening:   Patient has not leaked urine accidently in the last six months  Home Safety:  Patient has trouble with stairs inside or outside of their home  Patient has working smoke alarms and has working carbon monoxide detector  Home safety hazards include: none  Nutrition:   Current diet is Regular  Medications:   Patient is currently taking over-the-counter supplements  OTC medications include: see medication list  Patient is able to manage medications  Activities of Daily Living (ADLs)/Instrumental Activities of Daily Living (IADLs):   Walk and transfer into and out of bed and chair?: Yes  Dress and groom yourself?: Yes    Bathe or shower yourself?: Yes    Feed yourself? Yes  Do your laundry/housekeeping?: Yes  Manage your money, pay your bills and track your expenses?: Yes  Make your own meals?: Yes    Do your own shopping?: Yes    Previous Hospitalizations:   Any hospitalizations or ED visits within the last 12 months?: No      Advance Care Planning:   Living will: Yes    Durable POA for healthcare:  Yes    Advanced directive: Yes      Cognitive Screening:   Provider or family/friend/caregiver concerned regarding cognition?: No    PREVENTIVE SCREENINGS      Cardiovascular Screening:    General: Screening Not Indicated, History Lipid Disorder and Risks and Benefits Discussed    Due for: Lipid Panel      Diabetes Screening: "General: Risks and Benefits Discussed    Due for: Blood Glucose      Colorectal Cancer Screening:     General: Screening Not Indicated      Breast Cancer Screening:     General: Screening Not Indicated      Cervical Cancer Screening:    General: Screening Not Indicated      Lung Cancer Screening:     General: Screening Not Indicated    Screening, Brief Intervention, and Referral to Treatment (SBIRT)    Screening  Typical number of drinks in a day: 0  Typical number of drinks in a week: 0  Interpretation: Low risk drinking behavior  Single Item Drug Screening:  How often have you used an illegal drug (including marijuana) or a prescription medication for non-medical reasons in the past year? never    Single Item Drug Screen Score: 0  Interpretation: Negative screen for possible drug use disorder    No results found  Physical Exam:     /70   Pulse 62   Temp (!) 97 1 °F (36 2 °C)   Resp 18   Ht 4' 11\" (1 499 m)   Wt 50 1 kg (110 lb 6 oz)   LMP  (LMP Unknown)   SpO2 99%   BMI 22 29 kg/m²       Physical Exam  Vitals and nursing note reviewed  Constitutional:       General: She is not in acute distress  Appearance: Normal appearance  She is well-developed  She is not ill-appearing  HENT:      Head: Normocephalic and atraumatic  Right Ear: Tympanic membrane, ear canal and external ear normal  There is impacted cerumen  Left Ear: Tympanic membrane, ear canal and external ear normal  There is no impacted cerumen  Eyes:      General:         Right eye: No discharge  Left eye: No discharge  Extraocular Movements: Extraocular movements intact  Conjunctiva/sclera: Conjunctivae normal       Pupils: Pupils are equal, round, and reactive to light  Neck:      Vascular: No carotid bruit  Cardiovascular:      Rate and Rhythm: Normal rate and regular rhythm  Heart sounds: No murmur heard  Pulmonary:      Effort: Pulmonary effort is normal  No respiratory distress       " Breath sounds: Normal breath sounds  Abdominal:      General: Abdomen is flat  Bowel sounds are normal       Palpations: Abdomen is soft  Tenderness: There is no abdominal tenderness  There is no guarding or rebound  Musculoskeletal:      Right lower leg: No edema  Left lower leg: No edema  Lymphadenopathy:      Cervical: No cervical adenopathy  Skin:     General: Skin is warm and dry  Capillary Refill: Capillary refill takes less than 2 seconds  Neurological:      Mental Status: She is alert  Mental status is at baseline  Psychiatric:         Mood and Affect: Mood normal          Behavior: Behavior normal          Thought Content: Thought content normal          Judgment: Judgment normal       Ear cerumen removal    Date/Time: 5/10/2023 7:40 AM  Performed by: Nael Lopez MD  Authorized by: Nael Lopez MD   Universal Protocol:  Consent: Verbal consent obtained  Consent given by: patient      Patient location:  Clinic  Procedure details:     Local anesthetic:  None    Location:  R ear    Procedure type: irrigation with instrumentation      Instrumentation: curette      Approach:  Natural orifice  Post-procedure details:     Complication:  None    Hearing quality:  Improved    Patient tolerance of procedure:   Tolerated well, no immediate complications      I spent 30 minutes with this patient of which greater than 50% was spent counseling or reviewing chart  Nael Lopez MD

## 2023-05-10 NOTE — PATIENT INSTRUCTIONS
Medicare Preventive Visit Patient Instructions  Thank you for completing your Welcome to Medicare Visit or Medicare Annual Wellness Visit today  Your next wellness visit will be due in one year (5/10/2024)  The screening/preventive services that you may require over the next 5-10 years are detailed below  Some tests may not apply to you based off risk factors and/or age  Screening tests ordered at today's visit but not completed yet may show as past due  Also, please note that scanned in results may not display below  Preventive Screenings:  Service Recommendations Previous Testing/Comments   Colorectal Cancer Screening  * Colonoscopy    * Fecal Occult Blood Test (FOBT)/Fecal Immunochemical Test (FIT)  * Fecal DNA/Cologuard Test  * Flexible Sigmoidoscopy Age: 39-70 years old   Colonoscopy: every 10 years (may be performed more frequently if at higher risk)  OR  FOBT/FIT: every 1 year  OR  Cologuard: every 3 years  OR  Sigmoidoscopy: every 5 years  Screening may be recommended earlier than age 39 if at higher risk for colorectal cancer  Also, an individualized decision between you and your healthcare provider will decide whether screening between the ages of 74-80 would be appropriate  Colonoscopy: Not on file  FOBT/FIT: Not on file  Cologuard: Not on file  Sigmoidoscopy: Not on file    Screening Not Indicated     Breast Cancer Screening Age: 36 years old  Frequency: every 1-2 years  Not required if history of left and right mastectomy Mammogram: Not on file    Screening Not Indicated   Cervical Cancer Screening Between the ages of 21-29, pap smear recommended once every 3 years  Between the ages of 33-67, can perform pap smear with HPV co-testing every 5 years     Recommendations may differ for women with a history of total hysterectomy, cervical cancer, or abnormal pap smears in past  Pap Smear: Not on file    Screening Not Indicated   Hepatitis C Screening Once for adults born between [facility] frequently in patients at high risk for Hepatitis C Hep C Antibody: Not on file        Diabetes Screening 1-2 times per year if you're at risk for diabetes or have pre-diabetes Fasting glucose: 99 mg/dL (5/9/2022)  A1C: No results in last 5 years (No results in last 5 years)  Screening Current  Risks and Benefits Discussed  Due for Blood Glucose   Cholesterol Screening Once every 5 years if you don't have a lipid disorder  May order more often based on risk factors  Lipid panel: 05/09/2022    Screening Not Indicated  History Lipid Disorder  Due for Lipid Panel     Other Preventive Screenings Covered by Medicare:  1  Abdominal Aortic Aneurysm (AAA) Screening: covered once if your at risk  You're considered to be at risk if you have a family history of AAA  2  Lung Cancer Screening: covers low dose CT scan once per year if you meet all of the following conditions: (1) Age 50-69; (2) No signs or symptoms of lung cancer; (3) Current smoker or have quit smoking within the last 15 years; (4) You have a tobacco smoking history of at least 20 pack years (packs per day multiplied by number of years you smoked); (5) You get a written order from a healthcare provider  3  Glaucoma Screening: covered annually if you're considered high risk: (1) You have diabetes OR (2) Family history of glaucoma OR (3)  aged 48 and older OR (3)  American aged 72 and older  3  Osteoporosis Screening: covered every 2 years if you meet one of the following conditions: (1) You're estrogen deficient and at risk for osteoporosis based off medical history and other findings; (2) Have a vertebral abnormality; (3) On glucocorticoid therapy for more than 3 months; (4) Have primary hyperparathyroidism; (5) On osteoporosis medications and need to assess response to drug therapy  · Last bone density test (DXA Scan): Not on file  5  HIV Screening: covered annually if you're between the age of 12-76   Also covered annually if you are younger than 13 and older than 72 with risk factors for HIV infection  For pregnant patients, it is covered up to 3 times per pregnancy  Immunizations:  Immunization Recommendations   Influenza Vaccine Annual influenza vaccination during flu season is recommended for all persons aged >= 6 months who do not have contraindications   Pneumococcal Vaccine   * Pneumococcal conjugate vaccine = PCV13 (Prevnar 13), PCV15 (Vaxneuvance), PCV20 (Prevnar 20)  * Pneumococcal polysaccharide vaccine = PPSV23 (Pneumovax) Adults 25-60 years old: 1-3 doses may be recommended based on certain risk factors  Adults 72 years old: 1-2 doses may be recommended based off what pneumonia vaccine you previously received   Hepatitis B Vaccine 3 dose series if at intermediate or high risk (ex: diabetes, end stage renal disease, liver disease)   Tetanus (Td) Vaccine - COST NOT COVERED BY MEDICARE PART B Following completion of primary series, a booster dose should be given every 10 years to maintain immunity against tetanus  Td may also be given as tetanus wound prophylaxis  Tdap Vaccine - COST NOT COVERED BY MEDICARE PART B Recommended at least once for all adults  For pregnant patients, recommended with each pregnancy  Shingles Vaccine (Shingrix) - COST NOT COVERED BY MEDICARE PART B  2 shot series recommended in those aged 48 and above     Health Maintenance Due:  There are no preventive care reminders to display for this patient  Immunizations Due:      Topic Date Due   • COVID-19 Vaccine (1) Never done   • Pneumococcal Vaccine: 65+ Years (1 - PCV) Never done     Advance Directives   What are advance directives? Advance directives are legal documents that state your wishes and plans for medical care  These plans are made ahead of time in case you lose your ability to make decisions for yourself  Advance directives can apply to any medical decision, such as the treatments you want, and if you want to donate organs     What are the types of advance directives? There are many types of advance directives, and each state has rules about how to use them  You may choose a combination of any of the following:  · Living will: This is a written record of the treatment you want  You can also choose which treatments you do not want, which to limit, and which to stop at a certain time  This includes surgery, medicine, IV fluid, and tube feedings  · Durable power of  for healthcare [facility]): This is a written record that states who you want to make healthcare choices for you when you are unable to make them for yourself  This person, called a proxy, is usually a family member or a friend  You may choose more than 1 proxy  · Do not resuscitate (DNR) order:  A DNR order is used in case your heart stops beating or you stop breathing  It is a request not to have certain forms of treatment, such as CPR  A DNR order may be included in other types of advance directives  · Medical directive: This covers the care that you want if you are in a coma, near death, or unable to make decisions for yourself  You can list the treatments you want for each condition  Treatment may include pain medicine, surgery, blood transfusions, dialysis, IV or tube feedings, and a ventilator (breathing machine)  · Values history: This document has questions about your views, beliefs, and how you feel and think about life  This information can help others choose the care that you would choose  Why are advance directives important? An advance directive helps you control your care  Although spoken wishes may be used, it is better to have your wishes written down  Spoken wishes can be misunderstood, or not followed  Treatments may be given even if you do not want them  An advance directive may make it easier for your family to make difficult choices about your care         © Copyright [employer] 2018 Information is for End User's use only and may not be sold, redistributed or otherwise used for commercial purposes   All illustrations and images included in CareNotes® are the copyrighted property of A D A M , Inc  or [facility]

## 2023-05-11 LAB — BACTERIA UR CULT: NORMAL

## 2023-05-17 ENCOUNTER — TELEPHONE (OUTPATIENT)
Dept: OTHER | Facility: OTHER | Age: 83
End: 2023-05-17

## 2023-06-27 DIAGNOSIS — E78.2 MIXED HYPERLIPIDEMIA: ICD-10-CM

## 2023-06-27 DIAGNOSIS — I48.0 PAROXYSMAL ATRIAL FIBRILLATION (HCC): ICD-10-CM

## 2023-06-27 DIAGNOSIS — I10 ESSENTIAL HYPERTENSION: ICD-10-CM

## 2023-06-28 RX ORDER — METOPROLOL TARTRATE 50 MG/1
TABLET, FILM COATED ORAL
Qty: 180 TABLET | Refills: 2 | Status: SHIPPED | OUTPATIENT
Start: 2023-06-28

## 2023-06-28 RX ORDER — ATORVASTATIN CALCIUM 40 MG/1
TABLET, FILM COATED ORAL
Qty: 90 TABLET | Refills: 2 | Status: SHIPPED | OUTPATIENT
Start: 2023-06-28

## 2023-06-28 RX ORDER — AMLODIPINE BESYLATE 10 MG/1
TABLET ORAL
Qty: 90 TABLET | Refills: 2 | Status: SHIPPED | OUTPATIENT
Start: 2023-06-28

## 2023-07-09 PROBLEM — H61.21 IMPACTED CERUMEN OF RIGHT EAR: Status: RESOLVED | Noted: 2023-05-10 | Resolved: 2023-07-09

## 2023-11-06 DIAGNOSIS — E03.9 HYPOTHYROIDISM, UNSPECIFIED TYPE: ICD-10-CM

## 2023-11-06 RX ORDER — LEVOTHYROXINE SODIUM 0.03 MG/1
TABLET ORAL
Qty: 90 TABLET | Refills: 10 | Status: SHIPPED | OUTPATIENT
Start: 2023-11-06

## 2023-11-08 ENCOUNTER — RA CDI HCC (OUTPATIENT)
Dept: OTHER | Facility: HOSPITAL | Age: 83
End: 2023-11-08

## 2023-11-08 NOTE — PROGRESS NOTES
[address] [facility] coding opportunities       Chart reviewed, no opportunity found: CHART REVIEWED, NO OPPORTUNITY FOUND        Patients Insurance     Medicare Insurance: Medicare

## 2023-11-10 ENCOUNTER — OFFICE VISIT (OUTPATIENT)
Dept: FAMILY MEDICINE CLINIC | Facility: CLINIC | Age: 83
End: 2023-11-10
Payer: MEDICARE

## 2023-11-10 VITALS
DIASTOLIC BLOOD PRESSURE: 60 MMHG | TEMPERATURE: 97.6 F | RESPIRATION RATE: 18 BRPM | HEIGHT: 59 IN | BODY MASS INDEX: 23.06 KG/M2 | OXYGEN SATURATION: 95 % | WEIGHT: 114.38 LBS | HEART RATE: 73 BPM | SYSTOLIC BLOOD PRESSURE: 140 MMHG

## 2023-11-10 DIAGNOSIS — I10 ESSENTIAL HYPERTENSION: ICD-10-CM

## 2023-11-10 DIAGNOSIS — E78.2 MIXED HYPERLIPIDEMIA: ICD-10-CM

## 2023-11-10 DIAGNOSIS — E03.9 HYPOTHYROIDISM, UNSPECIFIED TYPE: Primary | ICD-10-CM

## 2023-11-10 PROCEDURE — 99213 OFFICE O/P EST LOW 20 MIN: CPT | Performed by: FAMILY MEDICINE

## 2023-11-10 NOTE — PROGRESS NOTES
FAMILY PRACTICE OFFICE VISIT       NAME: Palmira Boateng  AGE: 80 y.o. SEX: female       : 1940        MRN: [id number]    DATE: 11/10/2023  TIME: 9:04 AM    Assessment and Plan     Problem List Items Addressed This Visit       Hyperlipidemia     Hyperlipidemia. Lipid panel is stable on current dose of statin therapy         Relevant Orders    CBC    Comprehensive metabolic panel    Lipid panel    TSH, 3rd generation    Hypothyroidism - Primary     Hypothyroidism. TSH is stable on current dose of levothyroxine         Relevant Orders    CBC    Comprehensive metabolic panel    Lipid panel    TSH, 3rd generation    Essential hypertension     Hypertension. The patient's blood pressure is stable at this time and he will continue current regimen of medications         Relevant Orders    CBC    Comprehensive metabolic panel    Lipid panel    TSH, 3rd generation           Chief Complaint     Chief Complaint   Patient presents with    Follow-up     6 month       History of Present Illness     Patient in the office to review chronic medical condition. She denies any recent illness. She still sees her cardiologist at least once a year. Patient declines any recommended vaccinations. Patient stays active by leaving her house every day to go out to eat or shopping with her friends. Review of Systems   Review of Systems   Constitutional: Negative. HENT: Negative. Eyes: Negative. Respiratory: Negative. Cardiovascular: Negative. Gastrointestinal: Negative. Genitourinary: Negative. Musculoskeletal: Negative. Skin: Negative. Neurological: Negative. Psychiatric/Behavioral: Negative.          Active Problem List     Patient Active Problem List   Diagnosis    Arteriosclerosis of coronary artery    Hyperlipidemia    Hypertension    Esophageal reflux    Generalized anxiety disorder    Hypothyroidism    Well adult exam    Left lower quadrant pain    Paroxysmal atrial fibrillation ([address]) Chronic left-sided low back pain without sciatica    Essential hypertension       Past Medical History:  Past Medical History:   Diagnosis Date    Coronary artery disease        Past Surgical History:  Past Surgical History:   Procedure Laterality Date    CARDIAC CATHETERIZATION      post cath with PCI to the proximal LAD with a xience stent 2013    CORONARY ANGIOPLASTY WITH STENT PLACEMENT         Family History:  Family History   Problem Relation Age of Onset    Heart attack Mother         prior MI,  at the age of 80    Coronary artery disease Father     Hypertension Father     Cancer Sister     Coronary artery disease Sister     Diabetes Sister     Hyperlipidemia Sister     Diabetes Brother        Social History:  Social History     Socioeconomic History    Marital status: Single     Spouse name: Not on file    Number of children: Not on file    Years of education: Not on file    Highest education level: Not on file   Occupational History    Not on file   Tobacco Use    Smoking status: Never    Smokeless tobacco: Never   Vaping Use    Vaping Use: Never used   Substance and Sexual Activity    Alcohol use: Not Currently    Drug use: No    Sexual activity: Not Currently   Other Topics Concern    Not on file   Social History Narrative    Daily coffee consumption (4 cups/day)     Social Determinants of Health     Financial Resource Strain: Low Risk  (5/10/2023)    Overall Financial Resource Strain (CARDIA)     Difficulty of Paying Living Expenses: Not very hard   Food Insecurity: Not on file   Transportation Needs: No Transportation Needs (5/10/2023)    PRAPARE - Transportation     Lack of Transportation (Medical): No     Lack of Transportation (Non-Medical):  No   Physical Activity: Not on file   Stress: Not on file   Social Connections: Not on file   Intimate Partner Violence: Not on file   Housing Stability: Not on file       Objective     Vitals:    11/10/23 0759   BP: 140/60   Pulse: 73   Resp: 18 Temp: 97.6 °F (36.4 °C)   SpO2: 95%     Wt Readings from Last 3 Encounters:   11/10/23 51.9 kg (114 lb 6 oz)   05/10/23 50.1 kg (110 lb 6 oz)   05/04/23 49.9 kg (110 lb)       Physical Exam  Constitutional:       General: She is not in acute distress. Appearance: Normal appearance. She is not ill-appearing. HENT:      Head: Normocephalic and atraumatic. Right Ear: Tympanic membrane, ear canal and external ear normal. There is no impacted cerumen. Left Ear: Tympanic membrane, ear canal and external ear normal. There is no impacted cerumen. Eyes:      General:         Right eye: No discharge. Left eye: No discharge. Extraocular Movements: Extraocular movements intact. Conjunctiva/sclera: Conjunctivae normal.      Pupils: Pupils are equal, round, and reactive to light. Neck:      Vascular: No carotid bruit. Cardiovascular:      Rate and Rhythm: Normal rate and regular rhythm. Heart sounds: Normal heart sounds. No murmur heard. Pulmonary:      Effort: Pulmonary effort is normal.      Breath sounds: Normal breath sounds. No wheezing, rhonchi or rales. Abdominal:      General: Abdomen is flat. Bowel sounds are normal. There is no distension. Palpations: Abdomen is soft. Tenderness: There is no abdominal tenderness. There is no guarding or rebound. Musculoskeletal:      Right lower leg: No edema. Left lower leg: No edema. Lymphadenopathy:      Cervical: No cervical adenopathy. Skin:     Findings: No rash. Neurological:      General: No focal deficit present. Mental Status: She is alert and oriented to person, place, and time. Cranial Nerves: No cranial nerve deficit. Psychiatric:         Mood and Affect: Mood normal.         Behavior: Behavior normal.         Thought Content:  Thought content normal.         Judgment: Judgment normal.         Pertinent Laboratory/Diagnostic Studies:  Lab Results   Component Value Date    GLUCOSE 97 10/26/2015    BUN 13 05/10/2023    CREATININE 0.69 05/10/2023    CALCIUM 9.4 05/10/2023     10/26/2015    K 3.8 05/10/2023    CO2 27 05/10/2023     05/10/2023     Lab Results   Component Value Date    ALT 19 05/10/2023    AST 13 05/10/2023    ALKPHOS 98 05/10/2023    BILITOT 0.93 10/26/2015       Lab Results   Component Value Date    WBC 8.20 05/10/2023    HGB 14.9 05/10/2023    HCT 45.9 05/10/2023    MCV 91 05/10/2023     05/10/2023       No results found for: "TSH"    Lab Results   Component Value Date    CHOL 99 10/26/2015     Lab Results   Component Value Date    TRIG 69 05/10/2023     Lab Results   Component Value Date    HDL 50 05/10/2023     Lab Results   Component Value Date    LDLCALC 59 05/10/2023     No results found for: "HGBA1C"    Results for orders placed or performed in visit on 05/10/23   CBC   Result Value Ref Range    WBC 8.20 4. 31 - 10.16 Thousand/uL    RBC 5.06 3.81 - 5.12 Million/uL    Hemoglobin 14.9 11.5 - 15.4 g/dL    Hematocrit 45.9 34.8 - 46.1 %    MCV 91 82 - 98 fL    MCH 29.4 26.8 - 34.3 pg    MCHC 32.5 31.4 - 37.4 g/dL    RDW 12.9 11.6 - 15.1 %    Platelets 923 934 - 452 Thousands/uL    MPV 10.8 8.9 - 12.7 fL   Comprehensive metabolic panel   Result Value Ref Range    Sodium 135 135 - 147 mmol/L    Potassium 3.8 3.5 - 5.3 mmol/L    Chloride 104 96 - 108 mmol/L    CO2 27 21 - 32 mmol/L    ANION GAP 4 4 - 13 mmol/L    BUN 13 5 - 25 mg/dL    Creatinine 0.69 0.60 - 1.30 mg/dL    Glucose, Fasting 103 (H) 65 - 99 mg/dL    Calcium 9.4 8.3 - 10.1 mg/dL    AST 13 5 - 45 U/L    ALT 19 12 - 78 U/L    Alkaline Phosphatase 98 46 - 116 U/L    Total Protein 7.5 6.4 - 8.4 g/dL    Albumin 4.0 3.5 - 5.0 g/dL    Total Bilirubin 0.65 0.20 - 1.00 mg/dL    eGFR 81 ml/min/1.73sq m   TSH, 3rd generation   Result Value Ref Range    TSH 3RD GENERATON 2.620 0.450 - 4.500 uIU/mL       Orders Placed This Encounter   Procedures    CBC    Comprehensive metabolic panel    Lipid panel    TSH, 3rd generation       ALLERGIES:  No Known Allergies    Current Medications     Current Outpatient Medications   Medication Sig Dispense Refill    amLODIPine (NORVASC) 10 mg tablet TAKE 1 TABLET EVERY DAY 90 tablet 2    aspirin (ECOTRIN LOW STRENGTH) 81 mg EC tablet Take 81 mg by mouth daily      atorvastatin (LIPITOR) 40 mg tablet TAKE 1 TABLET AT BEDTIME 90 tablet 2    levothyroxine 25 mcg tablet TAKE 1 TABLET EVERY DAY 90 tablet 10    losartan (COZAAR) 100 MG tablet TAKE 1 TABLET EVERY DAY 90 tablet 3    metoprolol tartrate (LOPRESSOR) 50 mg tablet TAKE 1 TABLET TWICE DAILY 180 tablet 2    Multiple Vitamins-Minerals (PRESERVISION AREDS PO) Take 1 tablet by mouth daily        nitroglycerin (NITROSTAT) 0.4 mg SL tablet Place 1 tablet (0.4 mg total) under the tongue every 5 (five) minutes as needed for chest pain (Patient not taking: Reported on 5/9/2022) 10 tablet 1    potassium chloride (K-DUR,KLOR-CON) 20 mEq tablet Take 20 mEq by mouth daily (Patient not taking: Reported on 5/9/2022)       No current facility-administered medications for this visit. Health Maintenance     Health Maintenance   Topic Date Due    SLP PLAN OF CARE  Never done    COVID-19 Vaccine (1) Never done    Osteoporosis Screening  Never done    Pneumococcal Vaccine: 65+ Years (1 - PCV) Never done    Influenza Vaccine (1) 06/30/2024 (Originally 9/1/2023)    Fall Risk  05/10/2024    Urinary Incontinence Screening  05/10/2024    Medicare Annual Wellness Visit (AWV)  05/10/2024    Depression Screening  11/10/2024    BMI: Adult  11/10/2024    HIB Vaccine  Aged Out    IPV Vaccine  Aged Out    Hepatitis A Vaccine  Aged Out    Meningococcal ACWY Vaccine  Aged Out    HPV Vaccine  Aged Out       There is no immunization history on file for this patient.     Gayla Rodriguez MD

## 2023-12-06 ENCOUNTER — OFFICE VISIT (OUTPATIENT)
Dept: CARDIOLOGY CLINIC | Facility: CLINIC | Age: 83
End: 2023-12-06
Payer: MEDICARE

## 2023-12-06 VITALS
SYSTOLIC BLOOD PRESSURE: 150 MMHG | BODY MASS INDEX: 23.02 KG/M2 | OXYGEN SATURATION: 99 % | HEIGHT: 59 IN | DIASTOLIC BLOOD PRESSURE: 78 MMHG | HEART RATE: 65 BPM | WEIGHT: 114.2 LBS

## 2023-12-06 DIAGNOSIS — I10 ESSENTIAL HYPERTENSION: ICD-10-CM

## 2023-12-06 DIAGNOSIS — E78.2 MIXED HYPERLIPIDEMIA: ICD-10-CM

## 2023-12-06 DIAGNOSIS — I48.0 PAROXYSMAL ATRIAL FIBRILLATION (HCC): Primary | ICD-10-CM

## 2023-12-06 DIAGNOSIS — I25.10 ARTERIOSCLEROSIS OF CORONARY ARTERY: ICD-10-CM

## 2023-12-06 PROCEDURE — 99214 OFFICE O/P EST MOD 30 MIN: CPT | Performed by: INTERNAL MEDICINE

## 2023-12-06 PROCEDURE — 93000 ELECTROCARDIOGRAM COMPLETE: CPT | Performed by: INTERNAL MEDICINE

## 2023-12-06 NOTE — PROGRESS NOTES
Cardiology Follow Up    Marvin Bazzi  1940  [id number]     [facility] [place]  [address] [place] [address]    1. Paroxysmal atrial fibrillation (HCC)  POCT ECG      2. Essential hypertension  POCT ECG      3. Arteriosclerosis of coronary artery  POCT ECG      4. Mixed hyperlipidemia          Chief Complaint   Patient presents with    Follow-up     6 month ov. No current cardiac complaints. Interval History: Patient feels well, without complaints. No reported chest pain, shortness of breath, palpitations, lightheadedness, syncope, LE edema, orthopnea, PND, or significant weight changes. Patient remains active without any increased fatigue out of the ordinary.        Patient Active Problem List   Diagnosis    Arteriosclerosis of coronary artery    Hyperlipidemia    Hypertension    Esophageal reflux    Generalized anxiety disorder    Hypothyroidism    Well adult exam    Left lower quadrant pain    Paroxysmal atrial fibrillation (HCC)    Chronic left-sided low back pain without sciatica    Essential hypertension     Past Medical History:   Diagnosis Date    Coronary artery disease      Social History     Socioeconomic History    Marital status: Single     Spouse name: Not on file    Number of children: Not on file    Years of education: Not on file    Highest education level: Not on file   Occupational History    Not on file   Tobacco Use    Smoking status: Never    Smokeless tobacco: Never   Vaping Use    Vaping Use: Never used   Substance and Sexual Activity    Alcohol use: Not Currently    Drug use: No    Sexual activity: Not Currently   Other Topics Concern    Not on file   Social History Narrative    Daily coffee consumption (4 cups/day)     Social Determinants of Health     Financial Resource Strain: Low Risk  (5/10/2023)    Overall Financial Resource Strain (CARDIA)     Difficulty of Paying Living Expenses: Not very hard   Food Insecurity: Not on file   Transportation Needs: No Transportation Needs (5/10/2023)    PRAPARE - Transportation     Lack of Transportation (Medical): No     Lack of Transportation (Non-Medical): No   Physical Activity: Not on file   Stress: Not on file   Social Connections: Not on file   Intimate Partner Violence: Not on file   Housing Stability: Not on file      Family History   Problem Relation Age of Onset    Heart attack Mother         prior MI,  at the age of 80    Coronary artery disease Father     Hypertension Father     Cancer Sister     Coronary artery disease Sister     Diabetes Sister     Hyperlipidemia Sister     Diabetes Brother      Past Surgical History:   Procedure Laterality Date    CARDIAC CATHETERIZATION      post cath with PCI to the proximal LAD with a xience stent 2013    CORONARY ANGIOPLASTY WITH STENT PLACEMENT         Current Outpatient Medications:     amLODIPine (NORVASC) 10 mg tablet, TAKE 1 TABLET EVERY DAY, Disp: 90 tablet, Rfl: 2    aspirin (ECOTRIN LOW STRENGTH) 81 mg EC tablet, Take 81 mg by mouth daily, Disp: , Rfl:     atorvastatin (LIPITOR) 40 mg tablet, TAKE 1 TABLET AT BEDTIME, Disp: 90 tablet, Rfl: 2    levothyroxine 25 mcg tablet, TAKE 1 TABLET EVERY DAY, Disp: 90 tablet, Rfl: 10    losartan (COZAAR) 100 MG tablet, TAKE 1 TABLET EVERY DAY, Disp: 90 tablet, Rfl: 3    metoprolol tartrate (LOPRESSOR) 50 mg tablet, TAKE 1 TABLET TWICE DAILY, Disp: 180 tablet, Rfl: 2    Multiple Vitamins-Minerals (PRESERVISION AREDS PO), Take 1 tablet by mouth daily  , Disp: , Rfl:     nitroglycerin (NITROSTAT) 0.4 mg SL tablet, Place 1 tablet (0.4 mg total) under the tongue every 5 (five) minutes as needed for chest pain, Disp: 10 tablet, Rfl: 1  No Known Allergies    Labs:  No visits with results within 6 Month(s) from this visit.    Latest known visit with results is:   Appointment on 05/10/2023   Component Date Value    WBC 05/10/2023 8.20     RBC 05/10/2023 5.06     Hemoglobin 05/10/2023 14.9     Hematocrit 05/10/2023 45.9     MCV 05/10/2023 91     MCH 05/10/2023 29.4     MCHC 05/10/2023 32.5     RDW 05/10/2023 12.9     Platelets 98/40/2782 243     MPV 05/10/2023 10.8     Sodium 05/10/2023 135     Potassium 05/10/2023 3.8     Chloride 05/10/2023 104     CO2 05/10/2023 27     ANION GAP 05/10/2023 4     BUN 05/10/2023 13     Creatinine 05/10/2023 0.69     Glucose, Fasting 05/10/2023 103 (H)     Calcium 05/10/2023 9.4     AST 05/10/2023 13     ALT 05/10/2023 19     Alkaline Phosphatase 05/10/2023 98     Total Protein 05/10/2023 7.5     Albumin 05/10/2023 4.0     Total Bilirubin 05/10/2023 0.65     eGFR 05/10/2023 81     TSH 3RD GENERATON 05/10/2023 2.620      Lab Results   Component Value Date    CHOL 99 10/26/2015    TRIG 69 05/10/2023    TRIG 64 10/26/2015    HDL 50 05/10/2023    HDL 40 10/26/2015     Imaging: No results found. Review of Systems:  Review of Systems   Constitutional:  Negative for activity change, appetite change, chills, diaphoresis, fatigue and unexpected weight change. HENT:  Negative for hearing loss, nosebleeds and sore throat. Eyes:  Negative for photophobia and visual disturbance. Respiratory:  Negative for cough, chest tightness, shortness of breath and wheezing. Cardiovascular:  Negative for chest pain, palpitations and leg swelling. Gastrointestinal:  Negative for abdominal pain, diarrhea, nausea and vomiting. Endocrine: Negative for polyuria. Genitourinary:  Negative for dysuria, frequency and hematuria. Musculoskeletal:  Negative for arthralgias, back pain, gait problem and neck pain. Skin:  Negative for pallor and rash. Neurological:  Negative for dizziness, syncope and headaches. Hematological:  Does not bruise/bleed easily. Psychiatric/Behavioral:  Negative for behavioral problems and confusion. Physical Exam:  Physical Exam  Vitals reviewed.    Constitutional:       Appearance: She is well-developed. She is not diaphoretic. HENT:      Head: Normocephalic and atraumatic. Nose: Nose normal.   Eyes:      General: No scleral icterus. Pupils: Pupils are equal, round, and reactive to light. Neck:      Vascular: No JVD. Cardiovascular:      Rate and Rhythm: Normal rate and regular rhythm. Heart sounds: Normal heart sounds. No murmur heard. No friction rub. No gallop. Pulmonary:      Effort: Pulmonary effort is normal. No respiratory distress. Breath sounds: Normal breath sounds. No wheezing or rales. Abdominal:      General: Bowel sounds are normal. There is no distension. Palpations: Abdomen is soft. Tenderness: There is no abdominal tenderness. Musculoskeletal:         General: No deformity. Normal range of motion. Cervical back: Normal range of motion and neck supple. Skin:     General: Skin is warm and dry. Findings: No rash. Neurological:      Mental Status: She is alert and oriented to person, place, and time. Cranial Nerves: No cranial nerve deficit. Psychiatric:         Behavior: Behavior normal.     Blood pressure 150/78, pulse 65, height 4' 11" (1.499 m), weight 51.8 kg (114 lb 3.2 oz), SpO2 99 %. EKG:  Normal sinus rhythm  Lateral infarct, age undetermined  Abnormal ECG    Discussion/Summary:  1) CAD: s/p Xience stent Jan 2013, feels ok, compliant with medications. Heart healthy diet with increased fresh fruit and vegetables, lean proteins (chicken, fish, beans), whole grains (brown rice, whole wheat bread, whole wheat pasta), and reduce sugary desserts. Off Effient since it has been over 1 year of therapy since her PATRICK. Continue with baby ASA daily. Continues to do well with stable medical regimen. 2) HTN: BP mildly elevated today, but essentially at goal for age. Cough improved after lisinopril was stopped. Continue norvasc at 10mg daily along with losartan/HCTZ and metoprolol. No changes made today.      3) Hyperlipidemia: LDL at goal, 39 in Oct 2018. Continue statin. LDL 49 in Oct 2019, 48 in May 2020, 45 in Nov 2020 - at goal.  Recheck in Nov 2021 was 40 and 47 in May 2022. Repeat lipids in May 2023 revealed LDL of 59.    4) Paroxysmal afib: remote history, no recurrence. Continued on B-blocker, no AC necessary unless develops recurrence. No symptoms to suggest this, continue stable regimen of B-blocker.

## 2024-02-21 PROBLEM — Z00.00 WELL ADULT EXAM: Status: RESOLVED | Noted: 2018-04-03 | Resolved: 2024-02-21

## 2024-04-24 DIAGNOSIS — I10 ESSENTIAL HYPERTENSION: ICD-10-CM

## 2024-04-24 DIAGNOSIS — E78.2 MIXED HYPERLIPIDEMIA: ICD-10-CM

## 2024-04-24 DIAGNOSIS — I48.0 PAROXYSMAL ATRIAL FIBRILLATION (HCC): ICD-10-CM

## 2024-04-24 RX ORDER — LOSARTAN POTASSIUM 100 MG/1
TABLET ORAL
Qty: 90 TABLET | Refills: 1 | Status: SHIPPED | OUTPATIENT
Start: 2024-04-24

## 2024-04-25 RX ORDER — AMLODIPINE BESYLATE 10 MG/1
TABLET ORAL
Qty: 90 TABLET | Refills: 1 | Status: SHIPPED | OUTPATIENT
Start: 2024-04-25

## 2024-04-25 RX ORDER — METOPROLOL TARTRATE 50 MG/1
TABLET, FILM COATED ORAL
Qty: 180 TABLET | Refills: 1 | Status: SHIPPED | OUTPATIENT
Start: 2024-04-25

## 2024-04-25 RX ORDER — ATORVASTATIN CALCIUM 40 MG/1
TABLET, FILM COATED ORAL
Qty: 90 TABLET | Refills: 1 | Status: SHIPPED | OUTPATIENT
Start: 2024-04-25

## 2024-05-13 ENCOUNTER — APPOINTMENT (OUTPATIENT)
Dept: LAB | Facility: CLINIC | Age: 84
End: 2024-05-13
Payer: MEDICARE

## 2024-05-13 ENCOUNTER — OFFICE VISIT (OUTPATIENT)
Dept: FAMILY MEDICINE CLINIC | Facility: CLINIC | Age: 84
End: 2024-05-13
Payer: MEDICARE

## 2024-05-13 VITALS
BODY MASS INDEX: 22.09 KG/M2 | TEMPERATURE: 97.8 F | DIASTOLIC BLOOD PRESSURE: 82 MMHG | OXYGEN SATURATION: 97 % | HEIGHT: 59 IN | SYSTOLIC BLOOD PRESSURE: 128 MMHG | HEART RATE: 62 BPM | WEIGHT: 109.6 LBS | RESPIRATION RATE: 16 BRPM

## 2024-05-13 DIAGNOSIS — E78.2 MIXED HYPERLIPIDEMIA: ICD-10-CM

## 2024-05-13 DIAGNOSIS — E03.9 HYPOTHYROIDISM, UNSPECIFIED TYPE: ICD-10-CM

## 2024-05-13 DIAGNOSIS — Z00.00 MEDICARE ANNUAL WELLNESS VISIT, SUBSEQUENT: Primary | ICD-10-CM

## 2024-05-13 DIAGNOSIS — I10 PRIMARY HYPERTENSION: ICD-10-CM

## 2024-05-13 DIAGNOSIS — I10 ESSENTIAL HYPERTENSION: ICD-10-CM

## 2024-05-13 DIAGNOSIS — I48.0 PAROXYSMAL ATRIAL FIBRILLATION (HCC): ICD-10-CM

## 2024-05-13 LAB
ALBUMIN SERPL BCP-MCNC: 4.5 G/DL (ref 3.5–5)
ALP SERPL-CCNC: 101 U/L (ref 34–104)
ALT SERPL W P-5'-P-CCNC: 12 U/L (ref 7–52)
ANION GAP SERPL CALCULATED.3IONS-SCNC: 13 MMOL/L (ref 4–13)
AST SERPL W P-5'-P-CCNC: 16 U/L (ref 13–39)
BILIRUB SERPL-MCNC: 0.72 MG/DL (ref 0.2–1)
BUN SERPL-MCNC: 12 MG/DL (ref 5–25)
CALCIUM SERPL-MCNC: 9.4 MG/DL (ref 8.4–10.2)
CHLORIDE SERPL-SCNC: 100 MMOL/L (ref 96–108)
CHOLEST SERPL-MCNC: 115 MG/DL
CO2 SERPL-SCNC: 25 MMOL/L (ref 21–32)
CREAT SERPL-MCNC: 0.62 MG/DL (ref 0.6–1.3)
ERYTHROCYTE [DISTWIDTH] IN BLOOD BY AUTOMATED COUNT: 13 % (ref 11.6–15.1)
GFR SERPL CREATININE-BSD FRML MDRD: 83 ML/MIN/1.73SQ M
GLUCOSE P FAST SERPL-MCNC: 100 MG/DL (ref 65–99)
HCT VFR BLD AUTO: 43.8 % (ref 34.8–46.1)
HDLC SERPL-MCNC: 45 MG/DL
HGB BLD-MCNC: 14.4 G/DL (ref 11.5–15.4)
LDLC SERPL CALC-MCNC: 55 MG/DL (ref 0–100)
MCH RBC QN AUTO: 29.9 PG (ref 26.8–34.3)
MCHC RBC AUTO-ENTMCNC: 32.9 G/DL (ref 31.4–37.4)
MCV RBC AUTO: 91 FL (ref 82–98)
NONHDLC SERPL-MCNC: 70 MG/DL
PLATELET # BLD AUTO: 258 THOUSANDS/UL (ref 149–390)
PMV BLD AUTO: 10.6 FL (ref 8.9–12.7)
POTASSIUM SERPL-SCNC: 4.2 MMOL/L (ref 3.5–5.3)
PROT SERPL-MCNC: 7 G/DL (ref 6.4–8.4)
RBC # BLD AUTO: 4.81 MILLION/UL (ref 3.81–5.12)
SODIUM SERPL-SCNC: 138 MMOL/L (ref 135–147)
TRIGL SERPL-MCNC: 77 MG/DL
TSH SERPL DL<=0.05 MIU/L-ACNC: 2.02 UIU/ML (ref 0.45–4.5)
WBC # BLD AUTO: 9.41 THOUSAND/UL (ref 4.31–10.16)

## 2024-05-13 PROCEDURE — 80061 LIPID PANEL: CPT

## 2024-05-13 PROCEDURE — 85027 COMPLETE CBC AUTOMATED: CPT

## 2024-05-13 PROCEDURE — 80053 COMPREHEN METABOLIC PANEL: CPT

## 2024-05-13 PROCEDURE — G0439 PPPS, SUBSEQ VISIT: HCPCS | Performed by: FAMILY MEDICINE

## 2024-05-13 PROCEDURE — 84443 ASSAY THYROID STIM HORMONE: CPT

## 2024-05-13 PROCEDURE — 99213 OFFICE O/P EST LOW 20 MIN: CPT | Performed by: FAMILY MEDICINE

## 2024-05-13 PROCEDURE — 36415 COLL VENOUS BLD VENIPUNCTURE: CPT

## 2024-05-13 NOTE — PROGRESS NOTES
Assessment and Plan:     Problem List Items Addressed This Visit       Hyperlipidemia     Hyperlipidemia.  Patient will check lipid panel blood work and continue with current dose of statin therapy         Relevant Orders    CBC    Comprehensive metabolic panel    Lipid panel    TSH, 3rd generation    Hypertension     Hypertension.  The patient's blood pressure is stable at this time and he will continue current regimen of medications.  Patient will obtain blood work as ordered         Relevant Orders    CBC    Comprehensive metabolic panel    Lipid panel    TSH, 3rd generation    Hypothyroidism     Hypothyroidism.  Patient will check TSH blood work and continue with current dose of levothyroxine         Relevant Orders    CBC    Comprehensive metabolic panel    Lipid panel    TSH, 3rd generation    Paroxysmal atrial fibrillation (HCC)     Other Visit Diagnoses       Medicare annual wellness visit, subsequent    -  Primary             Preventive health issues were discussed with patient, and age appropriate screening tests were ordered as noted in patient's After Visit Summary.  Personalized health advice and appropriate referrals for health education or preventive services given if needed, as noted in patient's After Visit Summary.     History of Present Illness:     Patient presents for a Medicare Wellness Visit    Patient in the office to review chronic medical condition.  She denies any recent illness.  She sees her cardiologist every 6 months.  She denies any chest pain or shortness of breath.  Patient is compliant with taking current regimen of medications       Patient Care Team:  Sanjay Barreto MD as PCP - General  MD Sanjay Womack MD     Review of Systems:     Review of Systems   Constitutional: Negative.    HENT: Negative.     Eyes: Negative.    Respiratory: Negative.     Cardiovascular: Negative.    Gastrointestinal: Negative.    Genitourinary: Negative.    Musculoskeletal: Negative.     Skin: Negative.    Neurological: Negative.    Psychiatric/Behavioral: Negative.          Problem List:     Patient Active Problem List   Diagnosis    Arteriosclerosis of coronary artery    Hyperlipidemia    Hypertension    Esophageal reflux    Generalized anxiety disorder    Hypothyroidism    Left lower quadrant pain    Paroxysmal atrial fibrillation (HCC)    Chronic left-sided low back pain without sciatica    Essential hypertension      Past Medical and Surgical History:     Past Medical History:   Diagnosis Date    Coronary artery disease      Past Surgical History:   Procedure Laterality Date    CARDIAC CATHETERIZATION      post cath with PCI to the proximal LAD with a xience stent 2013    CORONARY ANGIOPLASTY WITH STENT PLACEMENT        Family History:     Family History   Problem Relation Age of Onset    Heart attack Mother         prior MI,  at the age of 90    Coronary artery disease Father     Hypertension Father     Cancer Sister     Coronary artery disease Sister     Diabetes Sister     Hyperlipidemia Sister     Diabetes Brother       Social History:     Social History     Socioeconomic History    Marital status: Single     Spouse name: None    Number of children: None    Years of education: None    Highest education level: None   Occupational History    None   Tobacco Use    Smoking status: Never    Smokeless tobacco: Never   Vaping Use    Vaping status: Never Used   Substance and Sexual Activity    Alcohol use: Not Currently    Drug use: No    Sexual activity: Not Currently   Other Topics Concern    None   Social History Narrative    Daily coffee consumption (4 cups/day)     Social Determinants of Health     Financial Resource Strain: Low Risk  (5/10/2023)    Overall Financial Resource Strain (CARDIA)     Difficulty of Paying Living Expenses: Not very hard   Food Insecurity: No Food Insecurity (2024)    Hunger Vital Sign     Worried About Running Out of Food in the Last Year: Never true      Ran Out of Food in the Last Year: Never true   Transportation Needs: No Transportation Needs (5/13/2024)    PRAPARE - Transportation     Lack of Transportation (Medical): No     Lack of Transportation (Non-Medical): No   Physical Activity: Not on file   Stress: Not on file   Social Connections: Not on file   Intimate Partner Violence: Not on file   Housing Stability: Low Risk  (5/13/2024)    Housing Stability Vital Sign     Unable to Pay for Housing in the Last Year: No     Number of Places Lived in the Last Year: 1     Unstable Housing in the Last Year: No      Medications and Allergies:     Current Outpatient Medications   Medication Sig Dispense Refill    amLODIPine (NORVASC) 10 mg tablet TAKE 1 TABLET EVERY DAY 90 tablet 1    aspirin (ECOTRIN LOW STRENGTH) 81 mg EC tablet Take 81 mg by mouth daily      atorvastatin (LIPITOR) 40 mg tablet TAKE 1 TABLET AT BEDTIME 90 tablet 1    levothyroxine 25 mcg tablet TAKE 1 TABLET EVERY DAY 90 tablet 10    losartan (COZAAR) 100 MG tablet TAKE 1 TABLET EVERY DAY 90 tablet 1    metoprolol tartrate (LOPRESSOR) 50 mg tablet TAKE 1 TABLET TWICE DAILY 180 tablet 1    Multiple Vitamins-Minerals (PRESERVISION AREDS PO) Take 1 tablet by mouth daily        nitroglycerin (NITROSTAT) 0.4 mg SL tablet Place 1 tablet (0.4 mg total) under the tongue every 5 (five) minutes as needed for chest pain 10 tablet 1     No current facility-administered medications for this visit.     No Known Allergies   Immunizations:       There is no immunization history on file for this patient.   Health Maintenance:     There are no preventive care reminders to display for this patient.      Topic Date Due    Pneumococcal Vaccine: 65+ Years (1 of 1 - PCV) Never done    COVID-19 Vaccine (1 - 2023-24 season) Never done      Medicare Screening Tests and Risk Assessments:     Jose De Jesus is here for her Subsequent Wellness visit. Last Medicare Wellness visit information reviewed, patient interviewed and updates made  to the record today.      Health Risk Assessment:   Patient rates overall health as very good. Patient feels that their physical health rating is same. Patient is satisfied with their life. Eyesight was rated as same. Hearing was rated as same. Patient feels that their emotional and mental health rating is same. Patients states they are never, rarely angry. Patient states they are sometimes unusually tired/fatigued. Pain experienced in the last 7 days has been none. Patient states that she has experienced no weight loss or gain in last 6 months.     Depression Screening:   PHQ-2 Score: 0      Fall Risk Screening:   In the past year, patient has experienced: no history of falling in past year      Urinary Incontinence Screening:   Patient has not leaked urine accidently in the last six months.     Home Safety:  Patient has trouble with stairs inside or outside of their home. Patient has working smoke alarms and has working carbon monoxide detector. Home safety hazards include: none.     Nutrition:   Current diet is Regular.     Medications:   Patient is currently taking over-the-counter supplements. OTC medications include: see medication list. Patient is able to manage medications.     Activities of Daily Living (ADLs)/Instrumental Activities of Daily Living (IADLs):   Walk and transfer into and out of bed and chair?: Yes  Dress and groom yourself?: Yes    Bathe or shower yourself?: Yes    Feed yourself? Yes  Do your laundry/housekeeping?: Yes  Manage your money, pay your bills and track your expenses?: Yes  Make your own meals?: Yes    Do your own shopping?: Yes    Previous Hospitalizations:   Any hospitalizations or ED visits within the last 12 months?: No      Advance Care Planning:   Living will: Yes    Durable POA for healthcare: Yes    Advanced directive: Yes      Cognitive Screening:   Provider or family/friend/caregiver concerned regarding cognition?: No    PREVENTIVE SCREENINGS      Cardiovascular  "Screening:    General: Screening Not Indicated, History Lipid Disorder and Risks and Benefits Discussed    Due for: Lipid Panel      Diabetes Screening:     General: Risks and Benefits Discussed    Due for: Blood Glucose      Colorectal Cancer Screening:     General: Screening Not Indicated      Breast Cancer Screening:     General: Screening Not Indicated      Cervical Cancer Screening:    General: Screening Not Indicated      Lung Cancer Screening:     General: Screening Not Indicated    Screening, Brief Intervention, and Referral to Treatment (SBIRT)    Screening  Typical number of drinks in a day: 0  Typical number of drinks in a week: 0  Interpretation: Low risk drinking behavior.    Single Item Drug Screening:  How often have you used an illegal drug (including marijuana) or a prescription medication for non-medical reasons in the past year? never    Single Item Drug Screen Score: 0  Interpretation: Negative screen for possible drug use disorder    No results found.     Physical Exam:     /82 (BP Location: Right arm, Patient Position: Sitting, Cuff Size: Standard)   Pulse 62   Temp 97.8 °F (36.6 °C) (Temporal)   Resp 16   Ht 4' 11\" (1.499 m)   Wt 49.7 kg (109 lb 9.6 oz)   LMP  (LMP Unknown)   SpO2 97%   BMI 22.14 kg/m²     Physical Exam  Vitals and nursing note reviewed.   Constitutional:       General: She is not in acute distress.     Appearance: Normal appearance. She is well-developed. She is not ill-appearing.   HENT:      Head: Normocephalic and atraumatic.      Right Ear: Tympanic membrane, ear canal and external ear normal. There is no impacted cerumen.      Left Ear: Tympanic membrane, ear canal and external ear normal. There is no impacted cerumen.   Eyes:      General:         Right eye: No discharge.         Left eye: No discharge.      Extraocular Movements: Extraocular movements intact.      Conjunctiva/sclera: Conjunctivae normal.      Pupils: Pupils are equal, round, and reactive " to light.   Neck:      Vascular: No carotid bruit.   Cardiovascular:      Rate and Rhythm: Normal rate and regular rhythm.      Heart sounds: No murmur heard.  Pulmonary:      Effort: Pulmonary effort is normal. No respiratory distress.      Breath sounds: Normal breath sounds.   Abdominal:      General: Abdomen is flat. Bowel sounds are normal.      Palpations: Abdomen is soft.      Tenderness: There is no abdominal tenderness. There is no guarding or rebound.   Musculoskeletal:      Right lower leg: No edema.      Left lower leg: No edema.   Lymphadenopathy:      Cervical: No cervical adenopathy.   Skin:     General: Skin is warm and dry.      Capillary Refill: Capillary refill takes less than 2 seconds.   Neurological:      Mental Status: She is alert. Mental status is at baseline.   Psychiatric:         Mood and Affect: Mood normal.         Behavior: Behavior normal.         Thought Content: Thought content normal.         Judgment: Judgment normal.          Sanjay Barreto MD

## 2024-05-13 NOTE — ASSESSMENT & PLAN NOTE
Hypertension.  The patient's blood pressure is stable at this time and he will continue current regimen of medications.  Patient will obtain blood work as ordered

## 2024-05-13 NOTE — PATIENT INSTRUCTIONS
Medicare Preventive Visit Patient Instructions  Thank you for completing your Welcome to Medicare Visit or Medicare Annual Wellness Visit today. Your next wellness visit will be due in one year (5/14/2025).  The screening/preventive services that you may require over the next 5-10 years are detailed below. Some tests may not apply to you based off risk factors and/or age. Screening tests ordered at today's visit but not completed yet may show as past due. Also, please note that scanned in results may not display below.  Preventive Screenings:  Service Recommendations Previous Testing/Comments   Colorectal Cancer Screening  * Colonoscopy    * Fecal Occult Blood Test (FOBT)/Fecal Immunochemical Test (FIT)  * Fecal DNA/Cologuard Test  * Flexible Sigmoidoscopy Age: 45-75 years old   Colonoscopy: every 10 years (may be performed more frequently if at higher risk)  OR  FOBT/FIT: every 1 year  OR  Cologuard: every 3 years  OR  Sigmoidoscopy: every 5 years  Screening may be recommended earlier than age 45 if at higher risk for colorectal cancer. Also, an individualized decision between you and your healthcare provider will decide whether screening between the ages of 76-85 would be appropriate. Colonoscopy: Not on file  FOBT/FIT: Not on file  Cologuard: Not on file  Sigmoidoscopy: Not on file    Screening Not Indicated     Breast Cancer Screening Age: 40+ years old  Frequency: every 1-2 years  Not required if history of left and right mastectomy Mammogram: Not on file    Screening Not Indicated   Cervical Cancer Screening Between the ages of 21-29, pap smear recommended once every 3 years.   Between the ages of 30-65, can perform pap smear with HPV co-testing every 5 years.   Recommendations may differ for women with a history of total hysterectomy, cervical cancer, or abnormal pap smears in past. Pap Smear: Not on file    Screening Not Indicated   Hepatitis C Screening Once for adults born between 1945 and 1965  More  frequently in patients at high risk for Hepatitis C Hep C Antibody: Not on file        Diabetes Screening 1-2 times per year if you're at risk for diabetes or have pre-diabetes Fasting glucose: 103 mg/dL (5/10/2023)  A1C: No results in last 5 years (No results in last 5 years)  Risks and Benefits Discussed  Due for Blood Glucose   Cholesterol Screening Once every 5 years if you don't have a lipid disorder. May order more often based on risk factors. Lipid panel: 05/10/2023    Screening Not Indicated  History Lipid Disorder  Risks and Benefits Discussed  Due for Lipid Panel     Other Preventive Screenings Covered by Medicare:  Abdominal Aortic Aneurysm (AAA) Screening: covered once if your at risk. You're considered to be at risk if you have a family history of AAA.  Lung Cancer Screening: covers low dose CT scan once per year if you meet all of the following conditions: (1) Age 55-77; (2) No signs or symptoms of lung cancer; (3) Current smoker or have quit smoking within the last 15 years; (4) You have a tobacco smoking history of at least 20 pack years (packs per day multiplied by number of years you smoked); (5) You get a written order from a healthcare provider.  Glaucoma Screening: covered annually if you're considered high risk: (1) You have diabetes OR (2) Family history of glaucoma OR (3)  aged 50 and older OR (4)  American aged 65 and older  Osteoporosis Screening: covered every 2 years if you meet one of the following conditions: (1) You're estrogen deficient and at risk for osteoporosis based off medical history and other findings; (2) Have a vertebral abnormality; (3) On glucocorticoid therapy for more than 3 months; (4) Have primary hyperparathyroidism; (5) On osteoporosis medications and need to assess response to drug therapy.   Last bone density test (DXA Scan): Not on file.  HIV Screening: covered annually if you're between the age of 15-65. Also covered annually if you are  younger than 15 and older than 65 with risk factors for HIV infection. For pregnant patients, it is covered up to 3 times per pregnancy.    Immunizations:  Immunization Recommendations   Influenza Vaccine Annual influenza vaccination during flu season is recommended for all persons aged >= 6 months who do not have contraindications   Pneumococcal Vaccine   * Pneumococcal conjugate vaccine = PCV13 (Prevnar 13), PCV15 (Vaxneuvance), PCV20 (Prevnar 20)  * Pneumococcal polysaccharide vaccine = PPSV23 (Pneumovax) Adults 19-63 yo with certain risk factors or if 65+ yo  If never received any pneumonia vaccine: recommend Prevnar 20 (PCV20)  Give PCV20 if previously received 1 dose of PCV13 or PPSV23   Hepatitis B Vaccine 3 dose series if at intermediate or high risk (ex: diabetes, end stage renal disease, liver disease)   Respiratory syncytial virus (RSV) Vaccine - COVERED BY MEDICARE PART D  * RSVPreF3 (Arexvy) CDC recommends that adults 60 years of age and older may receive a single dose of RSV vaccine using shared clinical decision-making (SCDM)   Tetanus (Td) Vaccine - COST NOT COVERED BY MEDICARE PART B Following completion of primary series, a booster dose should be given every 10 years to maintain immunity against tetanus. Td may also be given as tetanus wound prophylaxis.   Tdap Vaccine - COST NOT COVERED BY MEDICARE PART B Recommended at least once for all adults. For pregnant patients, recommended with each pregnancy.   Shingles Vaccine (Shingrix) - COST NOT COVERED BY MEDICARE PART B  2 shot series recommended in those 19 years and older who have or will have weakened immune systems or those 50 years and older     Health Maintenance Due:  There are no preventive care reminders to display for this patient.  Immunizations Due:      Topic Date Due   • Pneumococcal Vaccine: 65+ Years (1 of 1 - PCV) Never done   • COVID-19 Vaccine (1 - 2023-24 season) Never done     Advance Directives   What are advance directives?   Advance directives are legal documents that state your wishes and plans for medical care. These plans are made ahead of time in case you lose your ability to make decisions for yourself. Advance directives can apply to any medical decision, such as the treatments you want, and if you want to donate organs.   What are the types of advance directives?  There are many types of advance directives, and each state has rules about how to use them. You may choose a combination of any of the following:  Living will:  This is a written record of the treatment you want. You can also choose which treatments you do not want, which to limit, and which to stop at a certain time. This includes surgery, medicine, IV fluid, and tube feedings.   Durable power of  for healthcare (DPAHC):  This is a written record that states who you want to make healthcare choices for you when you are unable to make them for yourself. This person, called a proxy, is usually a family member or a friend. You may choose more than 1 proxy.  Do not resuscitate (DNR) order:  A DNR order is used in case your heart stops beating or you stop breathing. It is a request not to have certain forms of treatment, such as CPR. A DNR order may be included in other types of advance directives.  Medical directive:  This covers the care that you want if you are in a coma, near death, or unable to make decisions for yourself. You can list the treatments you want for each condition. Treatment may include pain medicine, surgery, blood transfusions, dialysis, IV or tube feedings, and a ventilator (breathing machine).  Values history:  This document has questions about your views, beliefs, and how you feel and think about life. This information can help others choose the care that you would choose.  Why are advance directives important?  An advance directive helps you control your care. Although spoken wishes may be used, it is better to have your wishes written down.  Spoken wishes can be misunderstood, or not followed. Treatments may be given even if you do not want them. An advance directive may make it easier for your family to make difficult choices about your care.       © Copyright [employer] 2018 Information is for End User's use only and may not be sold, redistributed or otherwise used for commercial purposes. All illustrations and images included in CareNotes® are the copyrighted property of A.D.A.M., Inc. or "[employer]

## 2024-05-13 NOTE — ASSESSMENT & PLAN NOTE
Hyperlipidemia.  Patient will check lipid panel blood work and continue with current dose of statin therapy

## 2024-06-26 ENCOUNTER — OFFICE VISIT (OUTPATIENT)
Dept: CARDIOLOGY CLINIC | Facility: CLINIC | Age: 84
End: 2024-06-26
Payer: MEDICARE

## 2024-06-26 VITALS
HEART RATE: 61 BPM | WEIGHT: 108.9 LBS | SYSTOLIC BLOOD PRESSURE: 122 MMHG | BODY MASS INDEX: 21.96 KG/M2 | DIASTOLIC BLOOD PRESSURE: 78 MMHG | HEIGHT: 59 IN | OXYGEN SATURATION: 97 %

## 2024-06-26 DIAGNOSIS — E78.2 MIXED HYPERLIPIDEMIA: ICD-10-CM

## 2024-06-26 DIAGNOSIS — I48.0 PAROXYSMAL ATRIAL FIBRILLATION (HCC): Primary | ICD-10-CM

## 2024-06-26 DIAGNOSIS — I25.10 ARTERIOSCLEROSIS OF CORONARY ARTERY: ICD-10-CM

## 2024-06-26 DIAGNOSIS — I10 ESSENTIAL HYPERTENSION: ICD-10-CM

## 2024-06-26 PROCEDURE — 99214 OFFICE O/P EST MOD 30 MIN: CPT | Performed by: INTERNAL MEDICINE

## 2024-06-26 PROCEDURE — 93000 ELECTROCARDIOGRAM COMPLETE: CPT | Performed by: INTERNAL MEDICINE

## 2024-06-26 NOTE — PROGRESS NOTES
Cardiology Follow Up    Jose De Jesus Boateng  1940  [id number]     HEART & VASCULAR [facility]  [address]  [facility] [address]    1. Paroxysmal atrial fibrillation (HCC)  POCT ECG      2. Arteriosclerosis of coronary artery        3. Essential hypertension        4. Mixed hyperlipidemia          Chief Complaint   Patient presents with    Follow-up     No cardiac symptoms.      Interval History: Patient feels well, without complaints.  No reported chest pain, shortness of breath, palpitations, lightheadedness, syncope, LE edema, orthopnea, PND, or significant weight changes.  Patient remains active without any increased fatigue out of the ordinary.           Patient Active Problem List   Diagnosis    Arteriosclerosis of coronary artery    Hyperlipidemia    Esophageal reflux    Generalized anxiety disorder    Hypothyroidism    Left lower quadrant pain    Paroxysmal atrial fibrillation (HCC)    Chronic left-sided low back pain without sciatica    Essential hypertension     Past Medical History:   Diagnosis Date    Coronary artery disease      Social History     Socioeconomic History    Marital status: Single     Spouse name: Not on file    Number of children: Not on file    Years of education: Not on file    Highest education level: Not on file   Occupational History    Not on file   Tobacco Use    Smoking status: Never    Smokeless tobacco: Never   Vaping Use    Vaping status: Never Used   Substance and Sexual Activity    Alcohol use: Not Currently    Drug use: No    Sexual activity: Not Currently   Other Topics Concern    Not on file   Social History Narrative    Daily coffee consumption (4 cups/day)     Social Determinants of Health     Financial Resource Strain: Low Risk  (5/10/2023)    Overall Financial Resource Strain (CARDIA)     Difficulty of Paying Living Expenses: Not very hard   Food Insecurity: No Food Insecurity  (2024)    Hunger Vital Sign     Worried About Running Out of Food in the Last Year: Never true     Ran Out of Food in the Last Year: Never true   Transportation Needs: No Transportation Needs (2024)    PRAPARE - Transportation     Lack of Transportation (Medical): No     Lack of Transportation (Non-Medical): No   Physical Activity: Not on file   Stress: Not on file   Social Connections: Not on file   Intimate Partner Violence: Not on file   Housing Stability: Low Risk  (2024)    Housing Stability Vital Sign     Unable to Pay for Housing in the Last Year: No     Number of Times Moved in the Last Year: 1     Homeless in the Last Year: No      Family History   Problem Relation Age of Onset    Heart attack Mother         prior MI,  at the age of 90    Coronary artery disease Father     Hypertension Father     Cancer Sister     Coronary artery disease Sister     Diabetes Sister     Hyperlipidemia Sister     Diabetes Brother      Past Surgical History:   Procedure Laterality Date    CARDIAC CATHETERIZATION      post cath with PCI to the proximal LAD with a xience stent 2013    CORONARY ANGIOPLASTY WITH STENT PLACEMENT         Current Outpatient Medications:     amLODIPine (NORVASC) 10 mg tablet, TAKE 1 TABLET EVERY DAY, Disp: 90 tablet, Rfl: 1    aspirin (ECOTRIN LOW STRENGTH) 81 mg EC tablet, Take 81 mg by mouth daily, Disp: , Rfl:     atorvastatin (LIPITOR) 40 mg tablet, TAKE 1 TABLET AT BEDTIME, Disp: 90 tablet, Rfl: 1    levothyroxine 25 mcg tablet, TAKE 1 TABLET EVERY DAY, Disp: 90 tablet, Rfl: 10    losartan (COZAAR) 100 MG tablet, TAKE 1 TABLET EVERY DAY, Disp: 90 tablet, Rfl: 1    metoprolol tartrate (LOPRESSOR) 50 mg tablet, TAKE 1 TABLET TWICE DAILY, Disp: 180 tablet, Rfl: 1    Multiple Vitamins-Minerals (PRESERVISION AREDS PO), Take 1 tablet by mouth daily  , Disp: , Rfl:     nitroglycerin (NITROSTAT) 0.4 mg SL tablet, Place 1 tablet (0.4 mg total) under the tongue every 5 (five) minutes  as needed for chest pain, Disp: 10 tablet, Rfl: 1  No Known Allergies    Labs:  Appointment on 05/13/2024   Component Date Value    WBC 05/13/2024 9.41     RBC 05/13/2024 4.81     Hemoglobin 05/13/2024 14.4     Hematocrit 05/13/2024 43.8     MCV 05/13/2024 91     MCH 05/13/2024 29.9     MCHC 05/13/2024 32.9     RDW 05/13/2024 13.0     Platelets 05/13/2024 258     MPV 05/13/2024 10.6     Sodium 05/13/2024 138     Potassium 05/13/2024 4.2     Chloride 05/13/2024 100     CO2 05/13/2024 25     ANION GAP 05/13/2024 13     BUN 05/13/2024 12     Creatinine 05/13/2024 0.62     Glucose, Fasting 05/13/2024 100 (H)     Calcium 05/13/2024 9.4     AST 05/13/2024 16     ALT 05/13/2024 12     Alkaline Phosphatase 05/13/2024 101     Total Protein 05/13/2024 7.0     Albumin 05/13/2024 4.5     Total Bilirubin 05/13/2024 0.72     eGFR 05/13/2024 83     Cholesterol 05/13/2024 115     Triglycerides 05/13/2024 77     HDL, Direct 05/13/2024 45 (L)     LDL Calculated 05/13/2024 55     Non-HDL-Chol (CHOL-HDL) 05/13/2024 70     TSH 3RD GENERATON 05/13/2024 2.020      Lab Results   Component Value Date    CHOL 99 10/26/2015    TRIG 77 05/13/2024    TRIG 64 10/26/2015    HDL 45 (L) 05/13/2024    HDL 40 10/26/2015     Imaging: No results found.    Review of Systems:  Review of Systems   Constitutional:  Negative for activity change, appetite change, chills, diaphoresis, fatigue and unexpected weight change.   HENT:  Negative for hearing loss, nosebleeds and sore throat.    Eyes:  Negative for photophobia and visual disturbance.   Respiratory:  Negative for cough, chest tightness, shortness of breath and wheezing.    Cardiovascular:  Negative for chest pain, palpitations and leg swelling.   Gastrointestinal:  Negative for abdominal pain, diarrhea, nausea and vomiting.   Endocrine: Negative for polyuria.   Genitourinary:  Negative for dysuria, frequency and hematuria.   Musculoskeletal:  Negative for arthralgias, back pain, gait problem and neck  "pain.   Skin:  Negative for pallor and rash.   Neurological:  Negative for dizziness, syncope and headaches.   Hematological:  Does not bruise/bleed easily.   Psychiatric/Behavioral:  Negative for behavioral problems and confusion.        Physical Exam:  Physical Exam  Vitals reviewed.   Constitutional:       Appearance: Normal appearance. She is well-developed. She is not ill-appearing or diaphoretic.   HENT:      Head: Normocephalic and atraumatic.      Nose: Nose normal.   Eyes:      General: No scleral icterus.     Extraocular Movements: Extraocular movements intact.      Pupils: Pupils are equal, round, and reactive to light.   Neck:      Vascular: No JVD.   Cardiovascular:      Rate and Rhythm: Normal rate and regular rhythm.      Heart sounds: Normal heart sounds. No murmur heard.     No friction rub. No gallop.   Pulmonary:      Effort: Pulmonary effort is normal. No respiratory distress.      Breath sounds: Normal breath sounds. No wheezing or rales.   Abdominal:      General: Bowel sounds are normal. There is no distension.      Palpations: Abdomen is soft.      Tenderness: There is no abdominal tenderness.   Musculoskeletal:         General: No deformity. Normal range of motion.      Cervical back: Normal range of motion and neck supple.      Right lower leg: No edema.      Left lower leg: No edema.   Skin:     General: Skin is warm and dry.      Findings: No rash.   Neurological:      Mental Status: She is alert and oriented to person, place, and time.      Cranial Nerves: No cranial nerve deficit.   Psychiatric:         Mood and Affect: Mood normal.         Behavior: Behavior normal.     Blood pressure 122/78, pulse 61, height 4' 11\" (1.499 m), weight 49.4 kg (108 lb 14.4 oz), SpO2 97%.    EKG:  Normal sinus rhythm  Septal infarct, age undetermined  Lateral infarct, age undetermined  Abnormal ECG    Discussion/Summary:  1) CAD: s/p Xience stent Jan 2013, feels ok, compliant with medications. Heart " healthy diet with increased fresh fruit and vegetables, lean proteins (chicken, fish, beans), whole grains (brown rice, whole wheat bread, whole wheat pasta), and reduce sugary desserts. Off Effient since it has been over 1 year of therapy since her PATRICK. Continue with baby ASA daily.  Remains asymptomatic and doing well.     2) HTN: BP much improved today, and remains at goal for age.  Cough improved after lisinopril was stopped. Continue norvasc at 10mg daily along with losartan and metoprolol.  HCTZ was stopped since last visit, appears to be tolerating well from a blood pressure perspective.     3) Hyperlipidemia: LDL at goal, 39 in Oct 2018.  Continue statin.  LDL 49 in Oct 2019, 48 in May 2020, 38 in Nov 2020 - at goal.  Recheck in Nov 2021 was 44 and 54 in May 2022.  Repeat lipids in May 2023 revealed LDL of 59.  LDL remains well-controlled at 55 in May 2024.    4) Paroxysmal afib: remote history, no recurrence.  Continued on B-blocker, no AC necessary unless develops recurrence.  Continues without symptoms to suggest recurrence.

## 2024-09-27 DIAGNOSIS — I10 ESSENTIAL HYPERTENSION: ICD-10-CM

## 2024-09-27 DIAGNOSIS — I48.0 PAROXYSMAL ATRIAL FIBRILLATION (HCC): ICD-10-CM

## 2024-09-27 DIAGNOSIS — E78.2 MIXED HYPERLIPIDEMIA: ICD-10-CM

## 2024-09-27 RX ORDER — LOSARTAN POTASSIUM 100 MG/1
TABLET ORAL
Qty: 90 TABLET | Refills: 1 | Status: SHIPPED | OUTPATIENT
Start: 2024-09-27

## 2024-09-28 RX ORDER — METOPROLOL TARTRATE 50 MG
TABLET ORAL
Qty: 180 TABLET | Refills: 1 | Status: SHIPPED | OUTPATIENT
Start: 2024-09-28

## 2024-09-28 RX ORDER — AMLODIPINE BESYLATE 10 MG/1
TABLET ORAL
Qty: 90 TABLET | Refills: 1 | Status: SHIPPED | OUTPATIENT
Start: 2024-09-28

## 2024-09-28 RX ORDER — ATORVASTATIN CALCIUM 40 MG/1
TABLET, FILM COATED ORAL
Qty: 90 TABLET | Refills: 1 | Status: SHIPPED | OUTPATIENT
Start: 2024-09-28

## 2024-11-13 ENCOUNTER — OFFICE VISIT (OUTPATIENT)
Dept: FAMILY MEDICINE CLINIC | Facility: CLINIC | Age: 84
End: 2024-11-13
Payer: MEDICARE

## 2024-11-13 VITALS
HEART RATE: 50 BPM | DIASTOLIC BLOOD PRESSURE: 68 MMHG | OXYGEN SATURATION: 97 % | WEIGHT: 110.2 LBS | BODY MASS INDEX: 22.26 KG/M2 | SYSTOLIC BLOOD PRESSURE: 130 MMHG

## 2024-11-13 DIAGNOSIS — E03.9 HYPOTHYROIDISM, UNSPECIFIED TYPE: ICD-10-CM

## 2024-11-13 DIAGNOSIS — I10 ESSENTIAL HYPERTENSION: Primary | ICD-10-CM

## 2024-11-13 PROCEDURE — 99213 OFFICE O/P EST LOW 20 MIN: CPT | Performed by: FAMILY MEDICINE

## 2024-11-13 PROCEDURE — G2211 COMPLEX E/M VISIT ADD ON: HCPCS | Performed by: FAMILY MEDICINE

## 2024-11-13 NOTE — PROGRESS NOTES
FAMILY PRACTICE OFFICE VISIT       NAME: Jose De Jesus Boateng  AGE: 83 y.o. SEX: female       : 1940        MRN: [id number]    DATE: 2024  TIME: 8:10 AM    Assessment and Plan     Problem List Items Addressed This Visit       Hypothyroidism    Hypothyroidism.  TSH is stable on current dose of levothyroxine         Essential hypertension - Primary    Hypertension.  The patient's blood pressure is stable at this time and he will continue current regimen of medications                Chief Complaint     Chief Complaint   Patient presents with    Follow-up     6 months        History of Present Illness     Patient in the office to review chronic medical condition.  She denies any recent illness.  She is compliant with taking current regimen of medications.  She declines any recommended vaccinations.        Review of Systems   Review of Systems   Constitutional: Negative.    HENT: Negative.     Eyes: Negative.    Respiratory: Negative.     Cardiovascular: Negative.    Gastrointestinal: Negative.    Genitourinary: Negative.    Musculoskeletal: Negative.    Skin: Negative.    Neurological: Negative.    Psychiatric/Behavioral: Negative.         Active Problem List     Patient Active Problem List   Diagnosis    Arteriosclerosis of coronary artery    Hyperlipidemia    Esophageal reflux    Generalized anxiety disorder    Hypothyroidism    Left lower quadrant pain    Paroxysmal atrial fibrillation (HCC)    Chronic left-sided low back pain without sciatica    Essential hypertension       Past Medical History:  Past Medical History:   Diagnosis Date    Coronary artery disease        Past Surgical History:  Past Surgical History:   Procedure Laterality Date    CARDIAC CATHETERIZATION      post cath with PCI to the proximal LAD with a xience stent 2013    CORONARY ANGIOPLASTY WITH STENT PLACEMENT         Family History:  Family History   Problem Relation Age of Onset    Heart attack Mother         prior MI,  at  the age of 90    Coronary artery disease Father     Hypertension Father     Cancer Sister     Coronary artery disease Sister     Diabetes Sister     Hyperlipidemia Sister     Diabetes Brother        Social History:  Social History     Socioeconomic History    Marital status: Single     Spouse name: Not on file    Number of children: Not on file    Years of education: Not on file    Highest education level: Not on file   Occupational History    Not on file   Tobacco Use    Smoking status: Never    Smokeless tobacco: Never   Vaping Use    Vaping status: Never Used   Substance and Sexual Activity    Alcohol use: Not Currently    Drug use: No    Sexual activity: Not Currently   Other Topics Concern    Not on file   Social History Narrative    Daily coffee consumption (4 cups/day)     Social Drivers of Health     Financial Resource Strain: Low Risk  (5/10/2023)    Overall Financial Resource Strain (CARDIA)     Difficulty of Paying Living Expenses: Not very hard   Food Insecurity: No Food Insecurity (5/13/2024)    Nursing - Inadequate Food Risk Classification     Worried About Running Out of Food in the Last Year: Never true     Ran Out of Food in the Last Year: Never true     Ran Out of Food in the Last Year: Not on file   Transportation Needs: No Transportation Needs (5/13/2024)    PRAPARE - Transportation     Lack of Transportation (Medical): No     Lack of Transportation (Non-Medical): No   Physical Activity: Not on file   Stress: Not on file   Social Connections: Not on file   Intimate Partner Violence: Not on file   Housing Stability: Low Risk  (5/13/2024)    Housing Stability Vital Sign     Unable to Pay for Housing in the Last Year: No     Number of Times Moved in the Last Year: 1     Homeless in the Last Year: No       Objective     Vitals:    11/13/24 0725   BP: 130/68   Pulse: (!) 50   SpO2: 97%     Wt Readings from Last 3 Encounters:   11/13/24 50 kg (110 lb 3.2 oz)   06/26/24 49.4 kg (108 lb 14.4 oz)    05/13/24 49.7 kg (109 lb 9.6 oz)       Physical Exam  Constitutional:       General: She is not in acute distress.     Appearance: Normal appearance. She is not ill-appearing.   HENT:      Head: Normocephalic and atraumatic.   Eyes:      General:         Right eye: No discharge.         Left eye: No discharge.      Extraocular Movements: Extraocular movements intact.      Conjunctiva/sclera: Conjunctivae normal.      Pupils: Pupils are equal, round, and reactive to light.   Neck:      Vascular: No carotid bruit.   Cardiovascular:      Rate and Rhythm: Normal rate and regular rhythm.      Heart sounds: Normal heart sounds. No murmur heard.  Pulmonary:      Effort: Pulmonary effort is normal.      Breath sounds: Normal breath sounds. No wheezing, rhonchi or rales.   Abdominal:      General: Abdomen is flat. Bowel sounds are normal. There is no distension.      Palpations: Abdomen is soft.      Tenderness: There is no abdominal tenderness. There is no guarding or rebound.   Musculoskeletal:      Right lower leg: No edema.      Left lower leg: No edema.   Lymphadenopathy:      Cervical: No cervical adenopathy.   Skin:     Findings: No rash.   Neurological:      General: No focal deficit present.      Mental Status: She is alert and oriented to person, place, and time.      Cranial Nerves: No cranial nerve deficit.   Psychiatric:         Mood and Affect: Mood normal.         Behavior: Behavior normal.         Thought Content: Thought content normal.         Judgment: Judgment normal.         Pertinent Laboratory/Diagnostic Studies:  Lab Results   Component Value Date    GLUCOSE 97 10/26/2015    BUN 12 05/13/2024    CREATININE 0.62 05/13/2024    CALCIUM 9.4 05/13/2024     10/26/2015    K 4.2 05/13/2024    CO2 25 05/13/2024     05/13/2024     Lab Results   Component Value Date    ALT 12 05/13/2024    AST 16 05/13/2024    ALKPHOS 101 05/13/2024    BILITOT 0.93 10/26/2015       Lab Results   Component Value  "Date    WBC 9.41 05/13/2024    HGB 14.4 05/13/2024    HCT 43.8 05/13/2024    MCV 91 05/13/2024     05/13/2024       No results found for: \"TSH\"    Lab Results   Component Value Date    CHOL 99 10/26/2015     Lab Results   Component Value Date    TRIG 77 05/13/2024     Lab Results   Component Value Date    HDL 45 (L) 05/13/2024     Lab Results   Component Value Date    LDLCALC 55 05/13/2024     No results found for: \"HGBA1C\"    Results for orders placed or performed in visit on 05/13/24   CBC    Collection Time: 05/13/24  9:19 AM   Result Value Ref Range    WBC 9.41 4.31 - 10.16 Thousand/uL    RBC 4.81 3.81 - 5.12 Million/uL    Hemoglobin 14.4 11.5 - 15.4 g/dL    Hematocrit 43.8 34.8 - 46.1 %    MCV 91 82 - 98 fL    MCH 29.9 26.8 - 34.3 pg    MCHC 32.9 31.4 - 37.4 g/dL    RDW 13.0 11.6 - 15.1 %    Platelets 258 149 - 390 Thousands/uL    MPV 10.6 8.9 - 12.7 fL   Comprehensive metabolic panel    Collection Time: 05/13/24  9:19 AM   Result Value Ref Range    Sodium 138 135 - 147 mmol/L    Potassium 4.2 3.5 - 5.3 mmol/L    Chloride 100 96 - 108 mmol/L    CO2 25 21 - 32 mmol/L    ANION GAP 13 4 - 13 mmol/L    BUN 12 5 - 25 mg/dL    Creatinine 0.62 0.60 - 1.30 mg/dL    Glucose, Fasting 100 (H) 65 - 99 mg/dL    Calcium 9.4 8.4 - 10.2 mg/dL    AST 16 13 - 39 U/L    ALT 12 7 - 52 U/L    Alkaline Phosphatase 101 34 - 104 U/L    Total Protein 7.0 6.4 - 8.4 g/dL    Albumin 4.5 3.5 - 5.0 g/dL    Total Bilirubin 0.72 0.20 - 1.00 mg/dL    eGFR 83 ml/min/1.73sq m   Lipid panel    Collection Time: 05/13/24  9:19 AM   Result Value Ref Range    Cholesterol 115 See Comment mg/dL    Triglycerides 77 See Comment mg/dL    HDL, Direct 45 (L) >=50 mg/dL    LDL Calculated 55 0 - 100 mg/dL    Non-HDL-Chol (CHOL-HDL) 70 mg/dl   TSH, 3rd generation    Collection Time: 05/13/24  9:19 AM   Result Value Ref Range    TSH 3RD GENERATON 2.020 0.450 - 4.500 uIU/mL       No orders of the defined types were placed in this " encounter.      ALLERGIES:  No Known Allergies    Current Medications     Current Outpatient Medications   Medication Sig Dispense Refill    amLODIPine (NORVASC) 10 mg tablet TAKE 1 TABLET EVERY DAY 90 tablet 1    aspirin (ECOTRIN LOW STRENGTH) 81 mg EC tablet Take 81 mg by mouth daily      atorvastatin (LIPITOR) 40 mg tablet TAKE 1 TABLET AT BEDTIME 90 tablet 1    levothyroxine 25 mcg tablet TAKE 1 TABLET EVERY DAY 90 tablet 10    losartan (COZAAR) 100 MG tablet TAKE 1 TABLET EVERY DAY 90 tablet 1    metoprolol tartrate (LOPRESSOR) 50 mg tablet TAKE 1 TABLET TWICE DAILY 180 tablet 1    Multiple Vitamins-Minerals (PRESERVISION AREDS PO) Take 1 tablet by mouth daily        nitroglycerin (NITROSTAT) 0.4 mg SL tablet Place 1 tablet (0.4 mg total) under the tongue every 5 (five) minutes as needed for chest pain 10 tablet 1     No current facility-administered medications for this visit.         Health Maintenance     Health Maintenance   Topic Date Due    SLP PLAN OF CARE  Never done    Osteoporosis Screening  Never done    Zoster Vaccine (1 of 2) Never done    Pneumococcal Vaccine: 65+ Years (1 of 1 - PCV) Never done    RSV Vaccine Age 60+ Years (1 - 1-dose 75+ series) Never done    Influenza Vaccine (1) Never done    COVID-19 Vaccine (1 - 2024-25 season) Never done    Depression Screening  05/13/2025    Fall Risk  05/13/2025    Urinary Incontinence Screening  05/13/2025    Medicare Annual Wellness Visit (AWV)  05/13/2025    RSV Vaccine age 0-20 Months  Aged Out    HIB Vaccine  Aged Out    IPV Vaccine  Aged Out    Hepatitis A Vaccine  Aged Out    Meningococcal ACWY Vaccine  Aged Out    HPV Vaccine  Aged Out       There is no immunization history on file for this patient.    Sanjay Barreto MD

## 2025-01-14 DIAGNOSIS — E03.9 HYPOTHYROIDISM, UNSPECIFIED TYPE: ICD-10-CM

## 2025-01-14 RX ORDER — LEVOTHYROXINE SODIUM 25 UG/1
25 TABLET ORAL DAILY
Qty: 90 TABLET | Refills: 1 | Status: SHIPPED | OUTPATIENT
Start: 2025-01-14

## 2025-02-03 ENCOUNTER — OFFICE VISIT (OUTPATIENT)
Dept: CARDIOLOGY CLINIC | Facility: CLINIC | Age: 85
End: 2025-02-03
Payer: MEDICARE

## 2025-02-03 VITALS
OXYGEN SATURATION: 98 % | SYSTOLIC BLOOD PRESSURE: 140 MMHG | WEIGHT: 107 LBS | HEART RATE: 64 BPM | DIASTOLIC BLOOD PRESSURE: 68 MMHG | BODY MASS INDEX: 21.61 KG/M2

## 2025-02-03 DIAGNOSIS — I25.10 ARTERIOSCLEROSIS OF CORONARY ARTERY: Primary | ICD-10-CM

## 2025-02-03 DIAGNOSIS — I10 ESSENTIAL HYPERTENSION: ICD-10-CM

## 2025-02-03 DIAGNOSIS — E78.2 MIXED HYPERLIPIDEMIA: ICD-10-CM

## 2025-02-03 DIAGNOSIS — I48.0 PAROXYSMAL ATRIAL FIBRILLATION (HCC): ICD-10-CM

## 2025-02-03 PROCEDURE — 99214 OFFICE O/P EST MOD 30 MIN: CPT | Performed by: INTERNAL MEDICINE

## 2025-02-03 NOTE — ASSESSMENT & PLAN NOTE
BP much improved today, and remains at goal for age.    Cough improved after lisinopril was stopped.   Continue norvasc at 10mg daily along with losartan and metoprolol.    HCTZ was stopped since prior visit, appears to be tolerating well from a blood pressure perspective.  Continue current regiment

## 2025-02-03 NOTE — ASSESSMENT & PLAN NOTE
remote history, no recurrence.    Continued on B-blocker, no AC necessary unless develops recurrence.    Continues without symptoms to suggest recurrence.

## 2025-02-03 NOTE — ASSESSMENT & PLAN NOTE
LDL at goal, 39 in Oct 2018.    Continue statin.    LDL 49 in Oct 2019, 48 in May 2020, 38 in Nov 2020 - at goal.    Recheck in Nov 2021 was 44 and 54 in May 2022.    Repeat lipids in May 2023 revealed LDL of 59.    LDL remains well-controlled at 55 in May 2024.  Repeat levels later this year

## 2025-02-03 NOTE — ASSESSMENT & PLAN NOTE
s/p Eddie godoy Jan 2013, feels ok, compliant with medications.   Heart healthy diet with increased fresh fruit and vegetables, lean proteins (chicken, fish, beans), whole grains (brown rice, whole wheat bread, whole wheat pasta), and reduce sugary desserts.   Off Effient since it has been over 1 year of therapy since her PATRICK.   Continue with baby ASA daily.    Remains asymptomatic and doing well.  No additional testing required today

## 2025-02-03 NOTE — PROGRESS NOTES
Cardiology Follow Up    Jose De Jesus Boateng  1940  [id number]  [facility]  [address]  [place] [address] [phone]      Assessment & Plan  Arteriosclerosis of coronary artery  s/p Xience stent Jan 2013, feels ok, compliant with medications.   Heart healthy diet with increased fresh fruit and vegetables, lean proteins (chicken, fish, beans), whole grains (brown rice, whole wheat bread, whole wheat pasta), and reduce sugary desserts.   Off Effient since it has been over 1 year of therapy since her PATRICK.   Continue with baby ASA daily.    Remains asymptomatic and doing well.  No additional testing required today  Essential hypertension  BP much improved today, and remains at goal for age.    Cough improved after lisinopril was stopped.   Continue norvasc at 10mg daily along with losartan and metoprolol.    HCTZ was stopped since prior visit, appears to be tolerating well from a blood pressure perspective.  Continue current regiment  Mixed hyperlipidemia  LDL at goal, 39 in Oct 2018.    Continue statin.    LDL 49 in Oct 2019, 48 in May 2020, 38 in Nov 2020 - at goal.    Recheck in Nov 2021 was 44 and 54 in May 2022.    Repeat lipids in May 2023 revealed LDL of 59.    LDL remains well-controlled at 55 in May 2024.  Repeat levels later this year  Paroxysmal atrial fibrillation (HCC)  remote history, no recurrence.    Continued on B-blocker, no AC necessary unless develops recurrence.    Continues without symptoms to suggest recurrence.    Chief Complaint   Patient presents with    Follow-up     No cardiac concerns or complaints        Interval History: Patient feels well, without complaints.  No reported chest pain, shortness of breath, palpitations, lightheadedness, syncope, LE edema, orthopnea, PND, or significant weight changes.  Patient remains active without any increased fatigue out of the ordinary.        Blood pressure 140/68, pulse 64, weight 48.5 kg (107  lb), SpO2 98%.      Review of Systems:  Review of Systems   Constitutional:  Negative for activity change, appetite change, chills, diaphoresis, fatigue and unexpected weight change.   HENT:  Negative for hearing loss, nosebleeds and sore throat.    Eyes:  Negative for photophobia and visual disturbance.   Respiratory:  Negative for cough, chest tightness, shortness of breath and wheezing.    Cardiovascular:  Negative for chest pain, palpitations and leg swelling.   Gastrointestinal:  Negative for abdominal pain, diarrhea, nausea and vomiting.   Endocrine: Negative for polyuria.   Genitourinary:  Negative for dysuria, frequency and hematuria.   Musculoskeletal:  Negative for arthralgias, back pain, gait problem and neck pain.   Skin:  Negative for pallor and rash.   Neurological:  Negative for dizziness, syncope and headaches.   Hematological:  Does not bruise/bleed easily.   Psychiatric/Behavioral:  Negative for behavioral problems and confusion.        Physical Exam:  Physical Exam  Vitals reviewed.   Constitutional:       Appearance: Normal appearance. She is well-developed. She is not ill-appearing or diaphoretic.   HENT:      Head: Normocephalic and atraumatic.      Nose: Nose normal.   Eyes:      General: No scleral icterus.     Extraocular Movements: Extraocular movements intact.      Pupils: Pupils are equal, round, and reactive to light.   Neck:      Vascular: No JVD.   Cardiovascular:      Rate and Rhythm: Normal rate and regular rhythm.      Heart sounds: Normal heart sounds. No murmur heard.     No friction rub. No gallop.   Pulmonary:      Effort: Pulmonary effort is normal. No respiratory distress.      Breath sounds: Normal breath sounds. No wheezing or rales.   Abdominal:      General: Bowel sounds are normal. There is no distension.      Palpations: Abdomen is soft.      Tenderness: There is no abdominal tenderness.   Musculoskeletal:         General: No deformity. Normal range of motion.       Cervical back: Normal range of motion and neck supple.      Right lower leg: No edema.      Left lower leg: No edema.   Skin:     General: Skin is warm and dry.      Findings: No rash.   Neurological:      Mental Status: She is alert and oriented to person, place, and time.      Cranial Nerves: No cranial nerve deficit.   Psychiatric:         Mood and Affect: Mood normal.         Behavior: Behavior normal.         Patient Active Problem List   Diagnosis    Arteriosclerosis of coronary artery    Hyperlipidemia    Esophageal reflux    Generalized anxiety disorder    Hypothyroidism    Left lower quadrant pain    Paroxysmal atrial fibrillation (HCC)    Chronic left-sided low back pain without sciatica    Essential hypertension     Past Medical History:   Diagnosis Date    Coronary artery disease      Social History     Socioeconomic History    Marital status: Single     Spouse name: Not on file    Number of children: Not on file    Years of education: Not on file    Highest education level: Not on file   Occupational History    Not on file   Tobacco Use    Smoking status: Never    Smokeless tobacco: Never   Vaping Use    Vaping status: Never Used   Substance and Sexual Activity    Alcohol use: Not Currently    Drug use: No    Sexual activity: Not Currently   Other Topics Concern    Not on file   Social History Narrative    Daily coffee consumption (4 cups/day)     Social Drivers of Health     Financial Resource Strain: Low Risk  (5/10/2023)    Overall Financial Resource Strain (CARDIA)     Difficulty of Paying Living Expenses: Not very hard   Food Insecurity: No Food Insecurity (5/13/2024)    Nursing - Inadequate Food Risk Classification     Worried About Running Out of Food in the Last Year: Never true     Ran Out of Food in the Last Year: Never true     Ran Out of Food in the Last Year: Not on file   Transportation Needs: No Transportation Needs (5/13/2024)    PRAPARE - Transportation     Lack of Transportation  (Medical): No     Lack of Transportation (Non-Medical): No   Physical Activity: Not on file   Stress: Not on file   Social Connections: Not on file   Intimate Partner Violence: Not on file   Housing Stability: Low Risk  (2024)    Housing Stability Vital Sign     Unable to Pay for Housing in the Last Year: No     Number of Times Moved in the Last Year: 1     Homeless in the Last Year: No      Family History   Problem Relation Age of Onset    Heart attack Mother         prior MI,  at the age of 90    Coronary artery disease Father     Hypertension Father     Cancer Sister     Coronary artery disease Sister     Diabetes Sister     Hyperlipidemia Sister     Diabetes Brother      Past Surgical History:   Procedure Laterality Date    CARDIAC CATHETERIZATION      post cath with PCI to the proximal LAD with a xience stent 2013    CORONARY ANGIOPLASTY WITH STENT PLACEMENT         Current Outpatient Medications:     amLODIPine (NORVASC) 10 mg tablet, TAKE 1 TABLET EVERY DAY, Disp: 90 tablet, Rfl: 1    aspirin (ECOTRIN LOW STRENGTH) 81 mg EC tablet, Take 81 mg by mouth daily, Disp: , Rfl:     atorvastatin (LIPITOR) 40 mg tablet, TAKE 1 TABLET AT BEDTIME, Disp: 90 tablet, Rfl: 1    levothyroxine 25 mcg tablet, TAKE 1 TABLET EVERY DAY, Disp: 90 tablet, Rfl: 1    losartan (COZAAR) 100 MG tablet, TAKE 1 TABLET EVERY DAY, Disp: 90 tablet, Rfl: 1    metoprolol tartrate (LOPRESSOR) 50 mg tablet, TAKE 1 TABLET TWICE DAILY, Disp: 180 tablet, Rfl: 1    Multiple Vitamins-Minerals (PRESERVISION AREDS PO), Take 1 tablet by mouth daily  , Disp: , Rfl:     nitroglycerin (NITROSTAT) 0.4 mg SL tablet, Place 1 tablet (0.4 mg total) under the tongue every 5 (five) minutes as needed for chest pain, Disp: 10 tablet, Rfl: 1  No Known Allergies    Labs:  No visits with results within 6 Month(s) from this visit.   Latest known visit with results is:   Appointment on 2024   Component Date Value    WBC 2024 9.41     RBC  05/13/2024 4.81     Hemoglobin 05/13/2024 14.4     Hematocrit 05/13/2024 43.8     MCV 05/13/2024 91     MCH 05/13/2024 29.9     MCHC 05/13/2024 32.9     RDW 05/13/2024 13.0     Platelets 05/13/2024 258     MPV 05/13/2024 10.6     Sodium 05/13/2024 138     Potassium 05/13/2024 4.2     Chloride 05/13/2024 100     CO2 05/13/2024 25     ANION GAP 05/13/2024 13     BUN 05/13/2024 12     Creatinine 05/13/2024 0.62     Glucose, Fasting 05/13/2024 100 (H)     Calcium 05/13/2024 9.4     AST 05/13/2024 16     ALT 05/13/2024 12     Alkaline Phosphatase 05/13/2024 101     Total Protein 05/13/2024 7.0     Albumin 05/13/2024 4.5     Total Bilirubin 05/13/2024 0.72     eGFR 05/13/2024 83     Cholesterol 05/13/2024 115     Triglycerides 05/13/2024 77     HDL, Direct 05/13/2024 45 (L)     LDL Calculated 05/13/2024 55     Non-HDL-Chol (CHOL-HDL) 05/13/2024 70     TSH 3RD GENERATON 05/13/2024 2.020      Lab Results   Component Value Date    CHOL 99 10/26/2015    TRIG 77 05/13/2024    TRIG 64 10/26/2015    HDL 45 (L) 05/13/2024    HDL 40 10/26/2015     Imaging: No results found.

## 2025-04-28 DIAGNOSIS — I10 ESSENTIAL HYPERTENSION: ICD-10-CM

## 2025-04-28 DIAGNOSIS — E78.2 MIXED HYPERLIPIDEMIA: ICD-10-CM

## 2025-04-28 DIAGNOSIS — I48.0 PAROXYSMAL ATRIAL FIBRILLATION (HCC): ICD-10-CM

## 2025-04-29 RX ORDER — LOSARTAN POTASSIUM 100 MG/1
100 TABLET ORAL DAILY
Qty: 90 TABLET | Refills: 1 | Status: SHIPPED | OUTPATIENT
Start: 2025-04-29

## 2025-04-30 RX ORDER — AMLODIPINE BESYLATE 10 MG/1
10 TABLET ORAL DAILY
Qty: 90 TABLET | Refills: 1 | Status: SHIPPED | OUTPATIENT
Start: 2025-04-30

## 2025-04-30 RX ORDER — METOPROLOL TARTRATE 50 MG
50 TABLET ORAL 2 TIMES DAILY
Qty: 180 TABLET | Refills: 1 | Status: SHIPPED | OUTPATIENT
Start: 2025-04-30

## 2025-04-30 RX ORDER — ATORVASTATIN CALCIUM 40 MG/1
40 TABLET, FILM COATED ORAL
Qty: 90 TABLET | Refills: 1 | Status: SHIPPED | OUTPATIENT
Start: 2025-04-30

## 2025-05-14 ENCOUNTER — APPOINTMENT (OUTPATIENT)
Dept: LAB | Facility: CLINIC | Age: 85
End: 2025-05-14
Attending: FAMILY MEDICINE
Payer: MEDICARE

## 2025-05-14 ENCOUNTER — OFFICE VISIT (OUTPATIENT)
Dept: FAMILY MEDICINE CLINIC | Facility: CLINIC | Age: 85
End: 2025-05-14
Payer: MEDICARE

## 2025-05-14 VITALS
HEIGHT: 59 IN | RESPIRATION RATE: 18 BRPM | OXYGEN SATURATION: 97 % | DIASTOLIC BLOOD PRESSURE: 80 MMHG | WEIGHT: 110.25 LBS | SYSTOLIC BLOOD PRESSURE: 160 MMHG | HEART RATE: 71 BPM | TEMPERATURE: 97 F | BODY MASS INDEX: 22.23 KG/M2

## 2025-05-14 DIAGNOSIS — I10 ESSENTIAL HYPERTENSION: ICD-10-CM

## 2025-05-14 DIAGNOSIS — I25.10 ARTERIOSCLEROSIS OF CORONARY ARTERY: ICD-10-CM

## 2025-05-14 DIAGNOSIS — E78.49 OTHER HYPERLIPIDEMIA: ICD-10-CM

## 2025-05-14 DIAGNOSIS — E03.9 ACQUIRED HYPOTHYROIDISM: ICD-10-CM

## 2025-05-14 DIAGNOSIS — E78.49 OTHER HYPERLIPIDEMIA: Primary | ICD-10-CM

## 2025-05-14 LAB
ALBUMIN SERPL BCG-MCNC: 4.4 G/DL (ref 3.5–5)
ALP SERPL-CCNC: 95 U/L (ref 34–104)
ALT SERPL W P-5'-P-CCNC: 10 U/L (ref 7–52)
ANION GAP SERPL CALCULATED.3IONS-SCNC: 7 MMOL/L (ref 4–13)
AST SERPL W P-5'-P-CCNC: 12 U/L (ref 13–39)
BILIRUB SERPL-MCNC: 0.63 MG/DL (ref 0.2–1)
BUN SERPL-MCNC: 15 MG/DL (ref 5–25)
CALCIUM SERPL-MCNC: 9.4 MG/DL (ref 8.4–10.2)
CHLORIDE SERPL-SCNC: 102 MMOL/L (ref 96–108)
CHOLEST SERPL-MCNC: 112 MG/DL (ref ?–200)
CO2 SERPL-SCNC: 28 MMOL/L (ref 21–32)
CREAT SERPL-MCNC: 0.6 MG/DL (ref 0.6–1.3)
ERYTHROCYTE [DISTWIDTH] IN BLOOD BY AUTOMATED COUNT: 13 % (ref 11.6–15.1)
GFR SERPL CREATININE-BSD FRML MDRD: 83 ML/MIN/1.73SQ M
GLUCOSE P FAST SERPL-MCNC: 96 MG/DL (ref 65–99)
HCT VFR BLD AUTO: 44.3 % (ref 34.8–46.1)
HDLC SERPL-MCNC: 46 MG/DL
HGB BLD-MCNC: 14.6 G/DL (ref 11.5–15.4)
LDLC SERPL CALC-MCNC: 56 MG/DL (ref 0–100)
MCH RBC QN AUTO: 30 PG (ref 26.8–34.3)
MCHC RBC AUTO-ENTMCNC: 33 G/DL (ref 31.4–37.4)
MCV RBC AUTO: 91 FL (ref 82–98)
NONHDLC SERPL-MCNC: 66 MG/DL
PLATELET # BLD AUTO: 243 THOUSANDS/UL (ref 149–390)
PMV BLD AUTO: 10.6 FL (ref 8.9–12.7)
POTASSIUM SERPL-SCNC: 3.9 MMOL/L (ref 3.5–5.3)
PROT SERPL-MCNC: 7.2 G/DL (ref 6.4–8.4)
RBC # BLD AUTO: 4.86 MILLION/UL (ref 3.81–5.12)
SODIUM SERPL-SCNC: 137 MMOL/L (ref 135–147)
TRIGL SERPL-MCNC: 50 MG/DL (ref ?–150)
TSH SERPL DL<=0.05 MIU/L-ACNC: 1.85 UIU/ML (ref 0.45–4.5)
WBC # BLD AUTO: 6.94 THOUSAND/UL (ref 4.31–10.16)

## 2025-05-14 PROCEDURE — 84443 ASSAY THYROID STIM HORMONE: CPT

## 2025-05-14 PROCEDURE — 36415 COLL VENOUS BLD VENIPUNCTURE: CPT

## 2025-05-14 PROCEDURE — G0439 PPPS, SUBSEQ VISIT: HCPCS | Performed by: FAMILY MEDICINE

## 2025-05-14 PROCEDURE — 80053 COMPREHEN METABOLIC PANEL: CPT

## 2025-05-14 PROCEDURE — 99213 OFFICE O/P EST LOW 20 MIN: CPT | Performed by: FAMILY MEDICINE

## 2025-05-14 PROCEDURE — 80061 LIPID PANEL: CPT

## 2025-05-14 PROCEDURE — 85027 COMPLETE CBC AUTOMATED: CPT

## 2025-05-14 PROCEDURE — G2211 COMPLEX E/M VISIT ADD ON: HCPCS | Performed by: FAMILY MEDICINE

## 2025-05-14 NOTE — PROGRESS NOTES
Name: Jose De Jesus Boateng      : 1940      MRN: [id number]  Encounter Provider: Sanjay Barreto MD  Encounter Date: 2025   Encounter department: [facility]  :  Assessment & Plan  Other hyperlipidemia  Hyperlipidemia.  Patient will check lipid panel blood work and continue with current dose of statin therapy    Orders:    CBC; Future    Comprehensive metabolic panel; Future    Lipid panel; Future    TSH, 3rd generation; Future    Arteriosclerosis of coronary artery  Coronary artery disease.  Patient continues to see cardiologist twice yearly but denies any chest pain or shortness of breath at this time.    Orders:    CBC; Future    Comprehensive metabolic panel; Future    Lipid panel; Future    TSH, 3rd generation; Future    Acquired hypothyroidism  Hypothyroidism.  Patient will check TSH blood work and continue with current dose of levothyroxine    Orders:    CBC; Future    Comprehensive metabolic panel; Future    Lipid panel; Future    TSH, 3rd generation; Future    Essential hypertension  Hypertension.  The patient's blood pressure is stable at this time and he will continue current regimen of medications            Preventive health issues were discussed with patient, and age appropriate screening tests were ordered as noted in patient's After Visit Summary. Personalized health advice and appropriate referrals for health education or preventive services given if needed, as noted in patient's After Visit Summary.    History of Present Illness     Patient in the office to review chronic medical condition.  She denies any recent illness.  She does see her cardiologist twice yearly but denies any symptoms of chest pain or shortness of breath.       Patient Care Team:  Sanjay Barreto MD as PCP - General  MD Sanjay Womack MD    Review of Systems   Constitutional: Negative.    HENT: Negative.     Eyes: Negative.    Respiratory: Negative.     Cardiovascular: Negative.     Gastrointestinal: Negative.    Genitourinary: Negative.    Musculoskeletal: Negative.    Skin: Negative.    Neurological: Negative.    Psychiatric/Behavioral: Negative.       Medical History Reviewed by provider this encounter:  [facility]       Annual Wellness Visit Questionnaire   Jose De Jesus is here for her Subsequent Wellness visit. Last Medicare Wellness visit information reviewed, patient interviewed and updates made to the record today.      Health Risk Assessment:   Patient rates overall health as very good. Patient feels that their physical health rating is same. Patient is satisfied with their life. Eyesight was rated as same. Hearing was rated as same. Patient feels that their emotional and mental health rating is same. Patients states they are never, rarely angry. Patient states they are sometimes unusually tired/fatigued. Pain experienced in the last 7 days has been none. Patient states that she has experienced no weight loss or gain in last 6 months.     Depression Screening:   PHQ-2 Score: 0      Fall Risk Screening:   In the past year, patient has experienced: no history of falling in past year      Urinary Incontinence Screening:   Patient has not leaked urine accidently in the last six months.     Home Safety:  Patient has trouble with stairs inside or outside of their home. Patient has working smoke alarms and has working carbon monoxide detector. Home safety hazards include: none.     Nutrition:   Current diet is Regular.     Medications:   Patient is currently taking over-the-counter supplements. OTC medications include: see medication list. Patient is able to manage medications.     Activities of Daily Living (ADLs)/Instrumental Activities of Daily Living (IADLs):   Walk and transfer into and out of bed and chair?: Yes  Dress and groom yourself?: Yes    Bathe or shower yourself?: Yes    Feed yourself? Yes  Do your laundry/housekeeping?: Yes  Manage your money, pay your bills and track your expenses?:  Yes  Make your own meals?: Yes    Do your own shopping?: Yes    Previous Hospitalizations:   Any hospitalizations or ED visits within the last 12 months?: No      Advance Care Planning:   Living will: Yes    Durable POA for healthcare: Yes    Advanced directive: Yes      Cognitive Screening:   Provider or family/friend/caregiver concerned regarding cognition?: No    Preventive Screenings      Cardiovascular Screening:    General: Screening Not Indicated and History Lipid Disorder    Due for: Lipid Panel      Diabetes Screening:     General: Risks and Benefits Discussed    Due for: Blood Glucose      Colorectal Cancer Screening:     General: Screening Not Indicated      Breast Cancer Screening:     General: Screening Not Indicated      Cervical Cancer Screening:    General: Screening Not Indicated      Lung Cancer Screening:     General: Screening Not Indicated    Immunizations:  - Immunizations due: Prevnar 20 and Zoster (Shingrix)    Screening, Brief Intervention, and Referral to Treatment (SBIRT)     Screening  Typical number of drinks in a day: 0  Typical number of drinks in a week: 0  Interpretation: Low risk drinking behavior.    Single Item Drug Screening:  How often have you used an illegal drug (including marijuana) or a prescription medication for non-medical reasons in the past year? never    Single Item Drug Screen Score: 0  Interpretation: Negative screen for possible drug use disorder    Social Drivers of Health     Financial Resource Strain: Low Risk  (5/10/2023)    Overall Financial Resource Strain (CARDIA)     Difficulty of Paying Living Expenses: Not very hard   Food Insecurity: No Food Insecurity (5/13/2024)    Nursing - Inadequate Food Risk Classification     Worried About Running Out of Food in the Last Year: Never true     Ran Out of Food in the Last Year: Never true   Transportation Needs: No Transportation Needs (5/13/2024)    PRAPARE - Transportation     Lack of Transportation (Medical): No  "    Lack of Transportation (Non-Medical): No   Housing Stability: Low Risk  (5/13/2024)    Housing Stability Vital Sign     Unable to Pay for Housing in the Last Year: No     Number of Times Moved in the Last Year: 1     Homeless in the Last Year: No   Utilities: Not At Risk (5/13/2024)    [facility]     Threatened with loss of utilities: No     No results found.    Objective   /80 (Patient Position: Sitting, Cuff Size: Adult)   Pulse 71   Temp (!) 97 °F (36.1 °C) (Temporal)   Resp 18   Ht 4' 11\" (1.499 m)   Wt 50 kg (110 lb 4 oz)   LMP  (LMP Unknown)   SpO2 97%   BMI 22.27 kg/m²     Physical Exam  Vitals and nursing note reviewed.   Constitutional:       General: She is not in acute distress.     Appearance: Normal appearance. She is well-developed. She is not ill-appearing.   HENT:      Head: Normocephalic and atraumatic.     Eyes:      General:         Right eye: No discharge.         Left eye: No discharge.      Extraocular Movements: Extraocular movements intact.      Conjunctiva/sclera: Conjunctivae normal.      Pupils: Pupils are equal, round, and reactive to light.     Neck:      Vascular: No carotid bruit.     Cardiovascular:      Rate and Rhythm: Normal rate and regular rhythm.      Heart sounds: No murmur heard.  Pulmonary:      Effort: Pulmonary effort is normal. No respiratory distress.      Breath sounds: Normal breath sounds.   Abdominal:      General: Abdomen is flat. Bowel sounds are normal.      Palpations: Abdomen is soft.      Tenderness: There is no abdominal tenderness. There is no guarding or rebound.     Musculoskeletal:      Right lower leg: No edema.      Left lower leg: No edema.   Lymphadenopathy:      Cervical: No cervical adenopathy.     Skin:     General: Skin is warm and dry.      Capillary Refill: Capillary refill takes less than 2 seconds.     Neurological:      Mental Status: She is alert. Mental status is at baseline.     Psychiatric:         Mood and Affect: Mood " normal.         Behavior: Behavior normal.         Thought Content: Thought content normal.         Judgment: Judgment normal.     I spent 30 minutes with this patient of which greater than 50% was spent counseling or reviewing chart

## 2025-05-14 NOTE — ASSESSMENT & PLAN NOTE
Hypothyroidism.  Patient will check TSH blood work and continue with current dose of levothyroxine    Orders:    CBC; Future    Comprehensive metabolic panel; Future    Lipid panel; Future    TSH, 3rd generation; Future

## 2025-05-14 NOTE — ASSESSMENT & PLAN NOTE
Coronary artery disease.  Patient continues to see cardiologist twice yearly but denies any chest pain or shortness of breath at this time.    Orders:    CBC; Future    Comprehensive metabolic panel; Future    Lipid panel; Future    TSH, 3rd generation; Future

## 2025-05-15 ENCOUNTER — RESULTS FOLLOW-UP (OUTPATIENT)
Dept: FAMILY MEDICINE CLINIC | Facility: CLINIC | Age: 85
End: 2025-05-15